# Patient Record
Sex: FEMALE | Race: WHITE | NOT HISPANIC OR LATINO | Employment: OTHER | URBAN - METROPOLITAN AREA
[De-identification: names, ages, dates, MRNs, and addresses within clinical notes are randomized per-mention and may not be internally consistent; named-entity substitution may affect disease eponyms.]

---

## 2017-02-01 RX ORDER — LEVOTHYROXINE SODIUM 0.05 MG/1
TABLET ORAL
Qty: 30 TAB | Refills: 0 | Status: SHIPPED | OUTPATIENT
Start: 2017-02-01 | End: 2017-06-16 | Stop reason: SDUPTHER

## 2017-05-02 ENCOUNTER — PATIENT OUTREACH (OUTPATIENT)
Dept: HEALTH INFORMATION MANAGEMENT | Facility: OTHER | Age: 62
End: 2017-05-02

## 2017-05-08 ENCOUNTER — HOSPITAL ENCOUNTER (OUTPATIENT)
Dept: LAB | Facility: MEDICAL CENTER | Age: 62
End: 2017-05-08
Attending: INTERNAL MEDICINE
Payer: MEDICARE

## 2017-05-08 ENCOUNTER — HOSPITAL ENCOUNTER (OUTPATIENT)
Dept: LAB | Facility: MEDICAL CENTER | Age: 62
End: 2017-05-08
Attending: NURSE PRACTITIONER
Payer: MEDICARE

## 2017-05-08 DIAGNOSIS — J43.9 MIXED RESTRICTIVE AND OBSTRUCTIVE LUNG DISEASE (HCC): ICD-10-CM

## 2017-05-08 DIAGNOSIS — E03.4 HYPOTHYROIDISM DUE TO ACQUIRED ATROPHY OF THYROID: ICD-10-CM

## 2017-05-08 DIAGNOSIS — Z13.220 SCREENING FOR LIPID DISORDERS: ICD-10-CM

## 2017-05-08 DIAGNOSIS — J98.4 MIXED RESTRICTIVE AND OBSTRUCTIVE LUNG DISEASE (HCC): ICD-10-CM

## 2017-05-08 LAB
ALBUMIN SERPL BCP-MCNC: 4.2 G/DL (ref 3.2–4.9)
ALBUMIN SERPL BCP-MCNC: 4.4 G/DL (ref 3.2–4.9)
ALBUMIN/GLOB SERPL: 1.4 G/DL
ALBUMIN/GLOB SERPL: 1.5 G/DL
ALP SERPL-CCNC: 58 U/L (ref 30–99)
ALP SERPL-CCNC: 59 U/L (ref 30–99)
ALT SERPL-CCNC: 13 U/L (ref 2–50)
ALT SERPL-CCNC: 13 U/L (ref 2–50)
ANION GAP SERPL CALC-SCNC: 6 MMOL/L (ref 0–11.9)
ANION GAP SERPL CALC-SCNC: 8 MMOL/L (ref 0–11.9)
AST SERPL-CCNC: 16 U/L (ref 12–45)
AST SERPL-CCNC: 18 U/L (ref 12–45)
BASOPHILS # BLD AUTO: 0.7 % (ref 0–1.8)
BASOPHILS # BLD AUTO: 0.9 % (ref 0–1.8)
BASOPHILS # BLD: 0.05 K/UL (ref 0–0.12)
BASOPHILS # BLD: 0.06 K/UL (ref 0–0.12)
BILIRUB SERPL-MCNC: 0.8 MG/DL (ref 0.1–1.5)
BILIRUB SERPL-MCNC: 0.8 MG/DL (ref 0.1–1.5)
BUN SERPL-MCNC: 11 MG/DL (ref 8–22)
BUN SERPL-MCNC: 11 MG/DL (ref 8–22)
CALCIUM SERPL-MCNC: 10 MG/DL (ref 8.5–10.5)
CALCIUM SERPL-MCNC: 9.6 MG/DL (ref 8.5–10.5)
CHLORIDE SERPL-SCNC: 102 MMOL/L (ref 96–112)
CHLORIDE SERPL-SCNC: 103 MMOL/L (ref 96–112)
CHOLEST SERPL-MCNC: 176 MG/DL (ref 100–199)
CHOLEST SERPL-MCNC: 183 MG/DL (ref 100–199)
CO2 SERPL-SCNC: 26 MMOL/L (ref 20–33)
CO2 SERPL-SCNC: 28 MMOL/L (ref 20–33)
COMMENT 1642: NORMAL
CREAT SERPL-MCNC: 0.88 MG/DL (ref 0.5–1.4)
CREAT SERPL-MCNC: 0.91 MG/DL (ref 0.5–1.4)
EOSINOPHIL # BLD AUTO: 0.11 K/UL (ref 0–0.51)
EOSINOPHIL # BLD AUTO: 0.14 K/UL (ref 0–0.51)
EOSINOPHIL NFR BLD: 1.6 % (ref 0–6.9)
EOSINOPHIL NFR BLD: 2 % (ref 0–6.9)
ERYTHROCYTE [DISTWIDTH] IN BLOOD BY AUTOMATED COUNT: 45.7 FL (ref 35.9–50)
ERYTHROCYTE [DISTWIDTH] IN BLOOD BY AUTOMATED COUNT: 45.8 FL (ref 35.9–50)
GFR SERPL CREATININE-BSD FRML MDRD: >60 ML/MIN/1.73 M 2
GFR SERPL CREATININE-BSD FRML MDRD: >60 ML/MIN/1.73 M 2
GLOBULIN SER CALC-MCNC: 3 G/DL (ref 1.9–3.5)
GLOBULIN SER CALC-MCNC: 3 G/DL (ref 1.9–3.5)
GLUCOSE SERPL-MCNC: 104 MG/DL (ref 65–99)
GLUCOSE SERPL-MCNC: 112 MG/DL (ref 65–99)
HCT VFR BLD AUTO: 47.5 % (ref 37–47)
HCT VFR BLD AUTO: 47.8 % (ref 37–47)
HDLC SERPL-MCNC: 50 MG/DL
HDLC SERPL-MCNC: 50 MG/DL
HGB BLD-MCNC: 16.1 G/DL (ref 12–16)
HGB BLD-MCNC: 16.1 G/DL (ref 12–16)
IMM GRANULOCYTES # BLD AUTO: 0.01 K/UL (ref 0–0.11)
IMM GRANULOCYTES # BLD AUTO: 0.01 K/UL (ref 0–0.11)
IMM GRANULOCYTES NFR BLD AUTO: 0.1 % (ref 0–0.9)
IMM GRANULOCYTES NFR BLD AUTO: 0.1 % (ref 0–0.9)
LDLC SERPL CALC-MCNC: 104 MG/DL
LDLC SERPL CALC-MCNC: 98 MG/DL
LYMPHOCYTES # BLD AUTO: 2.15 K/UL (ref 1–4.8)
LYMPHOCYTES # BLD AUTO: 2.2 K/UL (ref 1–4.8)
LYMPHOCYTES NFR BLD: 32.1 % (ref 22–41)
LYMPHOCYTES NFR BLD: 32.2 % (ref 22–41)
MCH RBC QN AUTO: 30.3 PG (ref 27–33)
MCH RBC QN AUTO: 30.6 PG (ref 27–33)
MCHC RBC AUTO-ENTMCNC: 33.7 G/DL (ref 33.6–35)
MCHC RBC AUTO-ENTMCNC: 33.9 G/DL (ref 33.6–35)
MCV RBC AUTO: 89.8 FL (ref 81.4–97.8)
MCV RBC AUTO: 90.3 FL (ref 81.4–97.8)
MONOCYTES # BLD AUTO: 0.39 K/UL (ref 0–0.85)
MONOCYTES # BLD AUTO: 0.43 K/UL (ref 0–0.85)
MONOCYTES NFR BLD AUTO: 5.8 % (ref 0–13.4)
MONOCYTES NFR BLD AUTO: 6.3 % (ref 0–13.4)
MORPHOLOGY BLD-IMP: NORMAL
NEUTROPHILS # BLD AUTO: 3.99 K/UL (ref 2–7.15)
NEUTROPHILS # BLD AUTO: 4 K/UL (ref 2–7.15)
NEUTROPHILS NFR BLD: 58.5 % (ref 44–72)
NEUTROPHILS NFR BLD: 59.7 % (ref 44–72)
NRBC # BLD AUTO: 0 K/UL
NRBC # BLD AUTO: 0 K/UL
NRBC BLD AUTO-RTO: 0 /100 WBC
NRBC BLD AUTO-RTO: 0 /100 WBC
PLATELET # BLD AUTO: 239 K/UL (ref 164–446)
PLATELET # BLD AUTO: 253 K/UL (ref 164–446)
PMV BLD AUTO: 9.2 FL (ref 9–12.9)
PMV BLD AUTO: 9.4 FL (ref 9–12.9)
POTASSIUM SERPL-SCNC: 4.5 MMOL/L (ref 3.6–5.5)
POTASSIUM SERPL-SCNC: 4.6 MMOL/L (ref 3.6–5.5)
PROT SERPL-MCNC: 7.2 G/DL (ref 6–8.2)
PROT SERPL-MCNC: 7.4 G/DL (ref 6–8.2)
RBC # BLD AUTO: 5.26 M/UL (ref 4.2–5.4)
RBC # BLD AUTO: 5.32 M/UL (ref 4.2–5.4)
SODIUM SERPL-SCNC: 136 MMOL/L (ref 135–145)
SODIUM SERPL-SCNC: 137 MMOL/L (ref 135–145)
T4 FREE SERPL-MCNC: 0.69 NG/DL (ref 0.53–1.43)
TRIGL SERPL-MCNC: 140 MG/DL (ref 0–149)
TRIGL SERPL-MCNC: 143 MG/DL (ref 0–149)
TSH SERPL DL<=0.005 MIU/L-ACNC: 9.66 UIU/ML (ref 0.3–3.7)
TSH SERPL DL<=0.005 MIU/L-ACNC: 9.76 UIU/ML (ref 0.3–3.7)
WBC # BLD AUTO: 6.7 K/UL (ref 4.8–10.8)
WBC # BLD AUTO: 6.8 K/UL (ref 4.8–10.8)

## 2017-05-08 PROCEDURE — 80053 COMPREHEN METABOLIC PANEL: CPT | Mod: 91

## 2017-05-08 PROCEDURE — 85025 COMPLETE CBC W/AUTO DIFF WBC: CPT | Mod: 91

## 2017-05-08 PROCEDURE — 84439 ASSAY OF FREE THYROXINE: CPT

## 2017-05-08 PROCEDURE — 80061 LIPID PANEL: CPT | Mod: GA,91

## 2017-05-08 PROCEDURE — 84443 ASSAY THYROID STIM HORMONE: CPT | Mod: 91

## 2017-05-16 NOTE — PROGRESS NOTES
Outcome: Left Message    WebIZ Checked & Epic Updated:  yes    HealthConnect Verified: no    Attempt # 2

## 2017-05-19 ENCOUNTER — APPOINTMENT (OUTPATIENT)
Dept: MEDICAL GROUP | Age: 62
End: 2017-05-19
Payer: MEDICARE

## 2017-05-23 ENCOUNTER — TELEPHONE (OUTPATIENT)
Dept: HEALTH INFORMATION MANAGEMENT | Facility: OTHER | Age: 62
End: 2017-05-23

## 2017-05-23 DIAGNOSIS — Z12.11 SCREENING FOR COLON CANCER: ICD-10-CM

## 2017-05-23 NOTE — TELEPHONE ENCOUNTER
Please advise pt she is due for FIT. Please mail pt packet or she may .   Please close encounter once complete.

## 2017-05-23 NOTE — PROGRESS NOTES
Outcome: Left Message    WebIZ Checked & Epic Updated:  yes    HealthConnect Verified: no    Attempt # 3

## 2017-05-23 NOTE — PROGRESS NOTES
Attempt #:INCOMING    WebIZ Checked & Epic Updated: yes  HealthConnect Verified: no  Verify PCP: yes    Communication Preference Obtained: yes     Review Care Team: yes    Annual Wellness Visit Scheduling  1. Scheduling Status:Scheduled     Care Gap Scheduling (Attempt to Schedule EACH Overdue Care Gap!)     Health Maintenance Due   Topic Date Due   • IMM DTaP/Tdap/Td Vaccine (1 - Tdap) PT WILL DISCUSS WITH PCP   • PAP SMEAR  PT DOES NOT WANT TO SCHEDULE YET   • IMM ZOSTER VACCINE  PT WILL F/U WITH PCP   • PFT SCREENING-FEV1 AND FEV/FVC RATIO / SPIROMETRY SHOULD BE PERFORMED ANNUALLY  NA   • COLON CANCER SCREENING ANNUAL FIT  ORDERS SENT   • MAMMOGRAM  WILL DISCUSS WITH PCP   • Annual Wellness Visit  SCHEDULED         [employer]: already active  [employer] Maryse: no  Virtual Visits: no  Opt In to Text Messages: no

## 2017-05-30 ENCOUNTER — OFFICE VISIT (OUTPATIENT)
Dept: MEDICAL GROUP | Facility: MEDICAL CENTER | Age: 62
End: 2017-05-30
Payer: MEDICARE

## 2017-05-30 VITALS
HEART RATE: 97 BPM | OXYGEN SATURATION: 93 % | BODY MASS INDEX: 32.33 KG/M2 | WEIGHT: 206 LBS | HEIGHT: 67 IN | SYSTOLIC BLOOD PRESSURE: 102 MMHG | TEMPERATURE: 96.4 F | DIASTOLIC BLOOD PRESSURE: 78 MMHG

## 2017-05-30 DIAGNOSIS — J43.9 MIXED RESTRICTIVE AND OBSTRUCTIVE LUNG DISEASE (HCC): ICD-10-CM

## 2017-05-30 DIAGNOSIS — F43.10 PTSD (POST-TRAUMATIC STRESS DISORDER): ICD-10-CM

## 2017-05-30 DIAGNOSIS — R52 GENERALIZED PAIN: ICD-10-CM

## 2017-05-30 DIAGNOSIS — Z98.890 POSTOPERATIVE HYPOXIA: ICD-10-CM

## 2017-05-30 DIAGNOSIS — J44.9 CHRONIC OBSTRUCTIVE PULMONARY DISEASE, UNSPECIFIED COPD TYPE (HCC): ICD-10-CM

## 2017-05-30 DIAGNOSIS — F41.9 ANXIETY: ICD-10-CM

## 2017-05-30 DIAGNOSIS — J96.11 CHRONIC HYPOXEMIC RESPIRATORY FAILURE (HCC): ICD-10-CM

## 2017-05-30 DIAGNOSIS — F31.9 BIPOLAR 1 DISORDER (HCC): ICD-10-CM

## 2017-05-30 DIAGNOSIS — R09.02 POSTOPERATIVE HYPOXIA: ICD-10-CM

## 2017-05-30 DIAGNOSIS — K81.0 CHOLECYSTITIS, ACUTE: ICD-10-CM

## 2017-05-30 DIAGNOSIS — Z87.891 SMOKING HISTORY: ICD-10-CM

## 2017-05-30 DIAGNOSIS — E87.1 HYPONATREMIA: ICD-10-CM

## 2017-05-30 DIAGNOSIS — E03.4 HYPOTHYROIDISM DUE TO ACQUIRED ATROPHY OF THYROID: ICD-10-CM

## 2017-05-30 DIAGNOSIS — R91.1 PULMONARY NODULE: ICD-10-CM

## 2017-05-30 DIAGNOSIS — M54.9 BACK PAIN WITH RADIATION: ICD-10-CM

## 2017-05-30 DIAGNOSIS — J98.4 MIXED RESTRICTIVE AND OBSTRUCTIVE LUNG DISEASE (HCC): ICD-10-CM

## 2017-05-30 DIAGNOSIS — R93.89 ABNORMAL CHEST CT: ICD-10-CM

## 2017-05-30 DIAGNOSIS — M27.0 TORUS PALATINUS: ICD-10-CM

## 2017-05-30 DIAGNOSIS — Z00.00 MEDICARE ANNUAL WELLNESS VISIT, SUBSEQUENT: Primary | ICD-10-CM

## 2017-05-30 DIAGNOSIS — M50.30 DDD (DEGENERATIVE DISC DISEASE), CERVICAL: ICD-10-CM

## 2017-05-30 PROCEDURE — G0439 PPPS, SUBSEQ VISIT: HCPCS | Performed by: NURSE PRACTITIONER

## 2017-05-30 PROCEDURE — G8432 DEP SCR NOT DOC, RNG: HCPCS | Performed by: NURSE PRACTITIONER

## 2017-05-30 PROCEDURE — 1036F TOBACCO NON-USER: CPT | Performed by: NURSE PRACTITIONER

## 2017-05-30 RX ORDER — IBUPROFEN 600 MG/1
600 TABLET ORAL EVERY 6 HOURS PRN
COMMUNITY
End: 2022-03-03

## 2017-05-30 RX ORDER — FLUTICASONE PROPIONATE 50 MCG
1 SPRAY, SUSPENSION (ML) NASAL DAILY
COMMUNITY
End: 2018-10-29

## 2017-05-30 ASSESSMENT — PATIENT HEALTH QUESTIONNAIRE - PHQ9: CLINICAL INTERPRETATION OF PHQ2 SCORE: 0

## 2017-05-30 NOTE — ASSESSMENT & PLAN NOTE
Chronic condition managed with current medical regimen  Stable per review   Continue with current meds  Followed by psychiatry q 6 months

## 2017-05-30 NOTE — ASSESSMENT & PLAN NOTE
Patient aware of relationship between weight and health and working on diet  Followed by Elham Maria M.D..

## 2017-05-30 NOTE — ASSESSMENT & PLAN NOTE
Plan is to have ridge removed, needs to have dentures  Cost is prohibitive at this time, recom [facility]  Followed by Elham Maria M.D..

## 2017-05-30 NOTE — ASSESSMENT & PLAN NOTE
Chronic condition managed with current medical regimen  Stable per review    Continue with current meds prn  Followed by pulm med prn, and Elham Maria M.D.

## 2017-05-30 NOTE — ASSESSMENT & PLAN NOTE
Chronic condition managed with current medical regimen  Stable per review, no change cont with pain   Continue with current meds prn  Followed by Elham Maria M.D..

## 2017-05-30 NOTE — ASSESSMENT & PLAN NOTE
9/11/2016  Done to compare with CT of 3/2016   Impression        1.  1.6 cm nodule infiltration right lower lobe, 4.8 mm nodular infiltrate right lower lobe, and 2.7 mm nodule right upper lobe unchanged since 6/17/2014. 2 years stability suggests benignity.  2.  Hyperexpanded and emphysematous lungs. No pleural effusions.     Followed by Elham Maira M.D and kandy

## 2017-05-30 NOTE — ASSESSMENT & PLAN NOTE
Chronic condition managed with current medical regimen  Stable per review   Continue with current meds prn  Followed by Elham Maria M.D..

## 2017-05-30 NOTE — ASSESSMENT & PLAN NOTE
Chronic condition managed with current medical regimen  Stable per review, does not require O2   Continue with current meds  Followed by pulm heike dumont and Elham Maria M.D.

## 2017-05-30 NOTE — ASSESSMENT & PLAN NOTE
Chronic condition managed with current medical regimen  Stable per review, no longer needs O2   Continue with current meds  Followed by pulm heike dumont and Elham Maria M.D.

## 2017-05-30 NOTE — ASSESSMENT & PLAN NOTE
Chronic condition managed with current medical regimen  Stable per review   Continue with current meds  Followed by Elham Maria M.D..
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
## 2017-05-30 NOTE — PATIENT INSTRUCTIONS
Continue with care through Elham Maria M.D..  Next Medicare Annual Wellness Visit is due in one year.    Continue care with specialists you are seeing for your chronic problems.    Attached is some preventative information for you to read.          Fall Prevention and Home Safety  Falls cause injuries and can affect all age groups. It is possible to prevent falls.   HOW TO PREVENT FALLS  · Wear shoes with rubber soles that do not have an opening for your toes.   · Keep the inside and outside of your house well lit.   · Use night lights throughout your home.   · Remove clutter from floors.   · Clean up floor spills.   · Remove throw rugs or fasten them to the floor with carpet tape.   · Do not place electrical cords across pathways.   · Put grab bars by your tub, shower, and toilet. Do not use towel bars as grab bars.   · Put handrails on both sides of the stairway. Fix loose handrails.   · Do not climb on stools or stepladders, if possible.   · Do not wax your floors.   · Repair uneven or unsafe sidewalks, walkways, or stairs.   · Keep items you use a lot within reach.   · Be aware of pets.   · Keep emergency numbers next to the telephone.   · Put smoke detectors in your home and near bedrooms.   Ask your doctor what other things you can do to prevent falls.  Document Released: 10/14/2010 Document Revised: 06/18/2013 Document Reviewed: 03/19/2013  ExitCare® Patient Information ©2013 "[employer]".    Exercise to Stay Healthy      Exercise helps you become and stay healthy.  EXERCISE IDEAS AND TIPS  Choose exercises that:  · You enjoy.   · Fit into your day.   You do not need to exercise really hard to be healthy. You can do exercises at a slow or medium level and stay healthy. You can:  · Stretch before and after working out.   · Try yoga, Pilates, or onesimo chi.   · Lift weights.   · Walk fast, swim, jog, run, climb stairs, bicycle, dance, or rollerskate.   · Take aerobic classes.   Exercises that burn about 150  calories:  · Running 1 ½ miles in 15 minutes.   · Playing volleyball for 45 to 60 minutes.   · Washing and waxing a car for 45 to 60 minutes.   · Playing touch football for 45 minutes.   · Walking 1 ¾ miles in 35 minutes.   · Pushing a stroller 1 ½ miles in 30 minutes.   · Playing basketball for 30 minutes.   · Raking leaves for 30 minutes.   · Bicycling 5 miles in 30 minutes.   · Walking 2 miles in 30 minutes.   · Dancing for 30 minutes.   · Shoveling snow for 15 minutes.   · Swimming laps for 20 minutes.   · Walking up stairs for 15 minutes.   · Bicycling 4 miles in 15 minutes.   · Gardening for 30 to 45 minutes.   · Jumping rope for 15 minutes.   · Washing windows or floors for 45 to 60 minutes.     One suggestion is to start walking for 10 minutes after each meal.  This will help with digestion and help you to metabolize your meal.       For Weight Loss -   Recommend portion sizes with more fruits/vegetables/high fiber foods.  Stay away from white bread/pastas/white rice/white potatoes.  Increase Fluid intake to 6-8 glasses of water daily.   Eliminate soda's (diet and regular) from your fluid intake.      Document Released: 01/20/2012 Document Revised: 03/11/2013 Document Reviewed: 01/20/2012  ExitCare® Patient Information ©2013 [employer].    Fat and Cholesterol Control Diet  Cholesterol is a wax-like substance. It comes from your liver and is found in certain foods. There is good (HDL) and bad (LDL) cholesterol. Too much cholesterol in your blood can affect your heart. Certain foods can lower or raise your cholesterol. Eat foods that are low in cholesterol.  Saturated and trans fats are bad fats found in foods that will raise your cholesterol. Do not eat foods that are high in saturated and trans fats.  FOODS HIGHER IN SATURATED AND TRANS FATS  · Dairy products, such as whole milk, eggs, cheese, cream, and butter.   · Fatty meats, such as hot dogs, sausage, and salami.   · Fried foods.   · Trans fats which  are found in margarine and pre-made cookies, crackers, and baked goods.   · Tropical oils, such as coconut and palm oils.   Read package labels at the store. Do not buy products that use saturated or trans fats or hydrogenated oils. Find foods labeled:  · Low-fat.   · Low-saturated fat.   · Trans-fat-free.   · Low-cholesterol.   FOODS LOWER IN CHOLESTEROL  ·  Fruit.   · Vegetables.   · Beans, peas, and lentils.   · Fish.   · Lean meat, such as chicken (without skin) or ground turkey.   · Grains, such as barley, rice, couscous, bulgur wheat, and pasta.   · Heart-healthy tub margarine.   PREPARING YOUR FOOD  · Broil, bake, steam, or roast foods. Do not holman food.   · Use non-stick cooking sprays.   · Use lemon or herbs to flavor food instead of using butter or stick margarine.   · Use nonfat yogurt, salsa, or low-fat dressings for salads.   Document Released: 06/18/2013 Document Reviewed: 06/18/2013  ExitCare® Patient Information ©2013 [employer].    Recommend annual flu vaccine.  Next due in Fall, 2015.  If you decide not to have the flu vaccine, recommend good handwashing and staying out of crowds during flu season.

## 2017-05-30 NOTE — MR AVS SNAPSHOT
"        Alex Shepard   2017 10:00 AM   Office Visit   MRN: [id number]    Department:  [facility]   Dept Phone:  [phone]    Description:  Female : 1955   Provider:  DENNIS Mandujano           Reason for Visit     Annual Exam subsequent      Allergies as of 2017     Allergen Noted Reactions    Penicillins 2013       As child- has a lot of pcn- uncertain about reaction.     Nicoderm [Nicotine] 2015   Rash    Rash where patch was placed.       You were diagnosed with     Medicare annual wellness visit, subsequent   [984856]  -  Primary     Abnormal chest CT   [684444]       Anxiety   [985023]       Back pain with radiation   [757200]       Bipolar 1 disorder (CMS-[facility])   [163155]       BMI 33.0-33.9,adult   [830965]       Chronic hypoxemic respiratory failure (CMS-[facility])   [316945]       DDD (degenerative disc disease), cervical   [725590]       Generalized pain   [780.96.ICD-9-CM]       Hyponatremia   [730379]       Hypothyroidism due to acquired atrophy of thyroid   [1055866]       Mixed restrictive and obstructive lung disease (CMS-[facility])   [144255]       Postoperative hypoxia   [0299847]       PTSD (post-traumatic stress disorder)   [213854]       Pulmonary nodule   [390508]       Cholecystitis, acute   [167075]       Chronic obstructive pulmonary disease, unspecified COPD type (CMS-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   [facility] [address] [place] [address]  "    [address] [facility] [id number] [phone]           You will be receiving a confirmation call a few days before your appointment from our automated call confirmation system.              Problem List              ICD-10-CM Priority Class Noted - Resolved    Hyponatremia E87.1   3/4/2013 - Present    Depression F32.9   3/4/2013 - Present    Anxiety F41.9   3/4/2013 - Present    Smoking history Z87.891   11/19/2014 - Present    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, J98.4   12/17/2014 - Present    BMI 33.0-33.9,adult Z68.33   3/28/2015 - Present    PTSD (post-traumatic stress disorder) F43.10   8/22/2015 - Present    Generalized pain R52   8/22/2015 - Present    COPD (chronic obstructive pulmonary disease) (CMS-HCC) J44.9   8/22/2015 - Present    Bipolar 1 disorder (CMS-HCC) F31.9   8/22/2015 - Present    Back pain with radiation M54.9   8/26/2015 - Present    DDD (degenerative disc disease), cervical M50.30   8/31/2015 - Present    Chronic hypoxemic respiratory failure (CMS-HCC) J96.11   8/31/2015 - Present    Pulmonary nodule R91.1   9/23/2015 - Present    Asthma J45.909   9/23/2015 - Present    Abnormal chest CT R93.8   10/22/2015 - Present    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   5/17/2016 - Present    Hypothyroidism due to acquired atrophy of thyroid E03.4   11/15/2016 - Present      Health Maintenance        Date Due Completion Dates    IMM DTaP/Tdap/Td Vaccine (1 - Tdap) 5/21/1972 ---    PAP SMEAR 5/21/1974 ---    IMM ZOSTER VACCINE 5/21/2013 ---    COLON CANCER SCREENING ANNUAL FIT 9/17/2016 9/17/2015    MAMMOGRAM 6/1/2018 (Originally 10/26/2016) 10/26/2015 (Postponed), 9/16/2013, 4/22/2008, 4/22/2008    Override on 10/26/2015: Postponed (pt elects to postpone 1 yr)            Current Immunizations     Influenza TIV (IM) 9/29/2014    Influenza Vaccine Quad Inj (Pf) 11/15/2016, 10/22/2015    Pneumococcal polysaccharide vaccine (PPSV-23) 9/29/2014      Below and/or attached are the  medications your provider expects you to take. Review all of your home medications and newly ordered medications with your provider and/or pharmacist. Follow medication instructions as directed by your provider and/or pharmacist. Please keep your medication list with you and share with your provider. Update the information when medications are discontinued, doses are changed, or new medications (including over-the-counter products) are added; and carry medication information at all times in the event of emergency situations     Allergies:  PENICILLINS - (reactions not documented)     NICODERM - Rash               Medications  Valid as of: May 30, 2017 - 10:32 AM    Generic Name Brand Name Tablet Size Instructions for use    Albuterol Sulfate (Aero Soln) albuterol 108 (90 BASE) MCG/ACT Inhale 2 Puffs by mouth every 6 hours as needed for Shortness of Breath.        ARIPiprazole (Tab) ABILIFY 10 MG Take 10 mg by mouth every day.        Baclofen (Tab) LIORESAL 10 MG Take 1 Tab by mouth 3 times a day.        DULoxetine HCl (Cap DR Particles) CYMBALTA 30 MG 30 mg every day. calms her down with pain        Fluticasone Propionate (Suspension) FLONASE 50 MCG/ACT Spray 1 Spray in nose every day.        HydrOXYzine HCl (Tab) ATARAX 50 MG Take 50 mg by mouth 3 times a day as needed.        Ibuprofen (Tab) MOTRIN 600 MG Take 600 mg by mouth every 6 hours as needed.        Levothyroxine Sodium (Tab) SYNTHROID 50 MCG TAKE ONE TABLET BY MOUTH ONCE DAILY IN THE MORNING ON  AN  EMPTY  STOMACH        Nystatin (Cream) MYCOSTATIN 354047 UNIT/GM APPLY 1 GRAM TOPICALLY TO AFFECTED AREA(S) TWICE DAILY        Omeprazole (CAPSULE DELAYED RELEASE) PRILOSEC 20 MG TAKE ONE CAPSULE BY MOUTH ONCE DAILY        Tiotropium Bromide Monohydrate (Cap) SPIRIVA 18 MCG Inhale 1 Cap by mouth every day.        TraMADol HCl (Tab) ULTRAM 50 MG Take  mg by mouth every four hours as needed.        .                 Medicines prescribed today were sent to:      [facility] [address] [place] [address], [place] - [address]    [address] [place] [address]    Phone: [phone] Fax: [phone]    Open 24 Hours?: No      Medication refill instructions:       If your prescription bottle indicates you have medication refills left, it is not necessary to call your provider’s office. Please contact your pharmacy and they will refill your medication.    If your prescription bottle indicates you do not have any refills left, you may request refills at any time through one of the following ways: The online [email or web address] system (except Urgent Care), by calling your provider’s office, or by asking your pharmacy to contact your provider’s office with a refill request. Medication refills are processed only during regular business hours and may not be available until the next business day. Your provider may request additional information or to have a follow-up visit with you prior to refilling your medication.   *Please Note: Medication refills are assigned a new Rx number when refilled electronically. Your pharmacy may indicate that no refills were authorized even though a new prescription for the same medication is available at the pharmacy. Please request the medicine by name with the pharmacy before contacting your provider for a refill.        Instructions    Continue with care through Elham Maria M.D..  Next Medicare Annual Wellness Visit is due in one year.    Continue care with specialists you are seeing for your chronic problems.    Attached is some preventative information for you to read.          Fall Prevention and Home Safety  Falls cause injuries and can affect all age groups. It is possible to prevent falls.   HOW TO PREVENT FALLS  · Wear shoes with rubber soles that do not have an opening for your toes.   · Keep the inside and outside of your house well lit.   · Use night lights throughout your home.   · Remove clutter from floors.   · Clean up floor spills.    · Remove throw rugs or fasten them to the floor with carpet tape.   · Do not place electrical cords across pathways.   · Put grab bars by your tub, shower, and toilet. Do not use towel bars as grab bars.   · Put handrails on both sides of the stairway. Fix loose handrails.   · Do not climb on stools or stepladders, if possible.   · Do not wax your floors.   · Repair uneven or unsafe sidewalks, walkways, or stairs.   · Keep items you use a lot within reach.   · Be aware of pets.   · Keep emergency numbers next to the telephone.   · Put smoke detectors in your home and near bedrooms.   Ask your doctor what other things you can do to prevent falls.  Document Released: 10/14/2010 Document Revised: 06/18/2013 Document Reviewed: 03/19/2013  ExitCare® Patient Information ©2013 [employer].    Exercise to Stay Healthy      Exercise helps you become and stay healthy.  EXERCISE IDEAS AND TIPS  Choose exercises that:  · You enjoy.   · Fit into your day.   You do not need to exercise really hard to be healthy. You can do exercises at a slow or medium level and stay healthy. You can:  · Stretch before and after working out.   · Try yoga, Pilates, or onesimo chi.   · Lift weights.   · Walk fast, swim, jog, run, climb stairs, bicycle, dance, or rollerskate.   · Take aerobic classes.   Exercises that burn about 150 calories:  · Running 1 ½ miles in 15 minutes.   · Playing volleyball for 45 to 60 minutes.   · Washing and waxing a car for 45 to 60 minutes.   · Playing touch football for 45 minutes.   · Walking 1 ¾ miles in 35 minutes.   · Pushing a stroller 1 ½ miles in 30 minutes.   · Playing basketball for 30 minutes.   · Raking leaves for 30 minutes.   · Bicycling 5 miles in 30 minutes.   · Walking 2 miles in 30 minutes.   · Dancing for 30 minutes.   · Shoveling snow for 15 minutes.   · Swimming laps for 20 minutes.   · Walking up stairs for 15 minutes.   · Bicycling 4 miles in 15 minutes.   · Gardening for 30 to 45 minutes.    · Jumping rope for 15 minutes.   · Washing windows or floors for 45 to 60 minutes.     One suggestion is to start walking for 10 minutes after each meal.  This will help with digestion and help you to metabolize your meal.       For Weight Loss -   Recommend portion sizes with more fruits/vegetables/high fiber foods.  Stay away from white bread/pastas/white rice/white potatoes.  Increase Fluid intake to 6-8 glasses of water daily.   Eliminate soda's (diet and regular) from your fluid intake.      Document Released: 01/20/2012 Document Revised: 03/11/2013 Document Reviewed: 01/20/2012  ExitCare® Patient Information ©2013 [employer].    Fat and Cholesterol Control Diet  Cholesterol is a wax-like substance. It comes from your liver and is found in certain foods. There is good (HDL) and bad (LDL) cholesterol. Too much cholesterol in your blood can affect your heart. Certain foods can lower or raise your cholesterol. Eat foods that are low in cholesterol.  Saturated and trans fats are bad fats found in foods that will raise your cholesterol. Do not eat foods that are high in saturated and trans fats.  FOODS HIGHER IN SATURATED AND TRANS FATS  · Dairy products, such as whole milk, eggs, cheese, cream, and butter.   · Fatty meats, such as hot dogs, sausage, and salami.   · Fried foods.   · Trans fats which are found in margarine and pre-made cookies, crackers, and baked goods.   · Tropical oils, such as coconut and palm oils.   Read package labels at the store. Do not buy products that use saturated or trans fats or hydrogenated oils. Find foods labeled:  · Low-fat.   · Low-saturated fat.   · Trans-fat-free.   · Low-cholesterol.   FOODS LOWER IN CHOLESTEROL  ·  Fruit.   · Vegetables.   · Beans, peas, and lentils.   · Fish.   · Lean meat, such as chicken (without skin) or ground turkey.   · Grains, such as barley, rice, couscous, bulgur wheat, and pasta.   · Heart-healthy tub margarine.   PREPARING YOUR FOOD  · Keerthi  bake, steam, or roast foods. Do not holman food.   · Use non-stick cooking sprays.   · Use lemon or herbs to flavor food instead of using butter or stick margarine.   · Use nonfat yogurt, salsa, or low-fat dressings for salads.   Document Released: 06/18/2013 Document Reviewed: 06/18/2013  ExitCare® Patient Information ©2013 [employer].    Recommend annual flu vaccine.  Next due in Fall, 2015.  If you decide not to have the flu vaccine, recommend good handwashing and staying out of crowds during flu season.                  [employer] Access Code: Activation code not generated  Current Light Magic Status: Active
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
## 2017-05-30 NOTE — ASSESSMENT & PLAN NOTE
9/11/2016  Done to compare with CT of 3/2016   Impression        1.  1.6 cm nodule infiltration right lower lobe, 4.8 mm nodular infiltrate right lower lobe, and 2.7 mm nodule right upper lobe unchanged since 6/17/2014. 2 years stability suggests benignity.  2.  Hyperexpanded and emphysematous lungs. No pleural effusions.     Followed by Elham Maria M.D and kandy

## 2017-06-01 RX ORDER — NYSTATIN 100000 U/G
CREAM TOPICAL
Qty: 15 G | Refills: 0 | Status: SHIPPED | OUTPATIENT
Start: 2017-06-01 | End: 2017-08-10 | Stop reason: SDUPTHER

## 2017-06-15 ENCOUNTER — TELEPHONE (OUTPATIENT)
Dept: MEDICAL GROUP | Age: 62
End: 2017-06-15

## 2017-06-15 NOTE — TELEPHONE ENCOUNTER
ESTABLISHED PATIENT PRE-VISIT PLANNING     Note: Patient will not be contacted if there is no indication to call.     1.  Reviewed notes from the last few office visits within the medical group: Yes    2.  If any orders were placed at last visit or intended to be done for this visit (i.e. 6 mos follow-up), do we have Results/Consult Notes?        •  Labs - Labs ordered, completed and results are in chart.       •  Imaging - Imaging was not ordered at last office visit.       •  Referrals - No referrals were ordered at last office visit.    3. Is this appointment scheduled as a Hospital Follow-Up? No    4.  Immunizations were updated in Epic using WebIZ?: Epic matches WebIZ       •  Web Iz Recommendations: HEPATITIS A  TDAP ZOSTAVAX (Shingles)    5.  Patient is due for the following Health Maintenance Topics:   Health Maintenance Due   Topic Date Due   • IMM DTaP/Tdap/Td Vaccine (1 - Tdap) 05/21/1972   • PAP SMEAR  05/21/1974   • IMM ZOSTER VACCINE  05/21/2013   • PFT SCREENING-FEV1 AND FEV/FVC RATIO / SPIROMETRY SHOULD BE PERFORMED ANNUALLY  12/12/2015   • COLON CANCER SCREENING ANNUAL FIT  09/17/2016           6.  Patient was NOT informed to arrive 15 min prior to their scheduled appointment and bring in their medication bottles.

## 2017-06-16 ENCOUNTER — OFFICE VISIT (OUTPATIENT)
Dept: MEDICAL GROUP | Age: 62
End: 2017-06-16
Payer: MEDICARE

## 2017-06-16 VITALS
WEIGHT: 207 LBS | SYSTOLIC BLOOD PRESSURE: 118 MMHG | HEIGHT: 67 IN | HEART RATE: 99 BPM | TEMPERATURE: 97.3 F | OXYGEN SATURATION: 90 % | DIASTOLIC BLOOD PRESSURE: 86 MMHG | BODY MASS INDEX: 32.49 KG/M2

## 2017-06-16 DIAGNOSIS — J43.9 MIXED RESTRICTIVE AND OBSTRUCTIVE LUNG DISEASE (HCC): ICD-10-CM

## 2017-06-16 DIAGNOSIS — D58.2 ELEVATED HEMOGLOBIN (HCC): ICD-10-CM

## 2017-06-16 DIAGNOSIS — J44.9 CHRONIC OBSTRUCTIVE PULMONARY DISEASE, UNSPECIFIED COPD TYPE (HCC): ICD-10-CM

## 2017-06-16 DIAGNOSIS — R10.13 EPIGASTRIC PAIN: ICD-10-CM

## 2017-06-16 DIAGNOSIS — R91.1 PULMONARY NODULE: ICD-10-CM

## 2017-06-16 DIAGNOSIS — G89.4 CHRONIC PAIN SYNDROME: ICD-10-CM

## 2017-06-16 DIAGNOSIS — E03.4 HYPOTHYROIDISM DUE TO ACQUIRED ATROPHY OF THYROID: ICD-10-CM

## 2017-06-16 DIAGNOSIS — Z12.11 SCREEN FOR COLON CANCER: ICD-10-CM

## 2017-06-16 DIAGNOSIS — J98.4 MIXED RESTRICTIVE AND OBSTRUCTIVE LUNG DISEASE (HCC): ICD-10-CM

## 2017-06-16 DIAGNOSIS — R73.01 ELEVATED FASTING GLUCOSE: ICD-10-CM

## 2017-06-16 DIAGNOSIS — J45.20 MILD INTERMITTENT ASTHMA WITHOUT COMPLICATION: ICD-10-CM

## 2017-06-16 DIAGNOSIS — Z23 NEED FOR SHINGLES VACCINE: ICD-10-CM

## 2017-06-16 PROCEDURE — 99214 OFFICE O/P EST MOD 30 MIN: CPT | Performed by: FAMILY MEDICINE

## 2017-06-16 RX ORDER — LEVOTHYROXINE SODIUM 0.05 MG/1
TABLET ORAL
Qty: 30 TAB | Refills: 3 | Status: SHIPPED | OUTPATIENT
Start: 2017-06-16 | End: 2018-10-12

## 2017-06-16 NOTE — MR AVS SNAPSHOT
"        Alex Shepard   2017 10:00 AM   Office Visit   MRN: [id number]    Department:  [address] [place] [address]   Dept Phone:  [phone]    Description:  Female : 1955   Provider:  Elham Maria M.D.           Reason for Visit     COPD lab review      Allergies as of 2017     Allergen Noted Reactions    Penicillins 2013       As child- has a lot of pcn- uncertain about reaction.     Nicoderm [Nicotine] 2015   Rash    Rash where patch was placed.       You were diagnosed with     Chronic obstructive pulmonary disease, unspecified COPD type (CMS-HCC)   [9416215]       Mixed restrictive and obstructive lung disease (CMS-HCC)   [276652]       Mild intermittent asthma without complication   [585648]       Epigastric pain   [013749]       Pulmonary nodule   [820900]       Elevated fasting glucose   [361427]       Need for shingles vaccine   [376631]       Screen for colon cancer   [293413]         Vital Signs     Blood Pressure Pulse Temperature Height Weight Body Mass Index    118/86 mmHg 99 36.3 °C (97.3 °F) 1.702 m (5' 7\") 93.895 kg (207 lb) 32.41 kg/m2    Oxygen Saturation Smoking Status                90% Former Smoker          Basic Information     Date Of Birth Sex Race Ethnicity Preferred Language    1955 Female White Non- English      Your appointments     2017 10:20 AM   ANNUAL EXAM PREVENTATIVE with Elham Maria M.D.   [address] [facility] [id number] [phone]            2017 11:20 AM   Established Patient with Elham Maria M.D.   [address] [id number] [phone]           You will be receiving a confirmation call a few days before your appointment from our automated call confirmation system.              Problem List              ICD-10-CM Priority Class Noted - Resolved    Hyponatremia E87.1   3/4/2013 - Present   " Depression F32.9   3/4/2013 - Present    Anxiety F41.9   3/4/2013 - Present    Smoking history Z87.891   11/19/2014 - Present    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, J98.4   12/17/2014 - Present    BMI 33.0-33.9,adult Z68.33   3/28/2015 - Present    PTSD (post-traumatic stress disorder) F43.10   8/22/2015 - Present    Generalized pain R52   8/22/2015 - Present    COPD (chronic obstructive pulmonary disease) (CMS-HCC) J44.9   8/22/2015 - Present    Bipolar 1 disorder (CMS-HCC) F31.9   8/22/2015 - Present    Back pain with radiation M54.9   8/26/2015 - Present    DDD (degenerative disc disease), cervical M50.30   8/31/2015 - Present    Chronic hypoxemic respiratory failure (CMS-HCC) J96.11   8/31/2015 - Present    Pulmonary nodule R91.1   9/23/2015 - Present    Asthma J45.909   9/23/2015 - Present    Abnormal chest CT R93.8   10/22/2015 - Present    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   5/17/2016 - Present    Hypothyroidism due to acquired atrophy of thyroid E03.4   11/15/2016 - Present      Health Maintenance        Date Due Completion Dates    IMM DTaP/Tdap/Td Vaccine (1 - Tdap) 5/21/1972 ---    PAP SMEAR 5/21/1974 ---    IMM ZOSTER VACCINE 5/21/2013 ---    COLON CANCER SCREENING ANNUAL FIT 9/17/2016 9/17/2015    MAMMOGRAM 6/1/2018 (Originally 10/26/2016) 10/26/2015 (Postponed), 9/16/2013, 4/22/2008, 4/22/2008    Override on 10/26/2015: Postponed (pt elects to postpone 1 yr)            Current Immunizations     Influenza TIV (IM) 9/29/2014    Influenza Vaccine Quad Inj (Pf) 11/15/2016, 10/22/2015    Pneumococcal polysaccharide vaccine (PPSV-23) 9/29/2014      Below and/or attached are the medications your provider expects you to take. Review all of your home medications and newly ordered medications with your provider and/or pharmacist. Follow medication instructions as directed by your provider and/or pharmacist. Please keep your medication list with you and share with your provider. Update the  information when medications are discontinued, doses are changed, or new medications (including over-the-counter products) are added; and carry medication information at all times in the event of emergency situations     Allergies:  PENICILLINS - (reactions not documented)     NICODERM - Rash               Medications  Valid as of: June 16, 2017 - 10:28 AM    Generic Name Brand Name Tablet Size Instructions for use    Albuterol Sulfate (Aero Soln) albuterol 108 (90 BASE) MCG/ACT Inhale 2 Puffs by mouth every 6 hours as needed for Shortness of Breath.        ARIPiprazole (Tab) ABILIFY 10 MG Take 10 mg by mouth every day.        Baclofen (Tab) LIORESAL 10 MG Take 1 Tab by mouth 3 times a day.        DULoxetine HCl (Cap DR Particles) CYMBALTA 30 MG 30 mg every day. calms her down with pain        Fluticasone Propionate (Suspension) FLONASE 50 MCG/ACT Spray 1 Spray in nose every day.        HydrOXYzine HCl (Tab) ATARAX 50 MG Take 50 mg by mouth 3 times a day as needed.        Ibuprofen (Tab) MOTRIN 600 MG Take 600 mg by mouth every 6 hours as needed.        Levothyroxine Sodium (Tab) SYNTHROID 50 MCG TAKE ONE TABLET BY MOUTH ONCE DAILY IN THE MORNING ON  AN  EMPTY  STOMACH        Nystatin (Cream) MYCOSTATIN 439861 UNIT/GM APPLY ONE GRAM TOPICALLY TO AFFECTED AREA TWICE DAILY        Omeprazole (CAPSULE DELAYED RELEASE) PRILOSEC 20 MG TAKE ONE CAPSULE BY MOUTH ONCE DAILY        Tiotropium Bromide Monohydrate (Cap) SPIRIVA 18 MCG Inhale 1 Cap by mouth every day.        TraMADol HCl (Tab) ULTRAM 50 MG Take  mg by mouth every four hours as needed.        Zoster Vaccine Live (Recon Soln) ZOSTAVAX 30315 UNT/0.65ML Inject 0.65 mL as instructed Once for 1 dose.        .                 Medicines prescribed today were sent to:     [facility] [place] [facility] [place] - [address] [facility] [address]    [address] [facility] [place] [place] [address]    Phone: [phone] Fax: [phone]    Open 24 Hours?: No      Medication refill  instructions:       If your prescription bottle indicates you have medication refills left, it is not necessary to call your provider’s office. Please contact your pharmacy and they will refill your medication.    If your prescription bottle indicates you do not have any refills left, you may request refills at any time through one of the following ways: The online [employer] system (except Urgent Care), by calling your provider’s office, or by asking your pharmacy to contact your provider’s office with a refill request. Medication refills are processed only during regular business hours and may not be available until the next business day. Your provider may request additional information or to have a follow-up visit with you prior to refilling your medication.   *Please Note: Medication refills are assigned a new Rx number when refilled electronically. Your pharmacy may indicate that no refills were authorized even though a new prescription for the same medication is available at the pharmacy. Please request the medicine by name with the pharmacy before contacting your provider for a refill.        Your To Do List     Future Labs/Procedures Complete By Expires    H. PYLORI, UREA BREATH TEST, ADULT  As directed 6/16/2018    OCCULT BLOOD FECES IMMUNOASSAY  As directed 6/16/2018      Referral     A referral request has been sent to our patient care coordination department. Please allow 3-5 business days for us to process this request and contact you either by phone or mail. If you do not hear from us by the 5th business day, please call us at [phone].           [employer] Access Code: Activation code not generated  Current [employer] Status: Active

## 2017-06-16 NOTE — PROGRESS NOTES
SUBJECTIVE:        Chief Complaint   Patient presents with   • COPD     lab review       HPI:     Alex Shepard is a 62 y.o. female here for COPD/asthma follow up.     Asthma/COPD- has seen pulmonary in past but does not like to go downtown.   Suppose to be on spiriva- but not using it regularly because cannot afford it. Has been off inhalers as she has not been able to afford her inhalers. Was on dulera before. Uses the albuterol inhaler as needed.     Pulmonary nodule- per prior imaging, noted to be unchanged on last imaging- CT, thus likely benign.     Elevated fasting glucose on prior labs.     Hypothyroid-ran out of medication before she got blood work. Elevated TSH on labs.     Chronic pain-back. Tramadol, baclofen- per pain specialist.     Slightly elevated H/H- quit smoking.   A few years ago had overnight oximetry study done.     Recommend schedule for pap and vaccinations- Tdap and shingles.   Mammogram- reviewed recommendations.   PFT- reviewed recommendations.      Ref. Range 5/8/2017 09:13   WBC Latest Ref Range: 4.8-10.8 K/uL 6.7   RBC Latest Ref Range: 4.20-5.40 M/uL 5.32   Hemoglobin Latest Ref Range: 12.0-16.0 g/dL 16.1 (H)   Hematocrit Latest Ref Range: 37.0-47.0 % 47.8 (H)   MCV Latest Ref Range: 81.4-97.8 fL 89.8   MCH Latest Ref Range: 27.0-33.0 pg 30.3   MCHC Latest Ref Range: 33.6-35.0 g/dL 33.7   RDW Latest Ref Range: 35.9-50.0 fL 45.7   Platelet Count Latest Ref Range: 164-446 K/uL 239   MPV Latest Ref Range: 9.0-12.9 fL 9.4   Neutrophils-Polys Latest Ref Range: 44.00-72.00 % 59.70   Neutrophils (Absolute) Latest Ref Range: 2.00-7.15 K/uL 3.99   Lymphocytes Latest Ref Range: 22.00-41.00 % 32.10   Lymphs (Absolute) Latest Ref Range: 1.00-4.80 K/uL 2.15   Monocytes Latest Ref Range: 0.00-13.40 % 5.80   Monos (Absolute) Latest Ref Range: 0.00-0.85 K/uL 0.39   Eosinophils Latest Ref Range: 0.00-6.90 % 1.60   Eos (Absolute) Latest Ref Range: 0.00-0.51 K/uL 0.11   Basophils Latest Ref Range:  0.00-1.80 % 0.70   Baso (Absolute) Latest Ref Range: 0.00-0.12 K/uL 0.05   Immature Granulocytes Latest Ref Range: 0.00-0.90 % 0.10   Immature Granulocytes (abs) Latest Ref Range: 0.00-0.11 K/uL 0.01   Nucleated RBC Latest Units: /100 WBC 0.00   NRBC (Absolute) Latest Units: K/uL 0.00   Comments-Diff Unknown see below   Peripheral Smear Review Unknown see below   Sodium Latest Ref Range: 135-145 mmol/L 136   Potassium Latest Ref Range: 3.6-5.5 mmol/L 4.6   Chloride Latest Ref Range:  mmol/L 102   Co2 Latest Ref Range: 20-33 mmol/L 28   Anion Gap Latest Ref Range: 0.0-11.9  6.0   Glucose Latest Ref Range: 65-99 mg/dL 112 (H)   Bun Latest Ref Range: 8-22 mg/dL 11   Creatinine Latest Ref Range: 0.50-1.40 mg/dL 0.91   GFR If  Latest Ref Range: >60 mL/min/1.73 m 2 >60   GFR If Non  Latest Ref Range: >60 mL/min/1.73 m 2 >60   Calcium Latest Ref Range: 8.5-10.5 mg/dL 10.0   AST(SGOT) Latest Ref Range: 12-45 U/L 16   ALT(SGPT) Latest Ref Range: 2-50 U/L 13   Alkaline Phosphatase Latest Ref Range: 30-99 U/L 59   Total Bilirubin Latest Ref Range: 0.1-1.5 mg/dL 0.8   Albumin Latest Ref Range: 3.2-4.9 g/dL 4.4   Total Protein Latest Ref Range: 6.0-8.2 g/dL 7.4   Globulin Latest Ref Range: 1.9-3.5 g/dL 3.0   A-G Ratio Latest Units: g/dL 1.5   Cholesterol,Tot Latest Ref Range: 100-199 mg/dL 183   Triglycerides Latest Ref Range: 0-149 mg/dL 143   HDL Latest Ref Range: >=40 mg/dL 50   LDL Latest Ref Range: <100 mg/dL 104 (H)   TSH Latest Ref Range: 0.300-3.700 uIU/mL 9.660 (H)   Free T-4 Latest Ref Range: 0.53-1.43 ng/dL 0.69           ROS:  Denies any recent fevers or chills. No nausea or vomiting. No diarrhea. No chest pains or shortness of breath. No lower extremity edema.    Current Outpatient Prescriptions on File Prior to Visit   Medication Sig Dispense Refill   • nystatin (MYCOSTATIN) 993844 UNIT/GM Cream topical cream APPLY ONE GRAM TOPICALLY TO AFFECTED AREA TWICE DAILY 15 g 0   •  "ibuprofen (MOTRIN) 600 MG Tab Take 600 mg by mouth every 6 hours as needed.     • fluticasone (FLONASE) 50 MCG/ACT nasal spray Spray 1 Spray in nose every day.     • albuterol 108 (90 BASE) MCG/ACT Aero Soln inhalation aerosol Inhale 2 Puffs by mouth every 6 hours as needed for Shortness of Breath. 8.5 g 3   • tiotropium (SPIRIVA) 18 MCG Cap Inhale 1 Cap by mouth every day. 30 Cap 3   • omeprazole (PRILOSEC) 20 MG delayed-release capsule TAKE ONE CAPSULE BY MOUTH ONCE DAILY 30 Cap 3   • tramadol (ULTRAM) 50 MG Tab Take  mg by mouth every four hours as needed.     • hydrOXYzine (ATARAX) 50 MG Tab Take 50 mg by mouth 3 times a day as needed.     • duloxetine (CYMBALTA) 30 MG Cap DR Particles 30 mg every day. calms her down with pain     • aripiprazole (ABILIFY) 10 MG Tab Take 10 mg by mouth every day.     • baclofen (LIORESAL) 10 MG Tab Take 1 Tab by mouth 3 times a day. 60 Tab 1     No current facility-administered medications on file prior to visit.       Allergies   Allergen Reactions   • Penicillins      As child- has a lot of pcn- uncertain about reaction.    • Nicoderm [Nicotine] Rash     Rash where patch was placed.        Past Medical History   Diagnosis Date   • Psychiatric disorder 2005     depression, hallucinations- Dr. Taylor Kimble   • ASTHMA    • PTSD (post-traumatic stress disorder)      lost sister and mother in same, had 2 neck surgeries   • Anxiety    • Psychosis      acute   • Bipolar affect, depressed (CMS-[facility])    • Depression    • Hyponatremia    • H/O cervical spine surgery 2002 / 2005   • S/P repair of patent ductus arteriosus 1958   • S/P appendectomy 1968   • Cigarette smoker one half pack a day or less    • Chronic airway obstruction, not elsewhere classified    • Cholelithiasis            OBJECTIVE:   /86 mmHg  Pulse 99  Temp(Src) 36.3 °C (97.3 °F)  Ht 1.702 m (5' 7\")  Wt 93.895 kg (207 lb)  BMI 32.41 kg/m2  SpO2 90%  General: Well-developed well-nourished female, no acute " distress  Neck: supple, no lymphadenopathy- cervical or supraclavicular, no thyromegaly  Cardiovascular: regular rate and rhythm, no murmurs, gallops, rubs  Lungs: slightly prolonged expiratory phase throughout, otherwise clear to auscultation bilaterally, no wheezes, crackles, or rhonchi  Abdomen: +bowel sounds, soft, nontender, nondistended, no rebound, no guarding, no hepatosplenomegaly  Extremities: no cyanosis, clubbing, edema  Skin: Warm and dry  Psych: appropriate mood and affect      ASSESSMENT/PLAN:    62 year old female.     1. Chronic obstructive pulmonary disease, unspecified COPD type (CMS-HCC)- stable, but has been unable to afford medication. Patient to look into patient assistance programs for medications. Will refer to pulmonary as well.  PULMONARY FUNCTION TESTS Test requested: Complete Pulmonary Function Test   2. Mild intermittent asthma without complication- as above.   PULMONARY FUNCTION TESTS Test requested: Complete Pulmonary Function Test   3. Mixed restrictive and obstructive lung disease (CMS-HCC)- as above.   PULMONARY FUNCTION TESTS Test requested: Complete Pulmonary Function Test    REFERRAL TO PULMONOLOGY   4. Pulmonary nodule- stable on prior imaging, appears benign.   REFERRAL TO PULMONOLOGY   5. Epigastric pain  Modify diet, continue current medication.   H. PYLORI, UREA BREATH TEST, ADULT   6. Elevated fasting glucose- stable. Modify diet and lifestyle. Monitor.       7. Hypothyroidism due to acquired atrophy of thyroid- uncontrolled, has been off medication therapy. Patient to restart, recheck in 6 weeks, will need lab order.  TSH   8. Elevated hemoglobin (CMS-HCC)- likely due to pulmonary issues. As above.      9. Screen for colon cancer  OCCULT BLOOD FECES IMMUNOASSAY   10. Need for shingles vaccine  zoster vaccine live, PF, (ZOSTAVAX) 80621 UNT/0.65ML injection   11. Chronic pain syndrome- stable, medication per pain specialist.          Return in about 6 weeks (around  7/28/2017) for Long.    This medical record contains text that has been entered with the assistance of computer voice recognition and dictation software.  Therefore, it may contain unintended errors in text, spelling, punctuation, or grammar.

## 2017-06-20 PROBLEM — D58.2 ELEVATED HEMOGLOBIN (HCC): Status: ACTIVE | Noted: 2017-06-20

## 2017-07-12 RX ORDER — OMEPRAZOLE 20 MG/1
CAPSULE, DELAYED RELEASE ORAL
Qty: 30 CAP | Refills: 0 | Status: SHIPPED | OUTPATIENT
Start: 2017-07-12 | End: 2017-08-10 | Stop reason: SDUPTHER

## 2017-07-17 ENCOUNTER — HOSPITAL ENCOUNTER (OUTPATIENT)
Dept: OTHER | Facility: MEDICAL CENTER | Age: 62
End: 2017-07-17
Attending: FAMILY MEDICINE
Payer: MEDICARE

## 2017-07-17 PROCEDURE — 94060 EVALUATION OF WHEEZING: CPT

## 2017-07-17 PROCEDURE — 94060 EVALUATION OF WHEEZING: CPT | Mod: 26 | Performed by: INTERNAL MEDICINE

## 2017-07-17 PROCEDURE — 94726 PLETHYSMOGRAPHY LUNG VOLUMES: CPT | Mod: 26 | Performed by: INTERNAL MEDICINE

## 2017-07-17 PROCEDURE — 94729 DIFFUSING CAPACITY: CPT | Mod: 26 | Performed by: INTERNAL MEDICINE

## 2017-07-17 PROCEDURE — 94729 DIFFUSING CAPACITY: CPT

## 2017-07-17 PROCEDURE — 94726 PLETHYSMOGRAPHY LUNG VOLUMES: CPT

## 2017-07-17 ASSESSMENT — PULMONARY FUNCTION TESTS
FEV1_PERCENT_CHANGE: 23
FEV1/FVC: 62
FEV1_PERCENT_PREDICTED: 27
FEV1_PERCENT_PREDICTED: 34
FVC: 1.85
FEV1/FVC_PERCENT_CHANGE: 39
FVC: 1.16
FVC_PERCENT_PREDICTED: 34
FVC_PERCENT_PREDICTED: 55
FEV1/FVC_PERCENT_PREDICTED: 62
FEV1/FVC_PERCENT_PREDICTED: 78
FEV1/FVC_PERCENT_PREDICTED: 79
FEV1_PREDICTED: 2.58
FVC_PREDICTED: 3.32
FEV1: .72
FEV1/FVC: 48.11
FEV1_PERCENT_CHANGE: 59
FEV1: .89

## 2017-07-18 NOTE — PROCEDURES
Lung function testing completed July 17,2017.  Severe obstruction evident, FEV1 0.72 L. Bronchodilator response significant.  Severe hyperinflation.  Low oxygen transfers suggests emphysema. With  good effort noted

## 2017-07-19 ENCOUNTER — TELEPHONE (OUTPATIENT)
Dept: MEDICAL GROUP | Age: 62
End: 2017-07-19

## 2017-07-19 NOTE — TELEPHONE ENCOUNTER
ESTABLISHED PATIENT PRE-VISIT PLANNING     Note: Patient will not be contacted if there is no indication to call.     1.  Reviewed notes from the last few office visits within the medical group: Yes    2.  If any orders were placed at last visit or intended to be done for this visit (i.e. 6 mos follow-up), do we have Results/Consult Notes?        •  Labs - patient coming in for pap.       •  Imaging - Imaging was not ordered at last office visit.       •  Referrals - Referral ordered, patient was seen and consult notes are in chart. Care Teams updated  YES.    3. Is this appointment scheduled as a Hospital Follow-Up? No    4.  Immunizations were updated in Epic using WebIZ?: Epic matches WebIZ       •  Web Iz Recommendations: FLU, HEPATITIS A , TDAP and ZOSTAVAX (Shingles)    5.  Patient is due for the following Health Maintenance Topics:   Health Maintenance Due   Topic Date Due   • IMM DTaP/Tdap/Td Vaccine (1 - Tdap) 05/21/1972   • PAP SMEAR  05/21/1974   • IMM ZOSTER VACCINE  05/21/2013   • PFT SCREENING-FEV1 AND FEV/FVC RATIO / SPIROMETRY SHOULD BE PERFORMED ANNUALLY  12/12/2015   • COLON CANCER SCREENING ANNUAL FIT  09/17/2016           6.  Patient was NOT informed to arrive 15 min prior to their scheduled appointment and bring in their medication bottles.

## 2017-07-20 ENCOUNTER — OFFICE VISIT (OUTPATIENT)
Dept: MEDICAL GROUP | Age: 62
End: 2017-07-20
Payer: MEDICARE

## 2017-07-20 ENCOUNTER — HOSPITAL ENCOUNTER (OUTPATIENT)
Facility: MEDICAL CENTER | Age: 62
End: 2017-07-20
Attending: FAMILY MEDICINE
Payer: MEDICARE

## 2017-07-20 VITALS
DIASTOLIC BLOOD PRESSURE: 60 MMHG | HEIGHT: 67 IN | WEIGHT: 208.6 LBS | SYSTOLIC BLOOD PRESSURE: 110 MMHG | HEART RATE: 94 BPM | OXYGEN SATURATION: 91 % | TEMPERATURE: 97.9 F | BODY MASS INDEX: 32.74 KG/M2

## 2017-07-20 DIAGNOSIS — Z12.4 SCREENING FOR CERVICAL CANCER: ICD-10-CM

## 2017-07-20 DIAGNOSIS — R13.10 DYSPHAGIA, UNSPECIFIED TYPE: ICD-10-CM

## 2017-07-20 DIAGNOSIS — J44.9 CHRONIC OBSTRUCTIVE PULMONARY DISEASE, UNSPECIFIED COPD TYPE (HCC): ICD-10-CM

## 2017-07-20 DIAGNOSIS — Z12.39 SCREENING FOR BREAST CANCER: ICD-10-CM

## 2017-07-20 DIAGNOSIS — Z01.419 WELL WOMAN EXAM WITH ROUTINE GYNECOLOGICAL EXAM: ICD-10-CM

## 2017-07-20 DIAGNOSIS — E03.4 HYPOTHYROIDISM DUE TO ACQUIRED ATROPHY OF THYROID: ICD-10-CM

## 2017-07-20 DIAGNOSIS — R10.13 EPIGASTRIC PAIN: ICD-10-CM

## 2017-07-20 DIAGNOSIS — D58.2 ELEVATED HEMOGLOBIN (HCC): ICD-10-CM

## 2017-07-20 DIAGNOSIS — Z78.0 POSTMENOPAUSAL: ICD-10-CM

## 2017-07-20 DIAGNOSIS — J45.30 MILD PERSISTENT ASTHMA WITHOUT COMPLICATION: ICD-10-CM

## 2017-07-20 PROCEDURE — G0101 CA SCREEN;PELVIC/BREAST EXAM: HCPCS | Mod: 59 | Performed by: FAMILY MEDICINE

## 2017-07-20 PROCEDURE — 88175 CYTOPATH C/V AUTO FLUID REDO: CPT

## 2017-07-20 PROCEDURE — 87624 HPV HI-RISK TYP POOLED RSLT: CPT

## 2017-07-20 PROCEDURE — 99214 OFFICE O/P EST MOD 30 MIN: CPT | Mod: 25 | Performed by: FAMILY MEDICINE

## 2017-07-20 RX ORDER — IPRATROPIUM BROMIDE AND ALBUTEROL SULFATE 2.5; .5 MG/3ML; MG/3ML
3 SOLUTION RESPIRATORY (INHALATION) EVERY 6 HOURS PRN
Qty: 30 BULLET | Refills: 3 | Status: SHIPPED | OUTPATIENT
Start: 2017-07-20 | End: 2018-10-29

## 2017-07-20 NOTE — MR AVS SNAPSHOT
"        Alex Shepard   2017 10:20 AM   Office Visit   MRN: [id number]    Department:  [address] [place] [address]   Dept Phone:  [phone]    Description:  Female : 1955   Provider:  Elham Maria M.D.           Reason for Visit     Gynecologic Exam           Allergies as of 2017     Allergen Noted Reactions    Penicillins 2013       As child- has a lot of pcn- uncertain about reaction.     Nicoderm [Nicotine] 2015   Rash    Rash where patch was placed.       You were diagnosed with     Well woman exam with routine gynecological exam   [173227]       Screening for cervical cancer   [271067]       Screening for breast cancer   [256973]       Postmenopausal   [447870]       Epigastric pain   [968461]       Dysphagia, unspecified type   [0234658]       Chronic obstructive pulmonary disease, unspecified COPD type (CMS-[facility])   [1368896]         Vital Signs     Blood Pressure Pulse Temperature Height Weight Body Mass Index    110/60 mmHg 94 36.6 °C (97.9 °F) 1.702 m (5' 7\") 94.62 kg (208 lb 9.6 oz) 32.66 kg/m2    Oxygen Saturation Smoking Status                91% Former Smoker          Basic Information     Date Of Birth Sex Race Ethnicity Preferred Language    1955 Female White Non- English      Your appointments     Aug 18, 2017 10:20 AM   Established Patient with Elham Maria M.D.   [address] [address] [phone]           You will be receiving a confirmation call a few days before your appointment from our automated call confirmation system.            Oct 26, 2017 11:00 AM   XRAY 15 with PULMONARY DX 1   [facility] - Pulmonary ([address])    [address] [place] [id number]               Oct 26, 2017 11:20 AM   New Patient Pulmonary with A Rotation   [facility] (--)    [address] [place] [id number] [phone]              Problem List              ICD-10-CM " Priority Class Noted - Resolved    Hyponatremia E87.1   3/4/2013 - Present    Depression F32.9   3/4/2013 - Present    Anxiety F41.9   3/4/2013 - Present    Smoking history Z87.891   11/19/2014 - Present    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, J98.4   12/17/2014 - Present    BMI 33.0-33.9,adult Z68.33   3/28/2015 - Present    PTSD (post-traumatic stress disorder) F43.10   8/22/2015 - Present    Generalized pain R52   8/22/2015 - Present    COPD (chronic obstructive pulmonary disease) (CMS-HCC) J44.9   8/22/2015 - Present    Bipolar 1 disorder (CMS-HCC) F31.9   8/22/2015 - Present    Back pain with radiation M54.9   8/26/2015 - Present    DDD (degenerative disc disease), cervical M50.30   8/31/2015 - Present    Chronic hypoxemic respiratory failure (CMS-HCC) J96.11   8/31/2015 - Present    Pulmonary nodule R91.1   9/23/2015 - Present    Asthma J45.909   9/23/2015 - Present    Abnormal chest CT R93.8   10/22/2015 - Present    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   5/17/2016 - Present    Hypothyroidism due to acquired atrophy of thyroid E03.4   11/15/2016 - Present    Elevated hemoglobin (CMS-HCC) D58.2   6/20/2017 - Present      Health Maintenance        Date Due Completion Dates    IMM DTaP/Tdap/Td Vaccine (1 - Tdap) 5/21/1972 ---    PAP SMEAR 5/21/1974 ---    IMM ZOSTER VACCINE 5/21/2013 ---    COLON CANCER SCREENING ANNUAL FIT 9/17/2016 9/17/2015    MAMMOGRAM 6/1/2018 (Originally 10/26/2016) 10/26/2015 (Postponed), 9/16/2013, 4/22/2008, 4/22/2008    Override on 10/26/2015: Postponed (pt elects to postpone 1 yr)    IMM INFLUENZA (1) 9/1/2017 11/15/2016, 10/22/2015, 9/29/2014            Current Immunizations     Influenza TIV (IM) 9/29/2014    Influenza Vaccine Quad Inj (Pf) 11/15/2016, 10/22/2015    Pneumococcal polysaccharide vaccine (PPSV-23) 9/29/2014      Below and/or attached are the medications your provider expects you to take. Review all of your home medications and newly ordered medications with  your provider and/or pharmacist. Follow medication instructions as directed by your provider and/or pharmacist. Please keep your medication list with you and share with your provider. Update the information when medications are discontinued, doses are changed, or new medications (including over-the-counter products) are added; and carry medication information at all times in the event of emergency situations     Allergies:  PENICILLINS - (reactions not documented)     NICODERM - Rash               Medications  Valid as of: July 20, 2017 - 11:18 AM    Generic Name Brand Name Tablet Size Instructions for use    Albuterol Sulfate (Aero Soln) albuterol 108 (90 BASE) MCG/ACT Inhale 2 Puffs by mouth every 6 hours as needed for Shortness of Breath.        ARIPiprazole (Tab) ABILIFY 10 MG Take 10 mg by mouth every day.        Baclofen (Tab) LIORESAL 10 MG Take 1 Tab by mouth 3 times a day.        DULoxetine HCl (Cap DR Particles) CYMBALTA 30 MG 30 mg every day. calms her down with pain        Fluticasone Propionate (Suspension) FLONASE 50 MCG/ACT Spray 1 Spray in nose every day.        HydrOXYzine HCl (Tab) ATARAX 50 MG Take 50 mg by mouth 3 times a day as needed.        Ibuprofen (Tab) MOTRIN 600 MG Take 600 mg by mouth every 6 hours as needed.        Ipratropium-Albuterol (Solution) DUONEB 0.5-2.5 (3) MG/3ML 3 mL by Nebulization route every 6 hours as needed for Shortness of Breath.        Levothyroxine Sodium (Tab) SYNTHROID 50 MCG TAKE ONE TABLET BY MOUTH ONCE DAILY IN THE MORNING ON  AN  EMPTY  STOMACH        Nystatin (Cream) MYCOSTATIN 152974 UNIT/GM APPLY ONE GRAM TOPICALLY TO AFFECTED AREA TWICE DAILY        Omeprazole (CAPSULE DELAYED RELEASE) PRILOSEC 20 MG TAKE ONE CAPSULE BY MOUTH ONCE DAILY        Omeprazole (CAPSULE DELAYED RELEASE) PRILOSEC 20 MG TAKE ONE CAPSULE BY MOUTH ONCE DAILY        Tiotropium Bromide Monohydrate (Cap) SPIRIVA 18 MCG Inhale 1 Cap by mouth every day.        TraMADol HCl (Tab) ULTRAM 50  MG Take  mg by mouth every four hours as needed.        .                 Medicines prescribed today were sent to:     [id number]  [place], [place] - [address]    [address] [place] [address]    Phone: [phone] Fax: [phone]    Open 24 Hours?: No      Medication refill instructions:       If your prescription bottle indicates you have medication refills left, it is not necessary to call your provider’s office. Please contact your pharmacy and they will refill your medication.    If your prescription bottle indicates you do not have any refills left, you may request refills at any time through one of the following ways: The online [employer] system (except Urgent Care), by calling your provider’s office, or by asking your pharmacy to contact your provider’s office with a refill request. Medication refills are processed only during regular business hours and may not be available until the next business day. Your provider may request additional information or to have a follow-up visit with you prior to refilling your medication.   *Please Note: Medication refills are assigned a new Rx number when refilled electronically. Your pharmacy may indicate that no refills were authorized even though a new prescription for the same medication is available at the pharmacy. Please request the medicine by name with the pharmacy before contacting your provider for a refill.        Your To Do List     Future Labs/Procedures Complete By Expires    DS-BONE DENSITY STUDY (DEXA)  As directed 1/17/2018    DX-ESOPHAGUS - BARIUM SWALLOW  As directed 7/20/2018    MA-SCREEN MAMMO W/CAD-BILAT  As directed 8/19/2018    THINPREP PAP WITH HPV  As directed 7/20/2018         [employer] Access Code: Activation code not generated  Current [employer] Status: Active

## 2017-07-20 NOTE — PROGRESS NOTES
Chief Complaint   Patient presents with   • Gynecologic Exam       <SUBJECTIVE>  Alex Shepard is a 62 y.o. female for routine annual evaluation.     Obstetric History       T0      TAB0   SAB1   E0   M0   L0        H/H mildly elevated- occasionally smoking currenlty. Declined overnight test. She will wait to see pulmonary in October.   Hypothyroidism- TSH elevated- levothyroxine 50 mcg daily currently. Tolerates well.   IFG- mild on last labs.   Copd/asthma- not on spiriva regularly due to cost, albuterol as needed. Has dulera to use as needed.   Recommended pt assistance program for medications. Declined oxygen therapy. She will wait to see pulmonary.   Had 1/2 ovary and appendix removed.   Epigastric pain better, hx of cholecystectomy. Taking prilosec.   Sometimes gets caught with swallowing.      Procedures     1. PFT DICTATED RESULTS [[id number] (Custom)]            Expand All Collapse All    Lung function testing completed .  Severe obstruction evident, FEV1 0.72 L. Bronchodilator response significant.  Severe hyperinflation.  Low oxygen transfers suggests emphysema. With  good effort noted              Health Maintenance:  Last pap: has been a while  History of abnormal pap: never  Diet: eating healthy  Calcium: limited diet, due to her teeth  Exercise: walks to her neighbors  Immunizations: Plans for shingles in future. Tdap - reviewed recommendations.   Mammogram: , due  Colonoscopy: plans to complete FIT  Dexa Scan: never, due      Current Outpatient Prescriptions   Medication Sig Dispense Refill   • ipratropium-albuterol (DUONEB) 0.5-2.5 (3) MG/3ML nebulizer solution 3 mL by Nebulization route every 6 hours as needed for Shortness of Breath. 30 Bullet 3   • omeprazole (PRILOSEC) 20 MG delayed-release capsule TAKE ONE CAPSULE BY MOUTH ONCE DAILY 30 Cap 0   • levothyroxine (SYNTHROID) 50 MCG Tab TAKE ONE TABLET BY MOUTH ONCE DAILY IN THE MORNING ON  AN  EMPTY  STOMACH 30 Tab 3    • nystatin (MYCOSTATIN) 747021 UNIT/GM Cream topical cream APPLY ONE GRAM TOPICALLY TO AFFECTED AREA TWICE DAILY 15 g 0   • ibuprofen (MOTRIN) 600 MG Tab Take 600 mg by mouth every 6 hours as needed.     • fluticasone (FLONASE) 50 MCG/ACT nasal spray Spray 1 Spray in nose every day.     • albuterol 108 (90 BASE) MCG/ACT Aero Soln inhalation aerosol Inhale 2 Puffs by mouth every 6 hours as needed for Shortness of Breath. 8.5 g 3   • tiotropium (SPIRIVA) 18 MCG Cap Inhale 1 Cap by mouth every day. 30 Cap 3   • tramadol (ULTRAM) 50 MG Tab Take  mg by mouth every four hours as needed.     • hydrOXYzine (ATARAX) 50 MG Tab Take 50 mg by mouth 3 times a day as needed.     • duloxetine (CYMBALTA) 30 MG Cap DR Particles 30 mg every day. calms her down with pain     • aripiprazole (ABILIFY) 10 MG Tab Take 10 mg by mouth every day.     • baclofen (LIORESAL) 10 MG Tab Take 1 Tab by mouth 3 times a day. 60 Tab 1   • omeprazole (PRILOSEC) 20 MG delayed-release capsule TAKE ONE CAPSULE BY MOUTH ONCE DAILY (Patient not taking: Reported on 2017) 30 Cap 3     No current facility-administered medications for this visit.     Drug allergies: Penicillins and Nicoderm    Past Medical History   Diagnosis Date   • Psychiatric disorder      depression, hallucinations- Dr. Taylor Kimble   • ASTHMA    • PTSD (post-traumatic stress disorder)      lost sister and mother in same, had 2 neck surgeries   • Anxiety    • Psychosis      acute   • Bipolar affect, depressed (CMS-[facility])    • Depression    • Hyponatremia    • H/O cervical spine surgery 2002   • S/P repair of patent ductus arteriosus    • S/P appendectomy    • Cigarette smoker one half pack a day or less    • Chronic airway obstruction, not elsewhere classified    • Cholelithiasis        Past Surgical History   Procedure Laterality Date   • Other cardiac surgery       open heart in 1950- PDA   • Other  2005     neck surgery   • Other       , took  "out ovary or cyst   • Appendectomy     • Star by laparoscopy Bilateral 5/21/2016     Procedure: STAR BY LAPAROSCOPY;  Surgeon: Hany Encinas M.D.;  Location: SURGERY [facility];  Service:        Family History   Problem Relation Age of Onset   • Psychiatry Mother      depression   • Other Mother      TIA   • Diabetes Mother    • Psychiatry Sister      depression   • Cancer Father      prostate;skin       Social History     Social History   • Marital Status:      Spouse Name: N/A   • Number of Children: N/A   • Years of Education: N/A     Occupational History   • on disability- depression/psychosis      Social History Main Topics   • Smoking status: Former Smoker -- 0.50 packs/day for 15 years     Types: Cigarettes     Start date: 01/01/1999     Quit date: 08/20/2015   • Smokeless tobacco: Never Used   • Alcohol Use: No      Comment: occas glass of wine, Agrees to stop use   • Drug Use: No   • Sexual Activity: No     Other Topics Concern   • Not on file     Social History Narrative    , 2 children.        ROS:  No fevers/chills. No TIA's or unusual headaches, no dysphagia. No prolonged cough. No dyspnea or chest pain on exertion.  No abdominal pain, change in bowel habits, black or bloody stools.  No urinary tract symptoms. On self exam, has noted no new or enlarging breast lumps, nipple discharge or breast pain. Gyn: No abnormal discharge or bleeding.       <OBJECTIVE>  /60 mmHg  Pulse 94  Temp(Src) 36.6 °C (97.9 °F)  Ht 1.702 m (5' 7\")  Wt 94.62 kg (208 lb 9.6 oz)  BMI 32.66 kg/m2  SpO2 91%  HEAD AND NECK:  Ears normal.  Throat, oral cavity and tongue normal.  Neck supple. No adenopathy or masses in the neck or supraclavicular regions.  No carotid bruits. No thyromegaly.  NEURO: Cranial nerves are normal. Neck supple. DTR's normal and symmetric.  CHEST:  Clear, good air entry, no wheezes, rhonchi or rales.  HEART:  S1 and S2 normal, no murmurs, clicks, gallops or rubs. " Regular rate and rhythm.  No edema.  ABDOMEN:  Soft without tenderness, guarding, mass or organomegaly. No CVA tenderness or inguinal adenopathy.  EXTREMITIES:  Extremities, reflexes and peripheral pulses are normal.  SKIN:  No rashes or suspicious skin lesions noted.  BREAST EXAM: Inspection negative. No nipple discharge or bleeding. No masses or nodularity palpable. No axillary LEENA.   PELVIC EXAM: External genitalia and vagina normal. Normal appearing cervix- cervical stenosis noted. No abnormal discharge. No cervical motion tenderness. Bimanual exam without adnexal masses or tenderness to palpation.   Rectal Exam: normal sphincter tone, no masses. Stool guaiac negative.       ASSESSMENT/PLAN:    62 year old female.     1. Well woman exam with routine gynecological exam  -Discussed calcium intake, healthy diet, and routine exercise.      2. Screening for cervical cancer  THINPREP PAP WITH HPV   3. Screening for breast cancer  CANCELED: MA-SCREEN MAMMO W/CAD-BILAT   4. Postmenopausal- recommend dexa scan.   DS-BONE DENSITY STUDY (DEXA)   5. Elevated hemoglobin (CMS-HCC)- mild, likely due to pulmonary issues.      6. Hypothyroidism due to acquired atrophy of thyroid- elevated TSH. Monitor TSH, repeat labs.      7. Chronic obstructive pulmonary disease, unspecified COPD type (CMS-HCC)- recommend use of inhaled medications.   ipratropium-albuterol (DUONEB) 0.5-2.5 (3) MG/3ML nebulizer solution   8. Mild persistent asthma without complication- recommend use of inhaled medications. She will follow up with pulmonary.       9. Epigastric pain- currently on prilosec therapy. Monitor. As below. Otherwise recommend follow up with GI.   DX-ESOPHAGUS - BARIUM SWALLOW   10. Dysphagia, unspecified type- will evaluate further.   DX-ESOPHAGUS - BARIUM SWALLOW       Return in about 6 weeks (around 8/31/2017).  Follow up for routine annual.

## 2017-07-21 LAB
CYTOLOGY REG CYTOL: NORMAL
HPV HR 12 DNA CVX QL NAA+PROBE: NEGATIVE
HPV16 DNA SPEC QL NAA+PROBE: NEGATIVE
HPV18 DNA SPEC QL NAA+PROBE: NEGATIVE
SPECIMEN SOURCE: NORMAL

## 2017-07-25 ENCOUNTER — TELEPHONE (OUTPATIENT)
Dept: MEDICAL GROUP | Age: 62
End: 2017-07-25

## 2017-07-25 NOTE — TELEPHONE ENCOUNTER
Phone Number Called: [phone] (home)     Message: Left VM for patient to call back    Left Message for patient to call back: yes

## 2017-07-25 NOTE — TELEPHONE ENCOUNTER
----- Message from Elham Maria M.D. sent at 7/25/2017  2:39 PM PDT -----  Please let pt know her pap was negative, high risk hpv was also negative.

## 2017-07-26 NOTE — TELEPHONE ENCOUNTER
Phone Number Called: [phone] (home)     Message: Pt notified of Pap smear results.    Left Message for patient to call back: N\A

## 2017-07-31 ENCOUNTER — HOSPITAL ENCOUNTER (OUTPATIENT)
Dept: RADIOLOGY | Facility: MEDICAL CENTER | Age: 62
End: 2017-07-31
Attending: FAMILY MEDICINE
Payer: MEDICARE

## 2017-07-31 DIAGNOSIS — R10.13 EPIGASTRIC PAIN: ICD-10-CM

## 2017-07-31 DIAGNOSIS — R13.10 DYSPHAGIA, UNSPECIFIED TYPE: ICD-10-CM

## 2017-07-31 PROCEDURE — 700101 HCHG RX REV CODE 250: Performed by: FAMILY MEDICINE

## 2017-07-31 PROCEDURE — 74220 X-RAY XM ESOPHAGUS 1CNTRST: CPT

## 2017-07-31 RX ADMIN — BARIUM SULFATE 700 MG: 700 TABLET ORAL at 14:00

## 2017-08-07 ENCOUNTER — TELEPHONE (OUTPATIENT)
Dept: MEDICAL GROUP | Age: 62
End: 2017-08-07

## 2017-08-07 NOTE — TELEPHONE ENCOUNTER
----- Message from Elham Maria M.D. sent at 8/7/2017 11:23 AM PDT -----  Please remind pt of fv scheduled to review labs.

## 2017-08-11 RX ORDER — OMEPRAZOLE 20 MG/1
CAPSULE, DELAYED RELEASE ORAL
Qty: 30 CAP | Refills: 0 | Status: SHIPPED | OUTPATIENT
Start: 2017-08-11 | End: 2017-10-21 | Stop reason: SDUPTHER

## 2017-08-11 RX ORDER — NYSTATIN 100000 U/G
CREAM TOPICAL
Qty: 15 TUBE | Refills: 0 | Status: SHIPPED | OUTPATIENT
Start: 2017-08-11 | End: 2017-10-21 | Stop reason: SDUPTHER

## 2017-08-17 ENCOUNTER — TELEPHONE (OUTPATIENT)
Dept: MEDICAL GROUP | Age: 62
End: 2017-08-17

## 2017-08-17 ENCOUNTER — HOSPITAL ENCOUNTER (OUTPATIENT)
Dept: LAB | Facility: MEDICAL CENTER | Age: 62
End: 2017-08-17
Attending: FAMILY MEDICINE
Payer: MEDICARE

## 2017-08-17 DIAGNOSIS — E03.4 HYPOTHYROIDISM DUE TO ACQUIRED ATROPHY OF THYROID: ICD-10-CM

## 2017-08-17 DIAGNOSIS — R10.13 EPIGASTRIC PAIN: ICD-10-CM

## 2017-08-17 LAB — TSH SERPL DL<=0.005 MIU/L-ACNC: 4.18 UIU/ML (ref 0.3–3.7)

## 2017-08-17 PROCEDURE — 84443 ASSAY THYROID STIM HORMONE: CPT

## 2017-08-17 PROCEDURE — 36415 COLL VENOUS BLD VENIPUNCTURE: CPT

## 2017-08-17 NOTE — TELEPHONE ENCOUNTER
ESTABLISHED PATIENT PRE-VISIT PLANNING     Note: Patient will not be contacted if there is no indication to call.     1.  Reviewed notes from the last few office visits within the medical group: Yes    2.  If any orders were placed at last visit or intended to be done for this visit (i.e. 6 mos follow-up), do we have Results/Consult Notes?        •  Labs - Labs ordered, completed and results are in chart.       •  Imaging - scheduled       •  Referrals - scheduled    3. Is this appointment scheduled as a Hospital Follow-Up? No    4.  Immunizations were updated in Epic using WebIZ?: Epic matches WebIZ       •  Web Iz Recommendations: FLU, TDAP and ZOSTAVAX (Shingles)    5.  Patient is due for the following Health Maintenance Topics:   Health Maintenance Due   Topic Date Due   • IMM DTaP/Tdap/Td Vaccine (1 - Tdap) 05/21/1972   • IMM ZOSTER VACCINE  05/21/2013   • PFT SCREENING-FEV1 AND FEV/FVC RATIO / SPIROMETRY SHOULD BE PERFORMED ANNUALLY  12/12/2015   • COLON CANCER SCREENING ANNUAL FIT  09/17/2016           6.  Patient was NOT informed to arrive 15 min prior to their scheduled appointment and bring in their medication bottles.

## 2017-08-18 ENCOUNTER — OFFICE VISIT (OUTPATIENT)
Dept: MEDICAL GROUP | Age: 62
End: 2017-08-18
Payer: MEDICARE

## 2017-08-18 VITALS
WEIGHT: 209.8 LBS | OXYGEN SATURATION: 95 % | SYSTOLIC BLOOD PRESSURE: 136 MMHG | BODY MASS INDEX: 32.93 KG/M2 | HEART RATE: 88 BPM | DIASTOLIC BLOOD PRESSURE: 68 MMHG | HEIGHT: 67 IN | TEMPERATURE: 97 F

## 2017-08-18 DIAGNOSIS — B37.2 CANDIDIASIS, INTERTRIGO: ICD-10-CM

## 2017-08-18 DIAGNOSIS — E03.4 HYPOTHYROIDISM DUE TO ACQUIRED ATROPHY OF THYROID: ICD-10-CM

## 2017-08-18 DIAGNOSIS — K22.4 ESOPHAGEAL SPASM: ICD-10-CM

## 2017-08-18 PROCEDURE — 99214 OFFICE O/P EST MOD 30 MIN: CPT | Performed by: FAMILY MEDICINE

## 2017-08-18 RX ORDER — LEVOTHYROXINE SODIUM 0.07 MG/1
75 TABLET ORAL
Qty: 30 TAB | Refills: 3 | Status: SHIPPED | OUTPATIENT
Start: 2017-08-18 | End: 2017-12-13 | Stop reason: SDUPTHER

## 2017-08-18 RX ORDER — NYSTATIN 100000 [USP'U]/G
1 POWDER TOPICAL 4 TIMES DAILY
Qty: 15 G | Refills: 3 | Status: SHIPPED | OUTPATIENT
Start: 2017-08-18 | End: 2017-12-13 | Stop reason: SDUPTHER

## 2017-08-18 NOTE — PROGRESS NOTES
SUBJECTIVE:      Chief Complaint   Patient presents with   • Follow-Up     Test FV       HPI:     Alex Shepard is a 62 y.o. female here for follow-up of prior imaging studies.  She does have a barium swallow test which shows some esophageal spasm. She has not previously seen a gastroenterologist.  She does feel something when she swallows.     Hypothyroidism- she is currently on levothyroxine 50 µg daily. TSH appears to be improving. She is tolerating her medication well.    She has been using the cream for the rash under her right breast but would like to try the powder as the rash has been somewhat persistent.    She is no plan for her DEXA scan in September but would like to defer the tests due to cost and insurance issues. She would also like to defer her mammogram.  She does still need to complete her FIT test.      ROS:  Denies any recent fevers or chills. No nausea or vomiting. No diarrhea. No chest pains or shortness of breath. No lower extremity edema.    Current Outpatient Prescriptions on File Prior to Visit   Medication Sig Dispense Refill   • omeprazole (PRILOSEC) 20 MG delayed-release capsule TAKE ONE CAPSULE BY MOUTH ONCE DAILY 30 Cap 0   • nystatin (MYCOSTATIN) 761757 UNIT/GM Cream topical cream APPLY ONE GRAM TOPICALLY TO AFFECTED AREA(S) TWICE DAILY 15 Tube 0   • ipratropium-albuterol (DUONEB) 0.5-2.5 (3) MG/3ML nebulizer solution 3 mL by Nebulization route every 6 hours as needed for Shortness of Breath. 30 Bullet 3   • levothyroxine (SYNTHROID) 50 MCG Tab TAKE ONE TABLET BY MOUTH ONCE DAILY IN THE MORNING ON  AN  EMPTY  STOMACH 30 Tab 3   • ibuprofen (MOTRIN) 600 MG Tab Take 600 mg by mouth every 6 hours as needed.     • fluticasone (FLONASE) 50 MCG/ACT nasal spray Spray 1 Spray in nose every day.     • albuterol 108 (90 BASE) MCG/ACT Aero Soln inhalation aerosol Inhale 2 Puffs by mouth every 6 hours as needed for Shortness of Breath. 8.5 g 3   • tramadol (ULTRAM) 50 MG Tab Take  mg by  mouth every four hours as needed.     • hydrOXYzine (ATARAX) 50 MG Tab Take 50 mg by mouth 3 times a day as needed.     • duloxetine (CYMBALTA) 30 MG Cap DR Particles 30 mg every day. calms her down with pain     • aripiprazole (ABILIFY) 10 MG Tab Take 10 mg by mouth every day.     • baclofen (LIORESAL) 10 MG Tab Take 1 Tab by mouth 3 times a day. 60 Tab 1   • tiotropium (SPIRIVA) 18 MCG Cap Inhale 1 Cap by mouth every day. 30 Cap 3   • omeprazole (PRILOSEC) 20 MG delayed-release capsule TAKE ONE CAPSULE BY MOUTH ONCE DAILY (Patient not taking: Reported on 7/20/2017) 30 Cap 3     No current facility-administered medications on file prior to visit.       Allergies   Allergen Reactions   • Penicillins      As child- has a lot of pcn- uncertain about reaction.    • Nicoderm [Nicotine] Rash     Rash where patch was placed.        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   • Abnormal chest CT 10/22/2015   • Pulmonary nodule 09/23/2015   • Asthma 09/23/2015   • DDD (degenerative disc disease), cervical 08/31/2015   • Chronic hypoxemic respiratory failure (CMS-HCC) 08/31/2015   • Back pain with radiation 08/26/2015   • PTSD (post-traumatic stress disorder) 08/22/2015   • Generalized pain 08/22/2015   • COPD (chronic obstructive pulmonary disease) (CMS-HCC) 08/22/2015   • Bipolar 1 disorder (CMS-HCC) 08/22/2015   • BMI 33.0-33.9,adult 03/28/2015   • 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 Taylor Kimble   • ASTHMA    • PTSD (post-traumatic stress disorder)      lost sister and mother in same, had 2 neck surgeries   • Anxiety    •  "Psychosis      acute   • Bipolar affect, depressed (CMS-[facility])    • Depression    • Hyponatremia    • H/O cervical spine surgery 2002 / 2005   • S/P repair of patent ductus arteriosus 1958   • S/P appendectomy 1968   • Cigarette smoker one half pack a day or less    • Chronic airway obstruction, not elsewhere classified    • Cholelithiasis              OBJECTIVE:   /68 mmHg  Pulse 88  Temp(Src) 36.1 °C (97 °F)  Ht 1.702 m (5' 7.01\")  Wt 95.165 kg (209 lb 12.8 oz)  BMI 32.85 kg/m2  SpO2 95%  General: Well-developed well-nourished female, no acute distress  Neck: supple, no lymphadenopathy- cervical or supraclavicular, no thyromegaly  Cardiovascular: regular rate and rhythm, no murmurs, gallops, rubs  Lungs: clear to auscultation bilaterally, no wheezes, crackles, or rhonchi  Abdomen: +bowel sounds, soft, nontender, nondistended, no rebound, no guarding, no hepatosplenomegaly  Extremities: no cyanosis, clubbing, edema  Skin: Warm and dry. Medium sized patch of erythema under her right breast.  Psych: appropriate mood and affect     Ref. Range 8/17/2017 09:24   TSH Latest Ref Range: 0.300-3.700 uIU/mL 4.180 (H)     Narrative        7/31/2017 1:18 PM    HISTORY/REASON FOR EXAM: Intermittent distal esophageal pain.    TECHNIQUE/EXAM DESCRIPTION AND NUMBER OF VIEWS: Esophagram.    FINDINGS: Multiple images were obtained following oral administration of barium in a single contrast fashion. Rapid sequence views of the cervical esophagus demonstrate no evidence of stricture, web, or mass. There are surgical changes involving the   cervical spine.  The cricopharyngeus muscle relaxes normally.  The thoracic esophagus is normal in appearance. There is no evidence of stricture or mass.  There is secondary and tertiary esophageal waves with intermittent spasm of the lower esophageal sphincter.  No gastroesophageal reflux is seen during the course of the study.  There is no evidence of hiatal hernia.  The patient was " administered a 13-mm barium tablet which delayed transit across the lower esophageal sphincter.. 34 fluoroscopic images obtained. A total of 1.2 minutes fluoroscopy was used.         Impression        1.  No evidence of fixed esophageal stricture or mass.    2.  Esophageal dysmotility characterized by secondary and tertiary waves and intermittent spasm of the lower esophageal sphincter.           ASSESSMENT/PLAN:    62 y.o.female    1. Esophageal spasm- noted on recent imaging studies. She is asymptomatic. Will refer to gastroenterology for further evaluation.  REFERRAL TO GASTROENTEROLOGY   2. Hypothyroidism due to acquired atrophy of thyroid -improving. Will increase to levothyroxine 75 µg daily. Recheck labs in 6 weeks.  levothyroxine (SYNTHROID) 75 MCG Tab    TSH   3. Candidiasis, intertrigo-we'll give nystatin powder. Monitor.  nystatin (MYCOSTATIN) powder     Reviewed recommendations for mammogram, DEXA    Return in about 2 months (around 10/18/2017).    This medical record contains text that has been entered with the assistance of computer voice recognition and dictation software.  Therefore, it may contain unintended errors in text, spelling, punctuation, or grammar.

## 2017-08-18 NOTE — MR AVS SNAPSHOT
"        Alex Shepard   2017 10:20 AM   Office Visit   MRN: [id number]    Department:  [address] [place] [address]   Dept Phone:  [phone]    Description:  Female : 1955   Provider:  Elham Maria M.D.           Reason for Visit     Follow-Up Test FV      Allergies as of 2017     Allergen Noted Reactions    Penicillins 2013       As child- has a lot of pcn- uncertain about reaction.     Nicoderm [Nicotine] 2015   Rash    Rash where patch was placed.       You were diagnosed with     Esophageal spasm   [014351]       Hypothyroidism due to acquired atrophy of thyroid   [7439054]       Candidiasis, intertrigo   [165850]         Vital Signs     Blood Pressure Pulse Temperature Height Weight Body Mass Index    136/68 mmHg 88 36.1 °C (97 °F) 1.702 m (5' 7.01\") 95.165 kg (209 lb 12.8 oz) 32.85 kg/m2    Oxygen Saturation Smoking Status                95% Former Smoker          Basic Information     Date Of Birth Sex Race Ethnicity Preferred Language    1955 Female White Non- English      Your appointments     Sep 06, 2017 10:45 AM   BONE DENSITY (DEXASCAN) with S [place] BD 1   [facility]    [facility] [place]  [address]  [address]  [place] [id number] [phone]           No calcium supplements 24 hours prior to exam, including antacids or multivitamins w/calcium.  Pt may eat and drink normally.  No injection procedures prior to Dexa on the same day.  No barium contrast, no CTs with IV or oral contrast, no Pet/CTs and no Nuc Med injections for 7 days prior to a Dexa (including Barium Swallow, Upper GI, Small Bowel Series).  If scheduling a Dexa on the same day as a CT with IV or oral contrast, any test with barium, Pet/CT or a Nuc Med with injection, always schedule the Dexa before the other study.            Sep 06, 2017 11:50 AM   MA SCRN10 with S MCCARRAN MG 1   [facility]AM[facility] ([facility])    [address]  [place] [address]   [phone]           No deodorant, powder, perfume or lotion under the arm or breast area.            Oct 20, 2017 10:40 AM   Established Patient with Elham Maria M.D.   [facility] [address] ([facility])    [address]  [place] [id number] [phone]           You will be receiving a confirmation call a few days before your appointment from our automated call confirmation system.            Oct 26, 2017 11:00 AM   XRAY 15 with PULMONARY DX 1   [facility] - Pulmonary ([address])    [address]  [place] [address]               Oct 26, 2017 11:20 AM   New Patient Pulmonary with A Rotation   [facility] Pulmonary Medicine (--)    [address]  [address]  [place] [id number] [phone]              Problem List              ICD-10-CM Priority Class Noted - Resolved    Hyponatremia E87.1   3/4/2013 - Present    Depression F32.9   3/4/2013 - Present    Anxiety F41.9   3/4/2013 - Present    Smoking history Z87.891   11/19/2014 - Present    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, 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   12/17/2014 - Present    BMI 33.0-33.9,adult Z68.33   3/28/2015 - Present    PTSD (post-traumatic stress disorder) F43.10   8/22/2015 - Present    Generalized pain R52   8/22/2015 - Present    COPD (chronic obstructive pulmonary disease) (CMS-HCC) J44.9   8/22/2015 - Present    Bipolar 1 disorder (CMS-HCC) F31.9   8/22/2015 - Present    Back pain with radiation M54.9   8/26/2015 - Present    DDD (degenerative disc disease), cervical M50.30   8/31/2015 - Present    Chronic hypoxemic respiratory failure (CMS-HCC) J96.11   8/31/2015 - Present    Pulmonary nodule R91.1   9/23/2015 - Present    Asthma J45.909   9/23/2015 - Present    Abnormal chest CT R93.8   10/22/2015 - Present    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   5/17/2016 - Present    Hypothyroidism due to acquired atrophy of thyroid E03.4   11/15/2016 - Present    Elevated hemoglobin  (CMS-HCC) D58.2   6/20/2017 - Present      Health Maintenance        Date Due Completion Dates    IMM DTaP/Tdap/Td Vaccine (1 - Tdap) 5/21/1972 ---    IMM ZOSTER VACCINE 5/21/2013 ---    COLON CANCER SCREENING ANNUAL FIT 9/17/2016 9/17/2015    MAMMOGRAM 6/1/2018 (Originally 10/26/2016) 10/26/2015 (Postponed), 9/16/2013, 4/22/2008, 4/22/2008    Override on 10/26/2015: Postponed (pt elects to postpone 1 yr)    IMM INFLUENZA (1) 9/1/2017 11/15/2016, 10/22/2015, 9/29/2014    PAP SMEAR 7/20/2020 7/20/2017            Current Immunizations     Influenza TIV (IM) 9/29/2014    Influenza Vaccine Quad Inj (Pf) 11/15/2016, 10/22/2015    Pneumococcal polysaccharide vaccine (PPSV-23) 9/29/2014      Below and/or attached are the medications your provider expects you to take. Review all of your home medications and newly ordered medications with your provider and/or pharmacist. Follow medication instructions as directed by your provider and/or pharmacist. Please keep your medication list with you and share with your provider. Update the information when medications are discontinued, doses are changed, or new medications (including over-the-counter products) are added; and carry medication information at all times in the event of emergency situations     Allergies:  PENICILLINS - (reactions not documented)     NICODERM - Rash               Medications  Valid as of: August 18, 2017 - 11:04 AM    Generic Name Brand Name Tablet Size Instructions for use    Albuterol Sulfate (Aero Soln) albuterol 108 (90 Base) MCG/ACT Inhale 2 Puffs by mouth every 6 hours as needed for Shortness of Breath.        ARIPiprazole (Tab) ABILIFY 10 MG Take 10 mg by mouth every day.        Baclofen (Tab) LIORESAL 10 MG Take 1 Tab by mouth 3 times a day.        DULoxetine HCl (Cap DR Particles) CYMBALTA 30 MG 30 mg every day. calms her down with pain        Fluticasone Propionate (Suspension) FLONASE 50 MCG/ACT Spray 1 Spray in nose every day.         HydrOXYzine HCl (Tab) ATARAX 50 MG Take 50 mg by mouth 3 times a day as needed.        Ibuprofen (Tab) MOTRIN 600 MG Take 600 mg by mouth every 6 hours as needed.        Ipratropium-Albuterol (Solution) DUONEB 0.5-2.5 (3) MG/3ML 3 mL by Nebulization route every 6 hours as needed for Shortness of Breath.        Levothyroxine Sodium (Tab) SYNTHROID 50 MCG TAKE ONE TABLET BY MOUTH ONCE DAILY IN THE MORNING ON  AN  EMPTY  STOMACH        Levothyroxine Sodium (Tab) SYNTHROID 75 MCG Take 1 Tab by mouth Every morning on an empty stomach.        Nystatin (Cream) MYCOSTATIN 665431 UNIT/GM APPLY ONE GRAM TOPICALLY TO AFFECTED AREA(S) TWICE DAILY        Nystatin (Powder) MYCOSTATIN 721997 UNIT/GM Apply 1 g to affected area(s) 4 times a day.        Omeprazole (CAPSULE DELAYED RELEASE) PRILOSEC 20 MG TAKE ONE CAPSULE BY MOUTH ONCE DAILY        Omeprazole (CAPSULE DELAYED RELEASE) PRILOSEC 20 MG TAKE ONE CAPSULE BY MOUTH ONCE DAILY        Tiotropium Bromide Monohydrate (Cap) SPIRIVA 18 MCG Inhale 1 Cap by mouth every day.        TraMADol HCl (Tab) ULTRAM 50 MG Take  mg by mouth every four hours as needed.        .                 Medicines prescribed today were sent to:     [facility] [address] [place] [address], [place] - [address]    [address] [facility] [address]    Phone: [phone] Fax: [phone]    Open 24 Hours?: No      Medication refill instructions:       If your prescription bottle indicates you have medication refills left, it is not necessary to call your provider’s office. Please contact your pharmacy and they will refill your medication.    If your prescription bottle indicates you do not have any refills left, you may request refills at any time through one of the following ways: The online [employer] system (except Urgent Care), by calling your provider’s office, or by asking your pharmacy to contact your provider’s office with a refill request. Medication refills are processed only during regular  business hours and may not be available until the next business day. Your provider may request additional information or to have a follow-up visit with you prior to refilling your medication.   *Please Note: Medication refills are assigned a new Rx number when refilled electronically. Your pharmacy may indicate that no refills were authorized even though a new prescription for the same medication is available at the pharmacy. Please request the medicine by name with the pharmacy before contacting your provider for a refill.        Your To Do List     Future Labs/Procedures Complete By Expires    TSH  As directed 8/18/2018      Referral     A referral request has been sent to our patient care coordination department. Please allow 3-5 business days for us to process this request and contact you either by phone or mail. If you do not hear from us by the 5th business day, please call us at [phone].           [employer] Access Code: Activation code not generated  Current [employer] Status: Active

## 2017-09-29 ENCOUNTER — HOSPITAL ENCOUNTER (OUTPATIENT)
Dept: RADIOLOGY | Facility: MEDICAL CENTER | Age: 62
End: 2017-09-29
Attending: FAMILY MEDICINE
Payer: MEDICARE

## 2017-09-29 DIAGNOSIS — M85.88 OSTEOPENIA OF SPINE: ICD-10-CM

## 2017-09-29 DIAGNOSIS — Z78.0 POSTMENOPAUSAL: ICD-10-CM

## 2017-09-29 DIAGNOSIS — Z12.31 VISIT FOR SCREENING MAMMOGRAM: ICD-10-CM

## 2017-09-29 PROCEDURE — 77080 DXA BONE DENSITY AXIAL: CPT

## 2017-09-29 PROCEDURE — 77063 BREAST TOMOSYNTHESIS BI: CPT

## 2017-10-16 ENCOUNTER — HOSPITAL ENCOUNTER (OUTPATIENT)
Dept: LAB | Facility: MEDICAL CENTER | Age: 62
End: 2017-10-16
Attending: FAMILY MEDICINE
Payer: MEDICARE

## 2017-10-16 DIAGNOSIS — E03.4 HYPOTHYROIDISM DUE TO ACQUIRED ATROPHY OF THYROID: ICD-10-CM

## 2017-10-16 PROCEDURE — 36415 COLL VENOUS BLD VENIPUNCTURE: CPT

## 2017-10-16 PROCEDURE — 84443 ASSAY THYROID STIM HORMONE: CPT

## 2017-10-16 PROCEDURE — 83013 H PYLORI (C-13) BREATH: CPT

## 2017-10-17 VITALS
WEIGHT: 217 LBS | HEIGHT: 67 IN | TEMPERATURE: 97.7 F | RESPIRATION RATE: 16 BRPM | HEART RATE: 78 BPM | BODY MASS INDEX: 34.06 KG/M2 | SYSTOLIC BLOOD PRESSURE: 122 MMHG | DIASTOLIC BLOOD PRESSURE: 90 MMHG

## 2017-10-17 LAB
TSH SERPL DL<=0.005 MIU/L-ACNC: 2.12 UIU/ML (ref 0.3–3.7)
UREA BREATH TEST QL: NEGATIVE

## 2017-10-17 RX ORDER — BUDESONIDE AND FORMOTEROL FUMARATE DIHYDRATE 160; 4.5 UG/1; UG/1
2 AEROSOL RESPIRATORY (INHALATION)
COMMUNITY
End: 2022-07-01

## 2017-10-19 ENCOUNTER — TELEPHONE (OUTPATIENT)
Dept: MEDICAL GROUP | Age: 62
End: 2017-10-19

## 2017-10-19 NOTE — TELEPHONE ENCOUNTER
ESTABLISHED PATIENT PRE-VISIT PLANNING     Note: Patient will not be contacted if there is no indication to call.     1.  Reviewed notes from the last few office visits within the medical group: Yes    2.  If any orders were placed at last visit or intended to be done for this visit (i.e. 6 mos follow-up), do we have Results/Consult Notes?        •  Labs - Labs ordered, completed on 10/16 and results are in chart.   Note: If patient appointment is for lab review and patient did not complete labs,                check with provider if OK to reschedule patient until labs completed.       •  Imaging - Imaging ordered, completed and results are in chart.       •  Referrals - No referrals were ordered at last office visit.    3. Is this appointment scheduled as a Hospital Follow-Up? No    4.  Immunizations were updated in Epic using WebIZ?: Epic matches WebIZ       •  Web [employer] Recommendations: FLU, TDAP and ZOSTAVAX (Shingles)    5.  Patient is due for the following Health Maintenance Topics:   Health Maintenance Due   Topic Date Due   • IMM DTaP/Tdap/Td Vaccine (1 - Tdap) 05/21/1972   • IMM ZOSTER VACCINE  05/21/2013   • IMM PNEUMOCOCCAL 19-64 (ADULT) HIGHEST RISK SERIES (2 of 3 - PCV13) 09/29/2015   • PFT SCREENING-FEV1 AND FEV/FVC RATIO / SPIROMETRY SHOULD BE PERFORMED ANNUALLY  12/12/2015   • COLON CANCER SCREENING ANNUAL FIT  09/17/2016   • IMM INFLUENZA (1) 09/01/2017           6.  Patient was NOT informed to arrive 15 min prior to their scheduled appointment and bring in their medication bottles.

## 2017-10-20 ENCOUNTER — OFFICE VISIT (OUTPATIENT)
Dept: MEDICAL GROUP | Age: 62
End: 2017-10-20
Payer: MEDICARE

## 2017-10-20 VITALS
DIASTOLIC BLOOD PRESSURE: 70 MMHG | HEIGHT: 67 IN | TEMPERATURE: 98.1 F | OXYGEN SATURATION: 95 % | HEART RATE: 90 BPM | WEIGHT: 204.2 LBS | SYSTOLIC BLOOD PRESSURE: 124 MMHG | BODY MASS INDEX: 32.05 KG/M2

## 2017-10-20 DIAGNOSIS — B37.2 CANDIDAL INTERTRIGO: ICD-10-CM

## 2017-10-20 DIAGNOSIS — Z23 NEED FOR VACCINATION: ICD-10-CM

## 2017-10-20 DIAGNOSIS — L82.0 INFLAMED SEBORRHEIC KERATOSIS: ICD-10-CM

## 2017-10-20 DIAGNOSIS — M85.88 OSTEOPENIA OF SPINE: ICD-10-CM

## 2017-10-20 DIAGNOSIS — K13.0 ANGULAR CHEILITIS: ICD-10-CM

## 2017-10-20 PROCEDURE — 90670 PCV13 VACCINE IM: CPT | Performed by: FAMILY MEDICINE

## 2017-10-20 PROCEDURE — G0008 ADMIN INFLUENZA VIRUS VAC: HCPCS | Performed by: FAMILY MEDICINE

## 2017-10-20 PROCEDURE — 17110 DESTRUCTION B9 LES UP TO 14: CPT | Performed by: FAMILY MEDICINE

## 2017-10-20 PROCEDURE — 90686 IIV4 VACC NO PRSV 0.5 ML IM: CPT | Performed by: FAMILY MEDICINE

## 2017-10-20 PROCEDURE — G0009 ADMIN PNEUMOCOCCAL VACCINE: HCPCS | Performed by: FAMILY MEDICINE

## 2017-10-20 PROCEDURE — 99214 OFFICE O/P EST MOD 30 MIN: CPT | Mod: 25 | Performed by: FAMILY MEDICINE

## 2017-10-20 RX ORDER — TERBINAFINE HYDROCHLORIDE 250 MG/1
250 TABLET ORAL DAILY
Qty: 30 TAB | Refills: 0 | Status: SHIPPED | OUTPATIENT
Start: 2017-10-20 | End: 2018-10-29

## 2017-10-20 RX ORDER — FLUOROURACIL 50 MG/G
CREAM TOPICAL
Qty: 1 TUBE | Refills: 0 | Status: SHIPPED | OUTPATIENT
Start: 2017-10-20 | End: 2018-10-29

## 2017-10-20 RX ORDER — TRIAMCINOLONE ACETONIDE 1 MG/G
OINTMENT TOPICAL
Qty: 1 TUBE | Refills: 1 | Status: SHIPPED | OUTPATIENT
Start: 2017-10-20 | End: 2022-03-03 | Stop reason: SDUPTHER

## 2017-10-20 NOTE — PROGRESS NOTES
This medical record contains text that has been entered with the assistance of computer voice recognition and dictation software.  Therefore, it may contain unintended errors in text, spelling, punctuation, or grammar    Chief Complaint   Patient presents with   • Other     see reason for visit       Alex Shepard is a 62 y.o. female here evaluation and management of: rash breasts and lips, isk, osteopenia      HPI:     Candidal intertrigo  The patient is a 62-year-old female who presents to clinic with a chief complaint of irritated red itchy rash between and under breasts. She states that she has tried nystatin powder but it has not been working.          Angular cheilitis  Patient states for the past 2 weeks or so she's been having is really dry irritating rash on the corner of both sides of the mouth. She's been using Carmex but it has not improved.    Inflamed seborrheic keratosis  Patient has been complaining that these oval lesions have been irritating,bleeding when washing, flaking,and causing discomfort so is interested in removal.        Current medicines (including changes today)  Current Outpatient Prescriptions   Medication Sig Dispense Refill   • calcium-vitamin D (OSCAL 500 +D) 500-200 MG-UNIT Tab Take 1 Tab by mouth 2 times a day, with meals. 360 Tab 0   • terbinafine (LAMISIL) 250 MG Tab Take 1 Tab by mouth every day. 30 Tab 0   • triamcinolone acetonide (KENALOG) 0.1 % Ointment Aaa bid x 14 days then stop 1 Tube 1   • fluorouracil (EFUDEX) 5 % cream AAA bid x 2wks 1 Tube 0   • budesonide-formoterol (SYMBICORT) 160-4.5 MCG/ACT Aerosol Inhale 2 Puffs by mouth. Rinse mouth after each use.     • levothyroxine (SYNTHROID) 75 MCG Tab Take 1 Tab by mouth Every morning on an empty stomach. 30 Tab 3   • nystatin (MYCOSTATIN) powder Apply 1 g to affected area(s) 4 times a day. 15 g 3   • nystatin (MYCOSTATIN) 866504 UNIT/GM Cream topical cream APPLY ONE GRAM TOPICALLY TO AFFECTED AREA(S) TWICE DAILY 15  Tube 0   • ipratropium-albuterol (DUONEB) 0.5-2.5 (3) MG/3ML nebulizer solution 3 mL by Nebulization route every 6 hours as needed for Shortness of Breath. 30 Bullet 3   • albuterol 108 (90 BASE) MCG/ACT Aero Soln inhalation aerosol Inhale 2 Puffs by mouth every 6 hours as needed for Shortness of Breath. 8.5 g 3   • omeprazole (PRILOSEC) 20 MG delayed-release capsule TAKE ONE CAPSULE BY MOUTH ONCE DAILY 30 Cap 3   • hydrOXYzine (ATARAX) 50 MG Tab Take 50 mg by mouth 3 times a day as needed.     • duloxetine (CYMBALTA) 30 MG Cap DR Particles 30 mg every day. calms her down with pain     • aripiprazole (ABILIFY) 10 MG Tab Take 10 mg by mouth every day.     • baclofen (LIORESAL) 10 MG Tab Take 1 Tab by mouth 3 times a day. 60 Tab 1   • omeprazole (PRILOSEC) 20 MG delayed-release capsule TAKE ONE CAPSULE BY MOUTH ONCE DAILY 30 Cap 0   • levothyroxine (SYNTHROID) 50 MCG Tab TAKE ONE TABLET BY MOUTH ONCE DAILY IN THE MORNING ON  AN  EMPTY  STOMACH (Patient not taking: Reported on 10/20/2017) 30 Tab 3   • ibuprofen (MOTRIN) 600 MG Tab Take 600 mg by mouth every 6 hours as needed.     • fluticasone (FLONASE) 50 MCG/ACT nasal spray Spray 1 Spray in nose every day.     • tiotropium (SPIRIVA) 18 MCG Cap Inhale 1 Cap by mouth every day. 30 Cap 3   • tramadol (ULTRAM) 50 MG Tab Take  mg by mouth every four hours as needed.       No current facility-administered medications for this visit.      She  has a past medical history of Anxiety; ASTHMA; Back pain; Bipolar affect, depressed ([facility]); Bronchitis; Cholelithiasis; Chronic airway obstruction, not elsewhere classified; Cigarette smoker one half pack a day or less; Depression; H/O cervical spine surgery (2002 / 2005); Hyponatremia; Pneumonia; Psychiatric disorder (2005); Psychosis; PTSD (post-traumatic stress disorder); Pulmonary nodule; S/P appendectomy (1968); and S/P repair of patent ductus arteriosus (1958).  She  has a past surgical history that includes other  "cardiac surgery; other (, ); other; appendectomy; and cristofer by laparoscopy (Bilateral, 2016).  Social History   Substance Use Topics   • Smoking status: Former Smoker     Packs/day: 0.50     Years: 15.00     Types: Cigarettes     Start date: 1999     Quit date: 2015   • Smokeless tobacco: Never Used   • Alcohol use No      Comment: occas glass of wine, Agrees to stop use     Social History     Social History Narrative    , 2 children.      Family History   Problem Relation Age of Onset   • Psychiatry Mother      depression   • Other Mother      TIA   • Diabetes Mother    • Psychiatry Sister      depression   • Cancer Father      prostate;skin     Family Status   Relation Status   • Mother  at age 91   • Sister    • Father  at age 86         ROS    Please see hpi     All other systems reviewed and are negative     Objective:     Blood pressure 124/70, pulse 90, temperature 36.7 °C (98.1 °F), height 1.702 m (5' 7.01\"), weight 92.6 kg (204 lb 3.2 oz), SpO2 95 %. Body mass index is 31.97 kg/m².  Physical Exam:    Constitutional: Alert, no distress.  Skin: Warm, dry, good turgor, no rashes in visible areas.    Inframammary erythema with satellite lesions under breasts    Eye: Equal, round and reactive, conjunctiva clear, lids normal.  ENMT: Lips without lesions, good dentition, oropharynx clear.  Neck: Trachea midline, no masses, no thyromegaly. No cervical or supraclavicular lymphadenopathy.  Respiratory: Unlabored respiratory effort, lungs clear to auscultation, no wheezes, no ronchi.  Cardiovascular: Normal S1, S2, no murmur, no edema.  Abdomen: Soft, non-tender, no masses, no hepatosplenomegaly.  Psych: Alert and oriented x3, normal affect and mood.          Assessment and Plan:   The following treatment plan was discussed      1. Osteopenia of spine  We reviewed osteopenia today, I recommended that we repeat her  DEXA test in 2 years. The following strategies will " help you maintain your bone density: exercise regularly, keep a healthy body weight, and consume 1000 IU vitamin D3 daily (or as directed if you have Vitamin D deficiency.)      - calcium-vitamin D (OSCAL 500 +D) 500-200 MG-UNIT Tab; Take 1 Tab by mouth 2 times a day, with meals.  Dispense: 360 Tab; Refill: 0    2. Need for vaccination  Given today    - INFLUENZA VACCINE QUAD INJ >3Y(PF)  - PNEUMOCOCCAL CONJUGATE VACCINE 13-VALENT    3. Candidal intertrigo  Skin:     Noted inflammatory erythema, skin breakdown, and satellite papules and pustules        This patients is at risk for recurrence should use a drying agent indefinitely.  Use drying agents including antifungal powders (eg. Nystatin, miconazole, undecylenic acid, and tolnaftat)  Oral Lamisil 250 mg daily x 30 days, stop if resolution precedes 30 days  Keep area air dried  Intermittent (eg, twice-weekly) use of topical antifungal is sometimes used in an attempt to decrease the likelihood of recurrent candidal infection.   Improve obesity, incontinence and DM if present        - terbinafine (LAMISIL) 250 MG Tab; Take 1 Tab by mouth every day.  Dispense: 30 Tab; Refill: 0  - triamcinolone acetonide (KENALOG) 0.1 % Ointment; Aaa bid x 14 days then stop  Dispense: 1 Tube; Refill: 1    4. Angular cheilitis    - fluorouracil (EFUDEX) 5 % cream; AAA bid x 2wks  Dispense: 1 Tube; Refill: 0    5. Inflamed seborrheic keratosis  Patient tollerrated procedure well  There were no adverse events  Patient was given post procedure precautions           HEALTH MAINTENANCE:    Instructed to Follow up in clinic or ER for worsening symptoms, difficulty breathing, lack of expected recovery, or should new symptoms or problems arise.    Followup: Return in about 2 weeks (around 11/3/2017) for Reevaluation.       Once again this medical record contains text that has been entered with the assistance of computer voice recognition and dictation software.  Therefore, it may contain  unintended errors in text, spelling, punctuation, or grammar

## 2017-10-20 NOTE — ASSESSMENT & PLAN NOTE
Patient states for the past 2 weeks or so she's been having is really dry irritating rash on the corner of both sides of the mouth. She's been using Carmex but it has not improved.

## 2017-10-20 NOTE — ASSESSMENT & PLAN NOTE
The patient is a 62-year-old female who presents to clinic with a chief complaint of irritated red itchy rash between and under breasts. She states that she has tried nystatin powder but it has not been working.

## 2017-10-23 RX ORDER — NYSTATIN 100000 U/G
CREAM TOPICAL
Qty: 1 TUBE | Refills: 0 | Status: SHIPPED | OUTPATIENT
Start: 2017-10-23 | End: 2018-10-29

## 2017-10-23 RX ORDER — OMEPRAZOLE 20 MG/1
CAPSULE, DELAYED RELEASE ORAL
Qty: 30 CAP | Refills: 0 | Status: SHIPPED | OUTPATIENT
Start: 2017-10-23 | End: 2018-10-12

## 2017-10-24 ENCOUNTER — TELEPHONE (OUTPATIENT)
Dept: MEDICAL GROUP | Age: 62
End: 2017-10-24

## 2017-10-24 NOTE — TELEPHONE ENCOUNTER
Phone Number Called: [phone] (home)     Message: Left message for the patient to call us back regarding the note below.    Left Message for patient to call back: yes

## 2017-10-24 NOTE — TELEPHONE ENCOUNTER
----- Message from Elham Maria M.D. sent at 10/23/2017  3:34 PM PDT -----  Please let pt know her h pylori breath test was negative.

## 2017-10-25 NOTE — TELEPHONE ENCOUNTER
Phone Number Called: [phone] (home)     Message: Pt informed of her results.      Left Message for patient to call back: no

## 2017-11-03 ENCOUNTER — APPOINTMENT (OUTPATIENT)
Dept: PULMONOLOGY | Facility: HOSPICE | Age: 62
End: 2017-11-03
Payer: MEDICARE

## 2017-11-10 ENCOUNTER — OFFICE VISIT (OUTPATIENT)
Dept: MEDICAL GROUP | Age: 62
End: 2017-11-10
Payer: MEDICARE

## 2017-11-10 VITALS
HEIGHT: 67 IN | BODY MASS INDEX: 32.02 KG/M2 | OXYGEN SATURATION: 99 % | TEMPERATURE: 97.7 F | WEIGHT: 204 LBS | HEART RATE: 90 BPM | SYSTOLIC BLOOD PRESSURE: 120 MMHG | DIASTOLIC BLOOD PRESSURE: 74 MMHG

## 2017-11-10 DIAGNOSIS — L57.0 AK (ACTINIC KERATOSIS): ICD-10-CM

## 2017-11-10 DIAGNOSIS — B37.2 CANDIDAL INTERTRIGO: ICD-10-CM

## 2017-11-10 DIAGNOSIS — E66.9 OBESITY (BMI 30-39.9): ICD-10-CM

## 2017-11-10 DIAGNOSIS — M85.88 OSTEOPENIA OF SPINE: ICD-10-CM

## 2017-11-10 DIAGNOSIS — L82.0 INFLAMED SEBORRHEIC KERATOSIS: ICD-10-CM

## 2017-11-10 PROCEDURE — 99214 OFFICE O/P EST MOD 30 MIN: CPT | Mod: 25 | Performed by: FAMILY MEDICINE

## 2017-11-10 PROCEDURE — 17110 DESTRUCTION B9 LES UP TO 14: CPT | Performed by: FAMILY MEDICINE

## 2017-11-10 PROCEDURE — 17000 DESTRUCT PREMALG LESION: CPT | Mod: 59 | Performed by: FAMILY MEDICINE

## 2017-11-10 PROCEDURE — 17003 DESTRUCT PREMALG LES 2-14: CPT | Performed by: FAMILY MEDICINE

## 2017-11-10 NOTE — ASSESSMENT & PLAN NOTE
Overall there has been significant improvement with topical nystatin powder, and oral Lamisil. She knows she is to keep these areas that are prone to reinfection dry.

## 2017-11-10 NOTE — ASSESSMENT & PLAN NOTE
Patient states that the pharmacy did not have calcium/vitamin D supplementation 500/200 mg    She will look at other pharmacies. She understands  that she is to do weightbearing exercise such as walking or running

## 2017-11-10 NOTE — ASSESSMENT & PLAN NOTE
Patient states that the previous session of cryotherapy worked very well, patient  has several more irritating lesions that they would like to have destroyed.

## 2017-11-10 NOTE — PROGRESS NOTES
This medical record contains text that has been entered with the assistance of computer voice recognition and dictation software.  Therefore, it may contain unintended errors in text, spelling, punctuation, or grammar    Chief Complaint   Patient presents with   • Other     see reason for visit       Alex Shepard is a 62 y.o. female here evaluation and management of: candidal intertrigo, osteopenia, isk, ak      HPI:     Inflamed seborrheic keratosis  Patient states that the previous session of cryotherapy worked very well, patient  has several more irritating lesions that they would like to have destroyed.    Osteopenia of spine  Patient states that the pharmacy did not have calcium/vitamin D supplementation 500/200 mg    She will look at other pharmacies. She understands  that she is to do weightbearing exercise such as walking or running    Candidal intertrigo  Overall there has been significant improvement with topical nystatin powder, and oral Lamisil. She knows she is to keep these areas that are prone to reinfection dry.    Current medicines (including changes today)  Current Outpatient Prescriptions   Medication Sig Dispense Refill   • calcium-vitamin D (OSCAL-500) 500-125 MG-UNIT Tab Take 1 Tab by mouth every day. 360 Tab 1   • omeprazole (PRILOSEC) 20 MG delayed-release capsule TAKE ONE CAPSULE BY MOUTH ONCE DAILY 30 Cap 0   • terbinafine (LAMISIL) 250 MG Tab Take 1 Tab by mouth every day. 30 Tab 0   • triamcinolone acetonide (KENALOG) 0.1 % Ointment Aaa bid x 14 days then stop 1 Tube 1   • budesonide-formoterol (SYMBICORT) 160-4.5 MCG/ACT Aerosol Inhale 2 Puffs by mouth. Rinse mouth after each use.     • levothyroxine (SYNTHROID) 75 MCG Tab Take 1 Tab by mouth Every morning on an empty stomach. 30 Tab 3   • nystatin (MYCOSTATIN) powder Apply 1 g to affected area(s) 4 times a day. 15 g 3   • albuterol 108 (90 BASE) MCG/ACT Aero Soln inhalation aerosol Inhale 2 Puffs by mouth every 6 hours as needed for  Shortness of Breath. 8.5 g 3   • omeprazole (PRILOSEC) 20 MG delayed-release capsule TAKE ONE CAPSULE BY MOUTH ONCE DAILY 30 Cap 3   • hydrOXYzine (ATARAX) 50 MG Tab Take 50 mg by mouth 3 times a day as needed.     • duloxetine (CYMBALTA) 30 MG Cap DR Particles 30 mg every day. calms her down with pain     • aripiprazole (ABILIFY) 10 MG Tab Take 10 mg by mouth every day.     • baclofen (LIORESAL) 10 MG Tab Take 1 Tab by mouth 3 times a day. 60 Tab 1   • nystatin (MYCOSTATIN) 274630 UNIT/GM Cream topical cream APPLY ONE GRAM TOPICALLY TO AFFECTED AREA(S) TWICE DAILY 1 Tube 0   • calcium-vitamin D (OSCAL 500 +D) 500-200 MG-UNIT Tab Take 1 Tab by mouth 2 times a day, with meals. 360 Tab 0   • fluorouracil (EFUDEX) 5 % cream AAA bid x 2wks 1 Tube 0   • ipratropium-albuterol (DUONEB) 0.5-2.5 (3) MG/3ML nebulizer solution 3 mL by Nebulization route every 6 hours as needed for Shortness of Breath. 30 Bullet 3   • levothyroxine (SYNTHROID) 50 MCG Tab TAKE ONE TABLET BY MOUTH ONCE DAILY IN THE MORNING ON  AN  EMPTY  STOMACH (Patient not taking: Reported on 10/20/2017) 30 Tab 3   • ibuprofen (MOTRIN) 600 MG Tab Take 600 mg by mouth every 6 hours as needed.     • fluticasone (FLONASE) 50 MCG/ACT nasal spray Spray 1 Spray in nose every day.     • tiotropium (SPIRIVA) 18 MCG Cap Inhale 1 Cap by mouth every day. 30 Cap 3   • tramadol (ULTRAM) 50 MG Tab Take  mg by mouth every four hours as needed.       No current facility-administered medications for this visit.      She  has a past medical history of Anxiety; ASTHMA; Back pain; Bipolar affect, depressed (CMS-HCC); Bronchitis; Cholelithiasis; Chronic airway obstruction, not elsewhere classified; Cigarette smoker one half pack a day or less; Depression; H/O cervical spine surgery (2002 / 2005); Hyponatremia; Pneumonia; Psychiatric disorder (2005); Psychosis; PTSD (post-traumatic stress disorder); Pulmonary nodule; S/P appendectomy (1968); and S/P repair of patent ductus  "arteriosus (1958).  She  has a past surgical history that includes other cardiac surgery; other (, ); other; appendectomy; and cristofer by laparoscopy (Bilateral, 2016).  Social History   Substance Use Topics   • Smoking status: Former Smoker     Packs/day: 0.50     Years: 15.00     Types: Cigarettes     Start date: 1999     Quit date: 2015   • Smokeless tobacco: Never Used   • Alcohol use No      Comment: occas glass of wine, Agrees to stop use     Social History     Social History Narrative    , 2 children.      Family History   Problem Relation Age of Onset   • Psychiatry Mother      depression   • Other Mother      TIA   • Diabetes Mother    • Psychiatry Sister      depression   • Cancer Father      prostate;skin     Family Status   Relation Status   • Mother  at age 91   • Sister    • Father  at age 86         ROS    Please see hpi     All other systems reviewed and are negative     Objective:     Blood pressure 120/74, pulse 90, temperature 36.5 °C (97.7 °F), height 1.702 m (5' 7.01\"), weight 92.5 kg (204 lb), SpO2 99 %. Body mass index is 31.94 kg/m².  Physical Exam:    Constitutional: Alert, no distress.  Skin: Warm, dry, good turgor, no rashes in visible areas.  Eye: Equal, round and reactive, conjunctiva clear, lids normal.  ENMT: Lips without lesions, good dentition, oropharynx clear.  Neck: Trachea midline, no masses, no thyromegaly. No cervical or supraclavicular lymphadenopathy.  Respiratory: Unlabored respiratory effort, lungs clear to auscultation, no wheezes, no ronchi.  Cardiovascular: Normal S1, S2, no murmur, no edema.  Abdomen: Soft, non-tender, no masses, no hepatosplenomegaly.  Psych: Alert and oriented x3, normal affect and mood.      SKIN EXAM  Several well-demarcated, round/oval lesions with a dull, verrucous surface and irregular stuck-on appearance on back and chest      multiple lesions on bilateral forearms, hands, and chest, with " evidence of of solar damage present , spotty hyperpigmentation, scattered telangiectasias, and  Xerosis      PROCEDURE: CRYOTHERAPY  Discussed risks and benefits of cryotherapy including but not limited to scarring, hyperpigmentation, hypopigmentation, hypertrophic scarring, keiloid scarring, incomplete or no resolution of lesions treated,pain, undesirable cosemetic result, blistering, potential need for additional treatment including more invasive treatment. Patient expresses understanding and verbally acknowledges risks and consent to treatment. 2  applications of cryotherapy with 3 second freeze thaw cycle was applied to  5 AK's and  all SK's. Patient tolerated procedure well. There were no complications. Aftercare instructions given.              Assessment and Plan:   The following treatment plan was discussed      1. Inflamed seborrheic keratosis  Patient tollerrated procedure well  There were no adverse events  Patient was given post procedure precautions       2. Osteopenia of spine    The following strategies to maitain bone density were disucussed: exercise regularly, keep a healthy body weight, and consume 1000 IU vitamin D3 daily (or as directed if you have Vitamin D deficiency.)        3. Candidal intertrigo  Skin:     Noted Inframammary  erythema, skin breakdown, and satellite papules and pustules            This patients is at risk for recurrence should use a drying agent indefinitely.  Use drying agents including antifungal powders (eg. Nystatin, miconazole, undecylenic acid, and tolnaftat)  Oral Lamisil 250 mg daily x 30 days, stop if resolution precedes 30 days  Keep area air dried  Intermittent (eg, twice-weekly) use of topical antifungal is sometimes used in an attempt to decrease the likelihood of recurrent candidal infection.   Improve obesity, incontinence and DM if present          4. Obesity (BMI 30-39.9)    We discussed subsequent randomized trials, weight loss (via lifestyle or  pharmacologic therapy) has been shown to reduce morbidity (reduction in risk factors for cardiovascular disease     The frequently cited Diabetes Prevention Program (DPP) significantly reduced the rate of progression from impaired glucose tolerance to diabetes over a three-year period in participants randomized to intensive lifestyle modification focusing on weight loss       We also discussed agressive lifestyle modifications with weight loss, aerobic exercise, and eating a prudent diet, which  included avoiding fried foods, using low-fat milk and avoiding simple carbohydrate, making a food diary. If you can't run because of pain do the stationary bike/elipticle or swim 30minutes 3 times per wk, in the beginning and then gradually increase.    - Patient identified as having weight management issue.  Appropriate orders and counseling given.    5. AK  Patient tollerrated procedure well  There were no adverse events  Patient was given post procedure precautions         Over 40 minutes spent with patient face to face, greater than 50% time spent with plan/cordination of care as above in my A/P.      HEALTH MAINTENANCE:    Instructed to Follow up in clinic or ER for worsening symptoms, difficulty breathing, lack of expected recovery, or should new symptoms or problems arise.    Followup: Return in about 2 weeks (around 11/24/2017) for punch biopsy, Long.       Once again this medical record contains text that has been entered with the assistance of computer voice recognition and dictation software.  Therefore, it may contain unintended errors in text, spelling, punctuation, or grammar

## 2017-12-13 ENCOUNTER — HOSPITAL ENCOUNTER (OUTPATIENT)
Facility: MEDICAL CENTER | Age: 62
End: 2017-12-13
Attending: FAMILY MEDICINE
Payer: MEDICARE

## 2017-12-13 ENCOUNTER — OFFICE VISIT (OUTPATIENT)
Dept: MEDICAL GROUP | Age: 62
End: 2017-12-13
Payer: MEDICARE

## 2017-12-13 VITALS
WEIGHT: 206 LBS | DIASTOLIC BLOOD PRESSURE: 80 MMHG | BODY MASS INDEX: 32.33 KG/M2 | OXYGEN SATURATION: 96 % | HEART RATE: 70 BPM | TEMPERATURE: 97.7 F | HEIGHT: 67 IN | SYSTOLIC BLOOD PRESSURE: 124 MMHG

## 2017-12-13 DIAGNOSIS — L82.0 INFLAMED SEBORRHEIC KERATOSIS: ICD-10-CM

## 2017-12-13 DIAGNOSIS — D48.9 NEOPLASM OF UNCERTAIN BEHAVIOR: ICD-10-CM

## 2017-12-13 DIAGNOSIS — E03.4 HYPOTHYROIDISM DUE TO ACQUIRED ATROPHY OF THYROID: ICD-10-CM

## 2017-12-13 DIAGNOSIS — B37.2 CANDIDIASIS, INTERTRIGO: ICD-10-CM

## 2017-12-13 PROCEDURE — 11401 EXC TR-EXT B9+MARG 0.6-1 CM: CPT | Mod: 59 | Performed by: FAMILY MEDICINE

## 2017-12-13 PROCEDURE — 12031 INTMD RPR S/A/T/EXT 2.5 CM/<: CPT | Mod: 59 | Performed by: FAMILY MEDICINE

## 2017-12-13 PROCEDURE — 88305 TISSUE EXAM BY PATHOLOGIST: CPT

## 2017-12-13 PROCEDURE — 17110 DESTRUCTION B9 LES UP TO 14: CPT | Performed by: FAMILY MEDICINE

## 2017-12-13 RX ORDER — NYSTATIN 100000 [USP'U]/G
1 POWDER TOPICAL 4 TIMES DAILY
Qty: 15 G | Refills: 3 | Status: SHIPPED | OUTPATIENT
Start: 2017-12-13 | End: 2018-10-12 | Stop reason: SDUPTHER

## 2017-12-13 RX ORDER — LEVOTHYROXINE SODIUM 0.07 MG/1
75 TABLET ORAL
Qty: 90 TAB | Refills: 2 | Status: SHIPPED | OUTPATIENT
Start: 2017-12-13 | End: 2018-09-18 | Stop reason: SDUPTHER

## 2017-12-13 NOTE — ASSESSMENT & PLAN NOTE
We  noticed a  new, growing,hyperpigmented, irregular,  macule on  routine skin exam.  The lesion has been bothering/irritating the patient, and she is concerned

## 2017-12-13 NOTE — PROGRESS NOTES
This medical record contains text that has been entered with the assistance of computer voice recognition and dictation software.  Therefore, it may contain unintended errors in text, spelling, punctuation, or grammar    Chief Complaint   Patient presents with   • Biopsy     mole removal       Alex Shepard is a 62 y.o. female here evaluation and management of: nub      HPI:     Neoplasm of uncertain behavior  We  noticed a  new, growing,hyperpigmented, irregular,  macule on  routine skin exam.  The lesion has been bothering/irritating the patient, and she is concerned              Inflamed seborrheic keratosis  Patient states that the previous session of cryotherapy worked very well, patient  has several more irritating lesions that they would like to have destroyed.    Current medicines (including changes today)  Current Outpatient Prescriptions   Medication Sig Dispense Refill   • nystatin (MYCOSTATIN) powder Apply 1 g to affected area(s) 4 times a day. 15 g 3   • levothyroxine (SYNTHROID) 75 MCG Tab Take 1 Tab by mouth Every morning on an empty stomach. 90 Tab 2   • omeprazole (PRILOSEC) 20 MG delayed-release capsule TAKE ONE CAPSULE BY MOUTH ONCE DAILY 30 Cap 0   • triamcinolone acetonide (KENALOG) 0.1 % Ointment Aaa bid x 14 days then stop 1 Tube 1   • budesonide-formoterol (SYMBICORT) 160-4.5 MCG/ACT Aerosol Inhale 2 Puffs by mouth. Rinse mouth after each use.     • ipratropium-albuterol (DUONEB) 0.5-2.5 (3) MG/3ML nebulizer solution 3 mL by Nebulization route every 6 hours as needed for Shortness of Breath. 30 Bullet 3   • albuterol 108 (90 BASE) MCG/ACT Aero Soln inhalation aerosol Inhale 2 Puffs by mouth every 6 hours as needed for Shortness of Breath. 8.5 g 3   • omeprazole (PRILOSEC) 20 MG delayed-release capsule TAKE ONE CAPSULE BY MOUTH ONCE DAILY 30 Cap 3   • hydrOXYzine (ATARAX) 50 MG Tab Take 50 mg by mouth 3 times a day as needed.     • duloxetine (CYMBALTA) 30 MG Cap DR Particles 30 mg every  day. calms her down with pain     • aripiprazole (ABILIFY) 10 MG Tab Take 10 mg by mouth every day.     • baclofen (LIORESAL) 10 MG Tab Take 1 Tab by mouth 3 times a day. 60 Tab 1   • calcium-vitamin D (OSCAL-500) 500-125 MG-UNIT Tab Take 1 Tab by mouth every day. 360 Tab 1   • nystatin (MYCOSTATIN) 268149 UNIT/GM Cream topical cream APPLY ONE GRAM TOPICALLY TO AFFECTED AREA(S) TWICE DAILY 1 Tube 0   • calcium-vitamin D (OSCAL 500 +D) 500-200 MG-UNIT Tab Take 1 Tab by mouth 2 times a day, with meals. 360 Tab 0   • terbinafine (LAMISIL) 250 MG Tab Take 1 Tab by mouth every day. 30 Tab 0   • fluorouracil (EFUDEX) 5 % cream AAA bid x 2wks 1 Tube 0   • levothyroxine (SYNTHROID) 50 MCG Tab TAKE ONE TABLET BY MOUTH ONCE DAILY IN THE MORNING ON  AN  EMPTY  STOMACH (Patient not taking: Reported on 10/20/2017) 30 Tab 3   • ibuprofen (MOTRIN) 600 MG Tab Take 600 mg by mouth every 6 hours as needed.     • fluticasone (FLONASE) 50 MCG/ACT nasal spray Spray 1 Spray in nose every day.     • tiotropium (SPIRIVA) 18 MCG Cap Inhale 1 Cap by mouth every day. 30 Cap 3   • tramadol (ULTRAM) 50 MG Tab Take  mg by mouth every four hours as needed.       No current facility-administered medications for this visit.      She  has a past medical history of Anxiety; ASTHMA; Back pain; Bipolar affect, depressed (CMS-HCC); Bronchitis; Cholelithiasis; Chronic airway obstruction, not elsewhere classified; Cigarette smoker one half pack a day or less; Depression; H/O cervical spine surgery (2002 / 2005); Hyponatremia; Pneumonia; Psychiatric disorder (2005); Psychosis; PTSD (post-traumatic stress disorder); Pulmonary nodule; S/P appendectomy (1968); and S/P repair of patent ductus arteriosus (1958).  She  has a past surgical history that includes other cardiac surgery; other (2002, 2005); other; appendectomy; and cristofer by laparoscopy (Bilateral, 5/21/2016).  Social History   Substance Use Topics   • Smoking status: Former Smoker      "Packs/day: 0.50     Years: 15.00     Types: Cigarettes     Start date: 1999     Quit date: 2015   • Smokeless tobacco: Never Used   • Alcohol use No      Comment: occas glass of wine, Agrees to stop use     Social History     Social History Narrative    , 2 children.      Family History   Problem Relation Age of Onset   • Psychiatry Mother      depression   • Other Mother      TIA   • Diabetes Mother    • Psychiatry Sister      depression   • Cancer Father      prostate;skin     Family Status   Relation Status   • Mother  at age 91   • Sister    • Father  at age 86         ROS    Please see hpi     All other systems reviewed and are negative     Objective:     Blood pressure 124/80, pulse 70, temperature 36.5 °C (97.7 °F), height 1.702 m (5' 7.01\"), weight 93.4 kg (206 lb), SpO2 96 %. Body mass index is 32.26 kg/m².  Physical Exam:    Constitutional: Alert, no distress.  Skin: Warm, dry, good turgor, no rashes in visible areas.  Eye: Equal, round and reactive, conjunctiva clear, lids normal.  ENMT: Lips without lesions, good dentition, oropharynx clear.  Neck: Trachea midline, no masses, no thyromegaly. No cervical or supraclavicular lymphadenopathy.  Respiratory: Unlabored respiratory effort, lungs clear to auscultation, no wheezes, no ronchi.  Cardiovascular: Normal S1, S2, no murmur, no edema.  Abdomen: Soft, non-tender, no masses, no hepatosplenomegaly.  Psych: Alert and oriented x3, normal affect and mood.      Excision---7 mm, irregular,  hyperpigmented macule located on left upper quadrant/under breast    Dermoscopy revealed bule white veil    Final Length of Excision--1cm    After informed written consent was obtained, using Betadine for cleansing and 1% Lidocaine w- epinephrine for anesthetic, with sterile technique a 6 mm punch tool was used to obtain and excise  the lesion through dermis (full thickness) . Intent was to remove entire lesion with margins . Hemostasis " was obtained by pressure and wound was  Sutured  With 3.0 Ethilon x2 Plus Vycryl x1. ( one in intermediate layer closure) Antibiotic dressing is applied, and wound care instructions provided. Be alert for any signs of cutaneous infection. The specimen is labeled and sent to pathology for evaluation. The procedure was well tolerated without complications.    Intermediate layer closure    The needle was inserted in the dermis and directed toward the skin surface, exiting near the dermal-epidermal junction on the same side.   then the needle was inserted on the opposite side of the wound near the dermal-epidermal junction, directly across from the point of exit.  The suture loop was completed in the dermis, directly opposite the origin of the loop, and the knot tied.      SKIN EXAM  Several well-demarcated, round/oval lesions with a dull, verrucous surface and irregular stuck-on appearance on back and chest        PROCEDURE: CRYOTHERAPY  Discussed risks and benefits of cryotherapy including but not limited to scarring, hyperpigmentation, hypopigmentation, hypertrophic scarring, keiloid scarring, incomplete or no resolution of lesions treated,pain, undesirable cosemetic result, blistering, potential need for additional treatment including more invasive treatment. Patient expresses understanding and verbally acknowledges risks and consent to treatment. 2  applications of cryotherapy with 3 second freeze thaw cycle was applied to    all SK's. Patient tolerated procedure well. There were no complications. Aftercare instructions given.      .            Assessment and Plan:   The following treatment plan was discussed      1. Candidiasis, intertrigo    - nystatin (MYCOSTATIN) powder; Apply 1 g to affected area(s) 4 times a day.  Dispense: 15 g; Refill: 3    2. Hypothyroidism due to acquired atrophy of thyroid    - levothyroxine (SYNTHROID) 75 MCG Tab; Take 1 Tab by mouth Every morning on an empty stomach.  Dispense: 90 Tab;  Refill: 2    3. Neoplasm of uncertain behavior  Patient tollerrated procedure well  There were no adverse events  Patient was given post procedure precautions     - PATHOLOGY SPECIMEN; Future    4. ISK  Patient tollerrated procedure well  There were no adverse events  Patient was given post procedure precautions         HEALTH MAINTENANCE:    Instructed to Follow up in clinic or ER for worsening symptoms, difficulty breathing, lack of expected recovery, or should new symptoms or problems arise.    Followup: Return in about 2 weeks (around 12/27/2017) for Suture Removal.       Once again this medical record contains text that has been entered with the assistance of computer voice recognition and dictation software.  Therefore, it may contain unintended errors in text, spelling, punctuation, or grammar

## 2017-12-26 ENCOUNTER — TELEPHONE (OUTPATIENT)
Dept: MEDICAL GROUP | Age: 62
End: 2017-12-26

## 2017-12-27 ENCOUNTER — OFFICE VISIT (OUTPATIENT)
Dept: MEDICAL GROUP | Age: 62
End: 2017-12-27
Payer: MEDICARE

## 2017-12-27 VITALS
TEMPERATURE: 98.8 F | HEART RATE: 86 BPM | WEIGHT: 208 LBS | DIASTOLIC BLOOD PRESSURE: 70 MMHG | SYSTOLIC BLOOD PRESSURE: 124 MMHG | BODY MASS INDEX: 32.65 KG/M2 | OXYGEN SATURATION: 96 % | HEIGHT: 67 IN

## 2017-12-27 DIAGNOSIS — Z48.02 VISIT FOR SUTURE REMOVAL: ICD-10-CM

## 2017-12-27 DIAGNOSIS — L82.0 INFLAMED SEBORRHEIC KERATOSIS: ICD-10-CM

## 2017-12-27 DIAGNOSIS — L57.0 AK (ACTINIC KERATOSIS): ICD-10-CM

## 2017-12-27 PROCEDURE — 17000 DESTRUCT PREMALG LESION: CPT | Mod: 59 | Performed by: FAMILY MEDICINE

## 2017-12-27 PROCEDURE — 99212 OFFICE O/P EST SF 10 MIN: CPT | Mod: 25 | Performed by: FAMILY MEDICINE

## 2017-12-27 PROCEDURE — 17110 DESTRUCTION B9 LES UP TO 14: CPT | Performed by: FAMILY MEDICINE

## 2017-12-27 NOTE — PROGRESS NOTES
This medical record contains text that has been entered with the assistance of computer voice recognition and dictation software.  Therefore, it may contain unintended errors in text, spelling, punctuation, or grammar    Chief Complaint   Patient presents with   • Establish Care       Alex Shepard is a 62 y.o. female here evaluation and management of: suture removal      HPI:     Inflamed seborrheic keratosis  Patient states that the previous session of cryotherapy worked very well, patient  has several more irritating lesions that they would like to have destroyed.    Visit for suture removal    The patient underwent excision and returns for suture removal.              Current medicines (including changes today)  Current Outpatient Prescriptions   Medication Sig Dispense Refill   • omeprazole (PRILOSEC) 20 MG delayed-release capsule TAKE ONE CAPSULE BY MOUTH ONCE DAILY 30 Cap 0   • calcium-vitamin D (OSCAL 500 +D) 500-200 MG-UNIT Tab Take 1 Tab by mouth 2 times a day, with meals. 360 Tab 0   • terbinafine (LAMISIL) 250 MG Tab Take 1 Tab by mouth every day. 30 Tab 0   • triamcinolone acetonide (KENALOG) 0.1 % Ointment Aaa bid x 14 days then stop 1 Tube 1   • budesonide-formoterol (SYMBICORT) 160-4.5 MCG/ACT Aerosol Inhale 2 Puffs by mouth. Rinse mouth after each use.     • ipratropium-albuterol (DUONEB) 0.5-2.5 (3) MG/3ML nebulizer solution 3 mL by Nebulization route every 6 hours as needed for Shortness of Breath. 30 Bullet 3   • albuterol 108 (90 BASE) MCG/ACT Aero Soln inhalation aerosol Inhale 2 Puffs by mouth every 6 hours as needed for Shortness of Breath. 8.5 g 3   • omeprazole (PRILOSEC) 20 MG delayed-release capsule TAKE ONE CAPSULE BY MOUTH ONCE DAILY 30 Cap 3   • hydrOXYzine (ATARAX) 50 MG Tab Take 50 mg by mouth 3 times a day as needed.     • duloxetine (CYMBALTA) 30 MG Cap DR Particles 30 mg every day. calms her down with pain     • aripiprazole (ABILIFY) 10 MG Tab Take 10 mg by mouth every day.      • baclofen (LIORESAL) 10 MG Tab Take 1 Tab by mouth 3 times a day. 60 Tab 1   • nystatin (MYCOSTATIN) powder Apply 1 g to affected area(s) 4 times a day. 15 g 3   • levothyroxine (SYNTHROID) 75 MCG Tab Take 1 Tab by mouth Every morning on an empty stomach. 90 Tab 2   • calcium-vitamin D (OSCAL-500) 500-125 MG-UNIT Tab Take 1 Tab by mouth every day. 360 Tab 1   • nystatin (MYCOSTATIN) 903074 UNIT/GM Cream topical cream APPLY ONE GRAM TOPICALLY TO AFFECTED AREA(S) TWICE DAILY 1 Tube 0   • fluorouracil (EFUDEX) 5 % cream AAA bid x 2wks 1 Tube 0   • levothyroxine (SYNTHROID) 50 MCG Tab TAKE ONE TABLET BY MOUTH ONCE DAILY IN THE MORNING ON  AN  EMPTY  STOMACH (Patient not taking: Reported on 10/20/2017) 30 Tab 3   • ibuprofen (MOTRIN) 600 MG Tab Take 600 mg by mouth every 6 hours as needed.     • fluticasone (FLONASE) 50 MCG/ACT nasal spray Spray 1 Spray in nose every day.     • tiotropium (SPIRIVA) 18 MCG Cap Inhale 1 Cap by mouth every day. 30 Cap 3   • tramadol (ULTRAM) 50 MG Tab Take  mg by mouth every four hours as needed.       No current facility-administered medications for this visit.      She  has a past medical history of Anxiety; ASTHMA; Back pain; Bipolar affect, depressed (CMS-[facility]); Bronchitis; Cholelithiasis; Chronic airway obstruction, not elsewhere classified; Cigarette smoker one half pack a day or less; Depression; H/O cervical spine surgery (2002 / 2005); Hyponatremia; Pneumonia; Psychiatric disorder (2005); Psychosis; PTSD (post-traumatic stress disorder); Pulmonary nodule; S/P appendectomy (1968); and S/P repair of patent ductus arteriosus (1958).  She  has a past surgical history that includes other cardiac surgery; other (2002, 2005); other; appendectomy; and cristofer by laparoscopy (Bilateral, 5/21/2016).  Social History   Substance Use Topics   • Smoking status: Former Smoker     Packs/day: 0.50     Years: 15.00     Types: Cigarettes     Start date: 1/1/1999     Quit date: 8/20/2015   •  "Smokeless tobacco: Never Used   • Alcohol use No      Comment: occas glass of wine, Agrees to stop use     Social History     Social History Narrative    , 2 children.      Family History   Problem Relation Age of Onset   • Psychiatry Mother      depression   • Other Mother      TIA   • Diabetes Mother    • Psychiatry Sister      depression   • Cancer Father      prostate;skin     Family Status   Relation Status   • Mother  at age 91   • Sister    • Father  at age 86         ROS    Please see hpi     All other systems reviewed and are negative     Objective:     Blood pressure 124/70, pulse 86, temperature 37.1 °C (98.8 °F), height 1.702 m (5' 7.01\"), weight 94.3 kg (208 lb), SpO2 96 %. Body mass index is 32.57 kg/m².  Physical Exam:    Constitutional: Alert, no distress.  Skin: Warm, dry, good turgor, no rashes in visible areas.  Eye: Equal, round and reactive, conjunctiva clear, lids normal.  ENMT: Lips without lesions, good dentition, oropharynx clear.  Neck: Trachea midline, no masses, no thyromegaly. No cervical or supraclavicular lymphadenopathy.  Respiratory: Unlabored respiratory effort, lungs clear to auscultation, no wheezes, no ronchi.  Cardiovascular: Normal S1, S2, no murmur, no edema.  Abdomen: Soft, non-tender, no masses, no hepatosplenomegaly.  Psych: Alert and oriented x3, normal affect and mood.      SKIN EXAM  Several well-demarcated, round/oval lesions with a dull, verrucous surface and irregular stuck-on appearance on back and chest      multiple lesions on bilateral forearms, hands, and chest, with evidence of of solar damage present , spotty hyperpigmentation, scattered telangiectasias, and  Xerosis      PROCEDURE: CRYOTHERAPY  Discussed risks and benefits of cryotherapy including but not limited to scarring, hyperpigmentation, hypopigmentation, hypertrophic scarring, keiloid scarring, incomplete or no resolution of lesions treated,pain, undesirable cosemetic " result, blistering, potential need for additional treatment including more invasive treatment. Patient expresses understanding and verbally acknowledges risks and consent to treatment. 2  applications of cryotherapy with 3 second freeze thaw cycle was applied to  1AK and  all SK's. Patient tolerated procedure well. There were no complications. Aftercare instructions given.          Assessment and Plan:   The following treatment plan was discussed      1. Inflamed seborrheic keratosis  Patient tollerrated procedure well  There were no adverse events  Patient was given post procedure precautions       2. AK (actinic keratosis)  Patient tollerrated procedure well  There were no adverse events  Patient was given post procedure precautions       3. Visit for suture removal    Sutures removed in normal clean fashion.  There were no adverse events.  We also discussed results of pathology  Peak sun hours are 10am to 4pm  And future skin examinations along   With self skin exams--which have shown to decrease  Melanoma incidence by 63 %            HEALTH MAINTENANCE:    Instructed to Follow up in clinic or ER for worsening symptoms, difficulty breathing, lack of expected recovery, or should new symptoms or problems arise.    Followup: Return in about 3 months (around 3/27/2018) for Reevaluation.       Once again this medical record contains text that has been entered with the assistance of computer voice recognition and dictation software.  Therefore, it may contain unintended errors in text, spelling, punctuation, or grammar

## 2018-03-30 ENCOUNTER — OFFICE VISIT (OUTPATIENT)
Dept: MEDICAL GROUP | Age: 63
End: 2018-03-30
Payer: MEDICARE

## 2018-03-30 VITALS
BODY MASS INDEX: 32.39 KG/M2 | OXYGEN SATURATION: 90 % | DIASTOLIC BLOOD PRESSURE: 72 MMHG | WEIGHT: 206.4 LBS | SYSTOLIC BLOOD PRESSURE: 124 MMHG | HEART RATE: 89 BPM | HEIGHT: 67 IN | TEMPERATURE: 97.9 F

## 2018-03-30 DIAGNOSIS — L82.0 INFLAMED SEBORRHEIC KERATOSIS: ICD-10-CM

## 2018-03-30 DIAGNOSIS — F31.9 BIPOLAR 1 DISORDER (HCC): ICD-10-CM

## 2018-03-30 DIAGNOSIS — L57.0 AK (ACTINIC KERATOSIS): ICD-10-CM

## 2018-03-30 DIAGNOSIS — E03.4 HYPOTHYROIDISM DUE TO ACQUIRED ATROPHY OF THYROID: ICD-10-CM

## 2018-03-30 DIAGNOSIS — F41.9 ANXIETY: ICD-10-CM

## 2018-03-30 DIAGNOSIS — J41.8 MIXED SIMPLE AND MUCOPURULENT CHRONIC BRONCHITIS (HCC): ICD-10-CM

## 2018-03-30 DIAGNOSIS — B37.2 CANDIDAL INTERTRIGO: ICD-10-CM

## 2018-03-30 PROCEDURE — 17003 DESTRUCT PREMALG LES 2-14: CPT | Performed by: FAMILY MEDICINE

## 2018-03-30 PROCEDURE — 99214 OFFICE O/P EST MOD 30 MIN: CPT | Mod: 25 | Performed by: FAMILY MEDICINE

## 2018-03-30 PROCEDURE — 17110 DESTRUCTION B9 LES UP TO 14: CPT | Performed by: FAMILY MEDICINE

## 2018-03-30 PROCEDURE — 17000 DESTRUCT PREMALG LESION: CPT | Mod: 59 | Performed by: FAMILY MEDICINE

## 2018-03-30 NOTE — ASSESSMENT & PLAN NOTE
Levothyroxine 75mcg po q24h    denies any new fatigue, cold/heat intolerance, weight gain/weight loss, diarrhea/constipation, dry skin, myalgia, depressed mood, palpitations, tremmor, hair loss, and no goiter.    Results for JUAN MORELOS (MRN [id number]) as of 3/30/2018 10:26   Ref. Range 10/16/2017 13:07   TSH Latest Ref Range: 0.300 - 3.700 uIU/mL 2.120

## 2018-03-30 NOTE — PROGRESS NOTES
This medical record contains text that has been entered with the assistance of computer voice recognition and dictation software.  Therefore, it may contain unintended errors in text, spelling, punctuation, or grammar    Chief Complaint   Patient presents with   • Follow-Up       Alex Morelos is a 62 y.o. female here evaluation and management of: Hypothyroidism, chronic candidal intertrigo, obesity, bipolar disorder      HPI:     Inflamed seborrheic keratosis  Patient states that the previous session of cryotherapy worked very well, patient  has several more irritating lesions that they would like to have destroyed.    Candidal intertrigo  Overall this has resolved with our therapy she is using the topical nystatin powder 3 times weekly as prophylaxis.    Hypothyroidism due to acquired atrophy of thyroid  Levothyroxine 75mcg po q24h    denies any new fatigue, cold/heat intolerance, weight gain/weight loss, diarrhea/constipation, dry skin, myalgia, depressed mood, palpitations, tremmor, hair loss, and no goiter.    Results for ALEX MORELOS (MRN [id number]) as of 3/30/2018 10:26   Ref. Range 10/16/2017 13:07   TSH Latest Ref Range: 0.300 - 3.700 uIU/mL 2.120       Bipolar 1 disorder  Aripiprazole 10mg po q24h  Duloxetine 30mg po q24h    Has been stable on meds  And sees psychiatry q6 months              Denied all of the following         -  inflated self esteem or grandiosity, decreased need for sleep, more talkative than usual, racing thoughts or flight of ideas, distractibility, increase in goal-directed activity, excessive involvement in pleasurable activities that have a high potential for painful consequences, such as spending money or sexual indiscretion.        Current medicines (including changes today)  Current Outpatient Prescriptions   Medication Sig Dispense Refill   • nystatin (MYCOSTATIN) powder Apply 1 g to affected area(s) 4 times a day. 15 g 3   • levothyroxine (SYNTHROID) 75 MCG Tab Take 1 Tab  by mouth Every morning on an empty stomach. 90 Tab 2   • calcium-vitamin D (OSCAL-500) 500-125 MG-UNIT Tab Take 1 Tab by mouth every day. 360 Tab 1   • nystatin (MYCOSTATIN) 109262 UNIT/GM Cream topical cream APPLY ONE GRAM TOPICALLY TO AFFECTED AREA(S) TWICE DAILY 1 Tube 0   • omeprazole (PRILOSEC) 20 MG delayed-release capsule TAKE ONE CAPSULE BY MOUTH ONCE DAILY 30 Cap 0   • calcium-vitamin D (OSCAL 500 +D) 500-200 MG-UNIT Tab Take 1 Tab by mouth 2 times a day, with meals. 360 Tab 0   • terbinafine (LAMISIL) 250 MG Tab Take 1 Tab by mouth every day. 30 Tab 0   • triamcinolone acetonide (KENALOG) 0.1 % Ointment Aaa bid x 14 days then stop 1 Tube 1   • fluorouracil (EFUDEX) 5 % cream AAA bid x 2wks 1 Tube 0   • budesonide-formoterol (SYMBICORT) 160-4.5 MCG/ACT Aerosol Inhale 2 Puffs by mouth. Rinse mouth after each use.     • ipratropium-albuterol (DUONEB) 0.5-2.5 (3) MG/3ML nebulizer solution 3 mL by Nebulization route every 6 hours as needed for Shortness of Breath. 30 Bullet 3   • ibuprofen (MOTRIN) 600 MG Tab Take 600 mg by mouth every 6 hours as needed.     • fluticasone (FLONASE) 50 MCG/ACT nasal spray Spray 1 Spray in nose every day.     • tiotropium (SPIRIVA) 18 MCG Cap Inhale 1 Cap by mouth every day. 30 Cap 3   • omeprazole (PRILOSEC) 20 MG delayed-release capsule TAKE ONE CAPSULE BY MOUTH ONCE DAILY 30 Cap 3   • tramadol (ULTRAM) 50 MG Tab Take  mg by mouth every four hours as needed.     • hydrOXYzine (ATARAX) 50 MG Tab Take 50 mg by mouth 3 times a day as needed.     • duloxetine (CYMBALTA) 30 MG Cap DR Particles 30 mg every day. calms her down with pain     • aripiprazole (ABILIFY) 10 MG Tab Take 10 mg by mouth every day.     • baclofen (LIORESAL) 10 MG Tab Take 1 Tab by mouth 3 times a day. 60 Tab 1   • levothyroxine (SYNTHROID) 50 MCG Tab TAKE ONE TABLET BY MOUTH ONCE DAILY IN THE MORNING ON  AN  EMPTY  STOMACH (Patient not taking: Reported on 3/30/2018) 30 Tab 3   • albuterol 108 (90 BASE)  "MCG/ACT Aero Soln inhalation aerosol Inhale 2 Puffs by mouth every 6 hours as needed for Shortness of Breath. (Patient not taking: Reported on 3/30/2018) 8.5 g 3     No current facility-administered medications for this visit.      She  has a past medical history of Anxiety; ASTHMA; Back pain; Bipolar affect, depressed (CMS-HCC); Bronchitis; Cholelithiasis; Chronic airway obstruction, not elsewhere classified; Cigarette smoker one half pack a day or less; Depression; H/O cervical spine surgery (2002); Hyponatremia; Pneumonia; Psychiatric disorder (); Psychosis; PTSD (post-traumatic stress disorder); Pulmonary nodule; S/P appendectomy (); and S/P repair of patent ductus arteriosus ().  She  has a past surgical history that includes other cardiac surgery; other (, ); other; appendectomy; and cristofer by laparoscopy (Bilateral, 2016).  Social History   Substance Use Topics   • Smoking status: Former Smoker     Packs/day: 0.50     Years: 15.00     Types: Cigarettes     Start date: 1999     Quit date: 2015   • Smokeless tobacco: Never Used   • Alcohol use No      Comment: occas glass of wine, Agrees to stop use     Social History     Social History Narrative    , 2 children.      Family History   Problem Relation Age of Onset   • Psychiatry Mother      depression   • Other Mother      TIA   • Diabetes Mother    • Psychiatry Sister      depression   • Cancer Father      prostate;skin     Family Status   Relation Status   • Mother  at age 91   • Sister    • Father  at age 86         ROS    Please see hpi     All other systems reviewed and are negative     Objective:     Blood pressure 124/72, pulse 89, temperature 36.6 °C (97.9 °F), height 1.702 m (5' 7\"), weight 93.6 kg (206 lb 6.4 oz), SpO2 90 %. Body mass index is 32.33 kg/m².  Physical Exam:    Constitutional: Alert, no distress.  Skin: Warm, dry, good turgor, no rashes in visible areas.  Eye: Equal, " round and reactive, conjunctiva clear, lids normal.  ENMT: Lips without lesions, good dentition, oropharynx clear.  Neck: Trachea midline, no masses, no thyromegaly. No cervical or supraclavicular lymphadenopathy.  Respiratory: Unlabored respiratory effort, lungs clear to auscultation, no wheezes, no ronchi.  Cardiovascular: Normal S1, S2, no murmur, no edema.  Abdomen: Soft, non-tender, no masses, no hepatosplenomegaly.  Psych: Alert and oriented x3, normal affect and mood.      SKIN EXAM  Several irregular, pigmented, verrucous surface plaques with dried hemmorhage , greater than 1 cm on back and chest,         multiple lesions on bilateral forearms, hands, and chest, with evidence of of solar damage present , spotty hyperpigmentation, scattered telangiectasias, and  Xerosis      PROCEDURE: CRYOTHERAPY  Discussed risks and benefits of cryotherapy including but not limited to scarring, hyperpigmentation, hypopigmentation, hypertrophic scarring, keiloid scarring, incomplete or no resolution of lesions treated,pain, undesirable cosemetic result, blistering, potential need for additional treatment including more invasive treatment. Patient expresses understanding and verbally acknowledges risks and consent to treatment. 2  applications of cryotherapy with 3 second freeze thaw cycle was applied to  4AK's and  all irritated and inflamed Seborrheic Keratoses. Patient tolerated procedure well. There were no complications. Aftercare instructions given.            Assessment and Plan:   The following treatment plan was discussed      1. Inflamed seborrheic keratosis  Patient tollerrated procedure well  There were no adverse events  Patient was given post procedure precautions       2. Anxiety  Patient has been stable with current management  We will make no changes for now      3. Candidal intertrigo        I explained again to Maksim that she  is at risk for recurrence should use a drying agent indefinitely.  Use drying  agents including antifungal powders (eg. Nystatin, miconazole, undecylenic acid, and tolnaftat)  Oral Lamisil 250 mg daily x 30 days, stop if resolution precedes 30 days  Keep area air dried  Intermittent (eg, twice-weekly) use of topical antifungal is sometimes used in an attempt to decrease the likelihood of recurrent candidal infection.   Improve obesity, incontinence and DM if present        4. Hypothyroidism due to acquired atrophy of thyroid    Patient has been stable with current management  We will make no changes for now    - COMP METABOLIC PANEL; Future  - CBC WITH DIFFERENTIAL; Future  - LIPID PROFILE; Future  - TSH WITH REFLEX TO FT4    5. Mixed simple and mucopurulent chronic bronchitis (CMS-HCC)  Up to date on vaccinations  Counseled on the following   smoking cessation  avoidance of various other inhalational particulate exposures,   healthy diet and aim to achieve a normal body mass index (BMI).  Continue LABA plus inhaled corticosteroid, LAMA, anticholinergics  Continue home oxygen    pulmonology consult pending    6. AK (actinic keratosis)  Patient tollerrated procedure well  There were no adverse events  Patient was given post procedure precautions       7. Bipolar 1 disorder (CMS-HCC)  Patient has been stable with current management  We will make no changes for now          HEALTH MAINTENANCE:    Instructed to Follow up in clinic or ER for worsening symptoms, difficulty breathing, lack of expected recovery, or should new symptoms or problems arise.    Followup: Return in about 6 months (around 9/30/2018) for Reevaluation.       Once again this medical record contains text that has been entered with the assistance of computer voice recognition and dictation software.  Therefore, it may contain unintended errors in text, spelling, punctuation, or grammar

## 2018-03-30 NOTE — ASSESSMENT & PLAN NOTE
Overall this has resolved with our therapy she is using the topical nystatin powder 3 times weekly as prophylaxis.

## 2018-03-30 NOTE — ASSESSMENT & PLAN NOTE
Aripiprazole 10mg po q24h  Duloxetine 30mg po q24h    Has been stable on meds  And sees psychiatry q6 months              Denied all of the following         -  inflated self esteem or grandiosity, decreased need for sleep, more talkative than usual, racing thoughts or flight of ideas, distractibility, increase in goal-directed activity, excessive involvement in pleasurable activities that have a high potential for painful consequences, such as spending money or sexual indiscretion.

## 2018-05-18 ENCOUNTER — HOSPITAL ENCOUNTER (OUTPATIENT)
Dept: LAB | Facility: MEDICAL CENTER | Age: 63
End: 2018-05-18
Attending: FAMILY MEDICINE
Payer: MEDICARE

## 2018-05-18 DIAGNOSIS — E03.4 HYPOTHYROIDISM DUE TO ACQUIRED ATROPHY OF THYROID: ICD-10-CM

## 2018-05-18 LAB
ALBUMIN SERPL BCP-MCNC: 4.2 G/DL (ref 3.2–4.9)
ALBUMIN/GLOB SERPL: 1.7 G/DL
ALP SERPL-CCNC: 41 U/L (ref 30–99)
ALT SERPL-CCNC: 15 U/L (ref 2–50)
ANION GAP SERPL CALC-SCNC: 5 MMOL/L (ref 0–11.9)
AST SERPL-CCNC: 15 U/L (ref 12–45)
BASOPHILS # BLD AUTO: 1.2 % (ref 0–1.8)
BASOPHILS # BLD: 0.07 K/UL (ref 0–0.12)
BILIRUB SERPL-MCNC: 0.8 MG/DL (ref 0.1–1.5)
BUN SERPL-MCNC: 10 MG/DL (ref 8–22)
CALCIUM SERPL-MCNC: 9.1 MG/DL (ref 8.5–10.5)
CHLORIDE SERPL-SCNC: 102 MMOL/L (ref 96–112)
CHOLEST SERPL-MCNC: 155 MG/DL (ref 100–199)
CO2 SERPL-SCNC: 28 MMOL/L (ref 20–33)
CREAT SERPL-MCNC: 0.69 MG/DL (ref 0.5–1.4)
EOSINOPHIL # BLD AUTO: 0.14 K/UL (ref 0–0.51)
EOSINOPHIL NFR BLD: 2.4 % (ref 0–6.9)
ERYTHROCYTE [DISTWIDTH] IN BLOOD BY AUTOMATED COUNT: 49.8 FL (ref 35.9–50)
GLOBULIN SER CALC-MCNC: 2.5 G/DL (ref 1.9–3.5)
GLUCOSE SERPL-MCNC: 100 MG/DL (ref 65–99)
HCT VFR BLD AUTO: 46.8 % (ref 37–47)
HDLC SERPL-MCNC: 61 MG/DL
HGB BLD-MCNC: 16.1 G/DL (ref 12–16)
IMM GRANULOCYTES # BLD AUTO: 0.01 K/UL (ref 0–0.11)
IMM GRANULOCYTES NFR BLD AUTO: 0.2 % (ref 0–0.9)
LDLC SERPL CALC-MCNC: 74 MG/DL
LYMPHOCYTES # BLD AUTO: 1.88 K/UL (ref 1–4.8)
LYMPHOCYTES NFR BLD: 31.6 % (ref 22–41)
MCH RBC QN AUTO: 32.2 PG (ref 27–33)
MCHC RBC AUTO-ENTMCNC: 34.4 G/DL (ref 33.6–35)
MCV RBC AUTO: 93.6 FL (ref 81.4–97.8)
MONOCYTES # BLD AUTO: 0.4 K/UL (ref 0–0.85)
MONOCYTES NFR BLD AUTO: 6.7 % (ref 0–13.4)
NEUTROPHILS # BLD AUTO: 3.44 K/UL (ref 2–7.15)
NEUTROPHILS NFR BLD: 57.9 % (ref 44–72)
NRBC # BLD AUTO: 0 K/UL
NRBC BLD-RTO: 0 /100 WBC
PLATELET # BLD AUTO: 239 K/UL (ref 164–446)
PMV BLD AUTO: 9.8 FL (ref 9–12.9)
POTASSIUM SERPL-SCNC: 4.2 MMOL/L (ref 3.6–5.5)
PROT SERPL-MCNC: 6.7 G/DL (ref 6–8.2)
RBC # BLD AUTO: 5 M/UL (ref 4.2–5.4)
SODIUM SERPL-SCNC: 135 MMOL/L (ref 135–145)
TRIGL SERPL-MCNC: 100 MG/DL (ref 0–149)
TSH SERPL DL<=0.005 MIU/L-ACNC: 2.05 UIU/ML (ref 0.38–5.33)
WBC # BLD AUTO: 5.9 K/UL (ref 4.8–10.8)

## 2018-05-18 PROCEDURE — 80061 LIPID PANEL: CPT

## 2018-05-18 PROCEDURE — 80053 COMPREHEN METABOLIC PANEL: CPT

## 2018-05-18 PROCEDURE — 84443 ASSAY THYROID STIM HORMONE: CPT

## 2018-05-18 PROCEDURE — 36415 COLL VENOUS BLD VENIPUNCTURE: CPT

## 2018-05-18 PROCEDURE — 85025 COMPLETE CBC W/AUTO DIFF WBC: CPT

## 2018-06-20 ENCOUNTER — OFFICE VISIT (OUTPATIENT)
Dept: MEDICAL GROUP | Age: 63
End: 2018-06-20
Payer: MEDICARE

## 2018-06-20 VITALS
DIASTOLIC BLOOD PRESSURE: 62 MMHG | BODY MASS INDEX: 32.8 KG/M2 | HEIGHT: 67 IN | TEMPERATURE: 98.2 F | OXYGEN SATURATION: 91 % | WEIGHT: 209 LBS | SYSTOLIC BLOOD PRESSURE: 104 MMHG | HEART RATE: 69 BPM

## 2018-06-20 DIAGNOSIS — L82.0 INFLAMED SEBORRHEIC KERATOSIS: ICD-10-CM

## 2018-06-20 DIAGNOSIS — L73.9 FOLLICULITIS: ICD-10-CM

## 2018-06-20 DIAGNOSIS — L57.0 AK (ACTINIC KERATOSIS): ICD-10-CM

## 2018-06-20 PROCEDURE — 17003 DESTRUCT PREMALG LES 2-14: CPT | Performed by: FAMILY MEDICINE

## 2018-06-20 PROCEDURE — 99214 OFFICE O/P EST MOD 30 MIN: CPT | Mod: 25 | Performed by: FAMILY MEDICINE

## 2018-06-20 PROCEDURE — 17110 DESTRUCTION B9 LES UP TO 14: CPT | Performed by: FAMILY MEDICINE

## 2018-06-20 PROCEDURE — 17000 DESTRUCT PREMALG LESION: CPT | Mod: 59 | Performed by: FAMILY MEDICINE

## 2018-06-20 ASSESSMENT — PATIENT HEALTH QUESTIONNAIRE - PHQ9: CLINICAL INTERPRETATION OF PHQ2 SCORE: 0

## 2018-06-20 NOTE — PROGRESS NOTES
This medical record contains text that has been entered with the assistance of computer voice recognition and dictation software.  Therefore, it may contain unintended errors in text, spelling, punctuation, or grammar    Chief Complaint   Patient presents with   • Mass     Left armpit, x1 week         Alex Shepard is a 63 y.o. female here evaluation and management of: folliculitis, isk, ak      HPI:     Folliculitis  NEW PROBLEM    The patient states that she noticed this lump in her left axilla about a month ago.  She denies any fevers no chills no night sweats no breast lumps no unintentional weight loss.  Denies any cats at home scratches,    Inflamed seborrheic keratosis  Patient states she has more raised plaques which are causing discomfort getting caught on clothing.  Is very irritating and itchy.    Current medicines (including changes today)  Current Outpatient Prescriptions   Medication Sig Dispense Refill   • nystatin (MYCOSTATIN) powder Apply 1 g to affected area(s) 4 times a day. 15 g 3   • levothyroxine (SYNTHROID) 75 MCG Tab Take 1 Tab by mouth Every morning on an empty stomach. 90 Tab 2   • nystatin (MYCOSTATIN) 053341 UNIT/GM Cream topical cream APPLY ONE GRAM TOPICALLY TO AFFECTED AREA(S) TWICE DAILY 1 Tube 0   • calcium-vitamin D (OSCAL 500 +D) 500-200 MG-UNIT Tab Take 1 Tab by mouth 2 times a day, with meals. 360 Tab 0   • terbinafine (LAMISIL) 250 MG Tab Take 1 Tab by mouth every day. 30 Tab 0   • triamcinolone acetonide (KENALOG) 0.1 % Ointment Aaa bid x 14 days then stop 1 Tube 1   • fluorouracil (EFUDEX) 5 % cream AAA bid x 2wks 1 Tube 0   • ibuprofen (MOTRIN) 600 MG Tab Take 600 mg by mouth every 6 hours as needed.     • tramadol (ULTRAM) 50 MG Tab Take  mg by mouth every four hours as needed.     • duloxetine (CYMBALTA) 30 MG Cap DR Particles 30 mg every day. calms her down with pain     • aripiprazole (ABILIFY) 10 MG Tab Take 10 mg by mouth every day.     • baclofen (LIORESAL) 10  MG Tab Take 1 Tab by mouth 3 times a day. 60 Tab 1   • calcium-vitamin D (OSCAL-500) 500-125 MG-UNIT Tab Take 1 Tab by mouth every day. 360 Tab 1   • omeprazole (PRILOSEC) 20 MG delayed-release capsule TAKE ONE CAPSULE BY MOUTH ONCE DAILY 30 Cap 0   • budesonide-formoterol (SYMBICORT) 160-4.5 MCG/ACT Aerosol Inhale 2 Puffs by mouth. Rinse mouth after each use.     • ipratropium-albuterol (DUONEB) 0.5-2.5 (3) MG/3ML nebulizer solution 3 mL by Nebulization route every 6 hours as needed for Shortness of Breath. 30 Bullet 3   • levothyroxine (SYNTHROID) 50 MCG Tab TAKE ONE TABLET BY MOUTH ONCE DAILY IN THE MORNING ON  AN  EMPTY  STOMACH (Patient not taking: Reported on 3/30/2018) 30 Tab 3   • fluticasone (FLONASE) 50 MCG/ACT nasal spray Spray 1 Spray in nose every day.     • albuterol 108 (90 BASE) MCG/ACT Aero Soln inhalation aerosol Inhale 2 Puffs by mouth every 6 hours as needed for Shortness of Breath. (Patient not taking: Reported on 3/30/2018) 8.5 g 3   • tiotropium (SPIRIVA) 18 MCG Cap Inhale 1 Cap by mouth every day. 30 Cap 3   • omeprazole (PRILOSEC) 20 MG delayed-release capsule TAKE ONE CAPSULE BY MOUTH ONCE DAILY 30 Cap 3   • hydrOXYzine (ATARAX) 50 MG Tab Take 50 mg by mouth 3 times a day as needed.       No current facility-administered medications for this visit.      She  has a past medical history of Anxiety; ASTHMA; Back pain; Bipolar affect, depressed ([facility]); Bronchitis; Cholelithiasis; Chronic airway obstruction, not elsewhere classified; Cigarette smoker one half pack a day or less; Depression; H/O cervical spine surgery (2002 / 2005); Hyponatremia; Pneumonia; Psychiatric disorder (2005); Psychosis; PTSD (post-traumatic stress disorder); Pulmonary nodule; S/P appendectomy (1968); and S/P repair of patent ductus arteriosus (1958).  She  has a past surgical history that includes other cardiac surgery; other (2002, 2005); other; appendectomy; and cristofer by laparoscopy (Bilateral, 5/21/2016).  Social  "History   Substance Use Topics   • Smoking status: Current Some Day Smoker     Packs/day: 0.50     Years: 15.00     Types: Cigarettes     Start date: 1999     Last attempt to quit: 2015   • Smokeless tobacco: Never Used      Comment: 2 packs a week   • Alcohol use No      Comment: occas glass of wine, Agrees to stop use     Social History     Social History Narrative    , 2 children.      Family History   Problem Relation Age of Onset   • Psychiatry Mother      depression   • Other Mother      TIA   • Diabetes Mother    • Psychiatry Sister      depression   • Cancer Father      prostate;skin     Family Status   Relation Status   • Mother  at age 91   • Sister    • Father  at age 86         ROS    Please see hpi     All other systems reviewed and are negative     Objective:     Blood pressure 104/62, pulse 69, temperature 36.8 °C (98.2 °F), height 1.702 m (5' 7\"), weight 94.8 kg (209 lb), SpO2 91 %, not currently breastfeeding. Body mass index is 32.73 kg/m².  Physical Exam:    Constitutional: Alert, no distress.  Skin: Warm, dry, good turgor, no rashes in visible areas.  Eye: Equal, round and reactive, conjunctiva clear, lids normal.  ENMT: Lips without lesions, good dentition, oropharynx clear.  Neck: Trachea midline, no masses, no thyromegaly. No cervical or supraclavicular lymphadenopathy.  Respiratory: Unlabored respiratory effort, lungs clear to auscultation, no wheezes, no ronchi.  Cardiovascular: Normal S1, S2, no murmur, no edema.  Abdomen: Soft, non-tender, no masses, no hepatosplenomegaly.  Psych: Alert and oriented x3, normal affect and mood.  EXT--there is a .5cm round firm, nontender, flesh colored raised inflamed follicle    SKIN EXAM    ISK  Description--Several irregular, pigmented, verrucous surface plaques with dried hemmorhage      Sizegreater than 1 cm on bilateral shoulders    SymptomsPatient has been complaining of lesions which have been irritating " flaking,getting caught on clothing and jewelry,       AK  multiple lesions on bilateral forearms, hands, and chest, with evidence of of solar damage present , spotty hyperpigmentation, scattered telangiectasias, and  Xerosis      PROCEDURE: CRYOTHERAPY  Discussed risks and benefits of cryotherapy including but not limited to scarring, hyperpigmentation, hypopigmentation, hypertrophic scarring, keiloid scarring, incomplete or no resolution of lesions treated,pain, undesirable cosemetic result, blistering, potential need for additional treatment including more invasive treatment. Patient expresses understanding and verbally acknowledges risks and consent to treatment. 2  applications of cryotherapy with 3 second freeze thaw cycle was applied to  3AK's and  all irritated and inflamed Seborrheic Keratoses. Patient tolerated procedure well. There were no complications. Aftercare instructions given.        Assessment and Plan:   The following treatment plan was discussed      1. Folliculitis  Routine precautions given  No clinical indication for antibiotics  We will monitor this closely    2. AK (actinic keratosis)  Patient tollerrated procedure well  There were no adverse events  Patient was given post procedure precautions       3. Inflamed seborrheic keratosis  Patient tollerrated procedure well  There were no adverse events  Patient was given post procedure precautions           HEALTH MAINTENANCE:    Instructed to Follow up in clinic or ER for worsening symptoms, difficulty breathing, lack of expected recovery, or should new symptoms or problems arise.    Followup: Return in about 3 months (around 9/20/2018) for Reevaluation.       Once again this medical record contains text that has been entered with the assistance of computer voice recognition and dictation software.  Therefore, it may contain unintended errors in text, spelling, punctuation, or grammar

## 2018-06-20 NOTE — ASSESSMENT & PLAN NOTE
NEW PROBLEM    The patient states that she noticed this lump in her left axilla about a month ago.  She denies any fevers no chills no night sweats no breast lumps no unintentional weight loss.  Denies any cats at home scratches,

## 2018-06-20 NOTE — ASSESSMENT & PLAN NOTE
Patient states she has more raised plaques which are causing discomfort getting caught on clothing.  Is very irritating and itchy.

## 2018-09-18 DIAGNOSIS — E03.4 HYPOTHYROIDISM DUE TO ACQUIRED ATROPHY OF THYROID: ICD-10-CM

## 2018-09-21 RX ORDER — LEVOTHYROXINE SODIUM 0.07 MG/1
TABLET ORAL
Qty: 90 TAB | Refills: 2 | Status: SHIPPED | OUTPATIENT
Start: 2018-09-21 | End: 2018-10-12 | Stop reason: SDUPTHER

## 2018-10-12 ENCOUNTER — OFFICE VISIT (OUTPATIENT)
Dept: MEDICAL GROUP | Age: 63
End: 2018-10-12
Payer: MEDICARE

## 2018-10-12 VITALS
SYSTOLIC BLOOD PRESSURE: 118 MMHG | HEART RATE: 72 BPM | TEMPERATURE: 98.8 F | BODY MASS INDEX: 32.96 KG/M2 | HEIGHT: 67 IN | OXYGEN SATURATION: 91 % | DIASTOLIC BLOOD PRESSURE: 62 MMHG | WEIGHT: 210 LBS

## 2018-10-12 DIAGNOSIS — B37.2 CANDIDIASIS, INTERTRIGO: ICD-10-CM

## 2018-10-12 DIAGNOSIS — Z12.31 VISIT FOR SCREENING MAMMOGRAM: ICD-10-CM

## 2018-10-12 DIAGNOSIS — E03.4 HYPOTHYROIDISM DUE TO ACQUIRED ATROPHY OF THYROID: ICD-10-CM

## 2018-10-12 DIAGNOSIS — Z23 NEED FOR VACCINATION: ICD-10-CM

## 2018-10-12 DIAGNOSIS — R91.1 PULMONARY NODULE: ICD-10-CM

## 2018-10-12 DIAGNOSIS — E66.9 OBESITY (BMI 30-39.9): ICD-10-CM

## 2018-10-12 DIAGNOSIS — B37.2 CANDIDAL INTERTRIGO: ICD-10-CM

## 2018-10-12 PROCEDURE — 99214 OFFICE O/P EST MOD 30 MIN: CPT | Mod: 25 | Performed by: FAMILY MEDICINE

## 2018-10-12 PROCEDURE — 90662 IIV NO PRSV INCREASED AG IM: CPT | Performed by: FAMILY MEDICINE

## 2018-10-12 PROCEDURE — G0008 ADMIN INFLUENZA VIRUS VAC: HCPCS | Performed by: FAMILY MEDICINE

## 2018-10-12 RX ORDER — DILTIAZEM HYDROCHLORIDE 180 MG/1
180 CAPSULE, COATED, EXTENDED RELEASE ORAL DAILY
COMMUNITY
End: 2022-07-01

## 2018-10-12 RX ORDER — LEVOTHYROXINE SODIUM 0.07 MG/1
TABLET ORAL
Qty: 90 TAB | Refills: 0 | Status: SHIPPED | OUTPATIENT
Start: 2018-10-12 | End: 2020-07-02 | Stop reason: SDUPTHER

## 2018-10-12 RX ORDER — NYSTATIN 100000 [USP'U]/G
1 POWDER TOPICAL 4 TIMES DAILY
Qty: 15 G | Refills: 3 | Status: SHIPPED | OUTPATIENT
Start: 2018-10-12 | End: 2019-12-18 | Stop reason: SDUPTHER

## 2018-10-12 NOTE — ASSESSMENT & PLAN NOTE
Overall she is doing well she uses her nystatin powder prophylactically as prescribed.  She does not complain of any new recent outbreaks.

## 2018-10-12 NOTE — ASSESSMENT & PLAN NOTE
Synthroid 75 mg p.o. every 12 hours    Patient denies any constipation/diarrhea no dry skin no palpitations no cold or heat intolerance no weight gain no weight loss no new fatigue, no tremor no hair loss.

## 2018-10-12 NOTE — PROGRESS NOTES
This medical record contains text that has been entered with the assistance of computer voice recognition and dictation software.  Therefore, it may contain unintended errors in text, spelling, punctuation, or grammar    Chief Complaint   Patient presents with   • Follow-Up         Alex Shepard is a 63 y.o. female here evaluation and management of: Routine follow-up,      HPI:     Candidal intertrigo  Overall she is doing well she uses her nystatin powder prophylactically as prescribed.  She does not complain of any new recent outbreaks.    Pulmonary nodule  Patient is due for repeat CT scan to monitor her pulmonary nodules.  Patient denies any shortness of breath no cough no hemoptysis no unintentional weight loss.    Impression       1.  1.6 cm nodule infiltration right lower lobe, 4.8 mm nodular infiltrate right lower lobe, and 2.7 mm nodule right upper lobe unchanged since 6/17/2014. 2 years stability suggests benignity.  2.  Hyperexpanded and emphysematous lungs. No pleural effusions.  3.  Postoperative changes posterior dome right lobe of the liver appear unchanged.       Reading Provider Reading Date   Steve Eli M.D. Sep 12, 2016   Signing Provider Signing Date Signing Time   Steve Eli M.D. Sep 12, 2016 11:14 AM   Lab Information         Hypothyroidism due to acquired atrophy of thyroid  Synthroid 75 mg p.o. every 12 hours    Patient denies any constipation/diarrhea no dry skin no palpitations no cold or heat intolerance no weight gain no weight loss no new fatigue, no tremor no hair loss.    Obesity (BMI 30-39.9)  The patient has not been consistent with lifestyle modification such as exercise or her diet.  She understands the relationship between weight and health.    Current medicines (including changes today)  Current Outpatient Prescriptions   Medication Sig Dispense Refill   • levothyroxine (SYNTHROID) 75 MCG Tab TAKE ONE TABLET BY MOUTH IN THE MORNING ON  AN  EMPTY  STOMACH 90 Tab 0   • nystatin  (MYCOSTATIN) powder Apply 1 g to affected area(s) 4 times a day. 15 g 3   • DILTIAZem CD (CARDIZEM CD) 180 MG CAPSULE SR 24 HR Take 180 mg by mouth every day.     • nystatin (MYCOSTATIN) 702417 UNIT/GM Cream topical cream APPLY ONE GRAM TOPICALLY TO AFFECTED AREA(S) TWICE DAILY 1 Tube 0   • calcium-vitamin D (OSCAL 500 +D) 500-200 MG-UNIT Tab Take 1 Tab by mouth 2 times a day, with meals. 360 Tab 0   • terbinafine (LAMISIL) 250 MG Tab Take 1 Tab by mouth every day. 30 Tab 0   • triamcinolone acetonide (KENALOG) 0.1 % Ointment Aaa bid x 14 days then stop 1 Tube 1   • fluorouracil (EFUDEX) 5 % cream AAA bid x 2wks 1 Tube 0   • budesonide-formoterol (SYMBICORT) 160-4.5 MCG/ACT Aerosol Inhale 2 Puffs by mouth. Rinse mouth after each use.     • ipratropium-albuterol (DUONEB) 0.5-2.5 (3) MG/3ML nebulizer solution 3 mL by Nebulization route every 6 hours as needed for Shortness of Breath. 30 Bullet 3   • ibuprofen (MOTRIN) 600 MG Tab Take 600 mg by mouth every 6 hours as needed.     • tiotropium (SPIRIVA) 18 MCG Cap Inhale 1 Cap by mouth every day. 30 Cap 3   • omeprazole (PRILOSEC) 20 MG delayed-release capsule TAKE ONE CAPSULE BY MOUTH ONCE DAILY 30 Cap 3   • tramadol (ULTRAM) 50 MG Tab Take  mg by mouth every four hours as needed.     • duloxetine (CYMBALTA) 30 MG Cap DR Particles 30 mg every day. calms her down with pain     • aripiprazole (ABILIFY) 10 MG Tab Take 10 mg by mouth every day.     • baclofen (LIORESAL) 10 MG Tab Take 1 Tab by mouth 3 times a day. 60 Tab 1   • fluticasone (FLONASE) 50 MCG/ACT nasal spray Spray 1 Spray in nose every day.       No current facility-administered medications for this visit.      She  has a past medical history of Anxiety; ASTHMA; Back pain; Bipolar affect, depressed (HCC); Bronchitis; Cholelithiasis; Chronic airway obstruction, not elsewhere classified; Cigarette smoker one half pack a day or less; Depression; H/O cervical spine surgery (2002 / 2005); Hyponatremia;  "Pneumonia; Psychiatric disorder (); Psychosis (HCC); PTSD (post-traumatic stress disorder); Pulmonary nodule; S/P appendectomy (); and S/P repair of patent ductus arteriosus ().  She  has a past surgical history that includes other cardiac surgery; other (, ); other; appendectomy; and cristofer by laparoscopy (Bilateral, 2016).  Social History   Substance Use Topics   • Smoking status: Current Some Day Smoker     Packs/day: 0.50     Years: 15.00     Types: Cigarettes     Start date: 1999     Last attempt to quit: 2015   • Smokeless tobacco: Never Used      Comment: 2 packs a week   • Alcohol use No      Comment: occas glass of wine, Agrees to stop use     Social History     Social History Narrative    , 2 children.      Family History   Problem Relation Age of Onset   • Psychiatry Mother         depression   • Other Mother         TIA   • Diabetes Mother    • Psychiatry Sister         depression   • Cancer Father         prostate;skin     Family Status   Relation Status   • Mo  at age 91   • Sis    • Fa  at age 86         ROS    Please see hpi     All other systems reviewed and are negative     Objective:     Blood pressure 118/62, pulse 72, temperature 37.1 °C (98.8 °F), temperature source Temporal, height 1.702 m (5' 7\"), weight 95.3 kg (210 lb), SpO2 91 %, not currently breastfeeding. Body mass index is 32.89 kg/m².  Physical Exam:    Constitutional: Alert, no distress.  Skin: Warm, dry, good turgor, no rashes in visible areas.  Eye: Equal, round and reactive, conjunctiva clear, lids normal.  ENMT: Lips without lesions, good dentition, oropharynx clear.  Neck: Trachea midline, no masses, no thyromegaly. No cervical or supraclavicular lymphadenopathy.  Respiratory: Unlabored respiratory effort, lungs clear to auscultation, no wheezes, no ronchi.  Cardiovascular: Normal S1, S2, no murmur, no edema.  Abdomen: Soft, non-tender, no masses, no " hepatosplenomegaly.  Psych: Alert and oriented x3, normal affect and mood.          Assessment and Plan:   The following treatment plan was discussed      1. Hypothyroidism due to acquired atrophy of thyroid    Patient has been stable with current management  We will make no changes for now    - levothyroxine (SYNTHROID) 75 MCG Tab; TAKE ONE TABLET BY MOUTH IN THE MORNING ON  AN  EMPTY  STOMACH  Dispense: 90 Tab; Refill: 0    2.  Candidal intertrigo    This patients is at risk for recurrence should use a drying agent indefinitely.  Use drying agents including antifungal powders (eg. Nystatin, miconazole, undecylenic acid, and tolnaftat)  Oral Lamisil 250 mg daily x 30 days, stop if resolution precedes 30 days  Keep area air dried  Intermittent (eg, twice-weekly) use of topical antifungal is sometimes used in an attempt to decrease the likelihood of recurrent candidal infection.   Improve obesity, incontinence and DM if present        - nystatin (MYCOSTATIN) powder; Apply 1 g to affected area(s) 4 times a day.  Dispense: 15 g; Refill: 3    3. Need for vaccination    Vaccine was administered today without adverse event.    - INFLUENZA VACCINE, HIGH DOSE (65+ ONLY)    4. Visit for screening mammogram    Due for lung cancer screening    - MA-SCREEN MAMMO W/CAD-BILAT; Future    5. Pulmonary nodule  Due for repeat CT        6. Obesity (BMI 30-39.9)    - Patient identified as having weight management issue.  Appropriate orders and counseling given.        HEALTH MAINTENANCE:    Instructed to Follow up in clinic or ER for worsening symptoms, difficulty breathing, lack of expected recovery, or should new symptoms or problems arise.    Followup: Return in about 6 months (around 4/12/2019).       Once again this medical record contains text that has been entered with the assistance of computer voice recognition and dictation software.  Therefore, it may contain unintended errors in text, spelling, punctuation, or grammar

## 2018-10-12 NOTE — ASSESSMENT & PLAN NOTE
The patient has not been consistent with lifestyle modification such as exercise or her diet.  She understands the relationship between weight and health.

## 2018-10-12 NOTE — ASSESSMENT & PLAN NOTE
Patient is due for repeat CT scan to monitor her pulmonary nodules.  Patient denies any shortness of breath no cough no hemoptysis no unintentional weight loss.    Impression       1.  1.6 cm nodule infiltration right lower lobe, 4.8 mm nodular infiltrate right lower lobe, and 2.7 mm nodule right upper lobe unchanged since 6/17/2014. 2 years stability suggests benignity.  2.  Hyperexpanded and emphysematous lungs. No pleural effusions.  3.  Postoperative changes posterior dome right lobe of the liver appear unchanged.       Reading Provider Reading Date   Steve Eli M.D. Sep 12, 2016   Signing Provider Signing Date Signing Time   Steve Eli M.D. Sep 12, 2016 11:14 AM   Lab Information

## 2018-10-29 ENCOUNTER — OFFICE VISIT (OUTPATIENT)
Dept: URGENT CARE | Facility: CLINIC | Age: 63
End: 2018-10-29
Payer: MEDICARE

## 2018-10-29 VITALS
HEART RATE: 79 BPM | WEIGHT: 209 LBS | SYSTOLIC BLOOD PRESSURE: 140 MMHG | BODY MASS INDEX: 32.8 KG/M2 | DIASTOLIC BLOOD PRESSURE: 84 MMHG | HEIGHT: 67 IN | TEMPERATURE: 97.7 F | OXYGEN SATURATION: 90 %

## 2018-10-29 DIAGNOSIS — R06.2 WHEEZING: ICD-10-CM

## 2018-10-29 DIAGNOSIS — J98.8 RTI (RESPIRATORY TRACT INFECTION): ICD-10-CM

## 2018-10-29 PROCEDURE — 94640 AIRWAY INHALATION TREATMENT: CPT | Performed by: FAMILY MEDICINE

## 2018-10-29 PROCEDURE — 99214 OFFICE O/P EST MOD 30 MIN: CPT | Mod: 25 | Performed by: FAMILY MEDICINE

## 2018-10-29 RX ORDER — IPRATROPIUM BROMIDE AND ALBUTEROL SULFATE 2.5; .5 MG/3ML; MG/3ML
3 SOLUTION RESPIRATORY (INHALATION) ONCE
Status: COMPLETED | OUTPATIENT
Start: 2018-10-29 | End: 2018-10-29

## 2018-10-29 RX ORDER — ALBUTEROL SULFATE 2.5 MG/3ML
2.5 SOLUTION RESPIRATORY (INHALATION) ONCE
Status: COMPLETED | OUTPATIENT
Start: 2018-10-29 | End: 2018-10-29

## 2018-10-29 RX ORDER — DOXYCYCLINE HYCLATE 100 MG
100 TABLET ORAL EVERY 12 HOURS
Qty: 14 TAB | Refills: 0 | Status: SHIPPED | OUTPATIENT
Start: 2018-10-29 | End: 2018-11-05

## 2018-10-29 RX ORDER — PREDNISONE 20 MG/1
TABLET ORAL
Qty: 21 TAB | Refills: 0 | Status: SHIPPED | OUTPATIENT
Start: 2018-10-29 | End: 2021-02-10

## 2018-10-29 RX ORDER — METHYLPREDNISOLONE SODIUM SUCCINATE 125 MG/2ML
94 INJECTION, POWDER, LYOPHILIZED, FOR SOLUTION INTRAMUSCULAR; INTRAVENOUS ONCE
Status: COMPLETED | OUTPATIENT
Start: 2018-10-29 | End: 2018-10-29

## 2018-10-29 RX ORDER — ALBUTEROL SULFATE 2.5 MG/3ML
2.5 SOLUTION RESPIRATORY (INHALATION) ONCE
OUTPATIENT
Start: 2018-10-29 | End: 2018-10-30

## 2018-10-29 RX ORDER — ALBUTEROL SULFATE 90 UG/1
2 AEROSOL, METERED RESPIRATORY (INHALATION) EVERY 6 HOURS PRN
Qty: 8.5 G | Refills: 3 | Status: SHIPPED | OUTPATIENT
Start: 2018-10-29 | End: 2019-07-31 | Stop reason: SDUPTHER

## 2018-10-29 RX ADMIN — METHYLPREDNISOLONE SODIUM SUCCINATE 94 MG: 125 INJECTION, POWDER, LYOPHILIZED, FOR SOLUTION INTRAMUSCULAR; INTRAVENOUS at 11:30

## 2018-10-29 RX ADMIN — ALBUTEROL SULFATE 2.5 MG: 2.5 SOLUTION RESPIRATORY (INHALATION) at 13:15

## 2018-10-29 RX ADMIN — IPRATROPIUM BROMIDE AND ALBUTEROL SULFATE 3 ML: 2.5; .5 SOLUTION RESPIRATORY (INHALATION) at 11:30

## 2018-10-29 ASSESSMENT — ENCOUNTER SYMPTOMS
NAUSEA: 0
SORE THROAT: 1
SPUTUM PRODUCTION: 0
COUGH: 1
VOMITING: 0
CHILLS: 0
WHEEZING: 1
FEVER: 0
DIZZINESS: 0

## 2018-10-29 NOTE — PROGRESS NOTES
Subjective:      Alex Shepard is a 63 y.o. female who presents with Cold Symptoms (x 1 week)      - This is a very pleasant, well and non-toxic appearing 63 y.o. female with complaints of 1-2 wks w/ cough/chest congestion/wheezing and sinus congestion/drip as well. No NVFC/cp/sob           ALLERGIES:  Penicillins and Nicoderm [nicotine]     PMH:  Past Medical History:   Diagnosis Date   • Anxiety    • ASTHMA    • Back pain    • Bipolar affect, depressed (HCC)    • Bronchitis    • Cholelithiasis    • Chronic airway obstruction, not elsewhere classified    • Cigarette smoker one half pack a day or less    • Depression    • H/O cervical spine surgery 2002 / 2005   • Hyponatremia    • Pneumonia    • Psychiatric disorder 2005    depression, hallucinations- Dr. Taylor Kimble   • Psychosis (HCC)     acute   • PTSD (post-traumatic stress disorder)     lost sister and mother in same, had 2 neck surgeries   • Pulmonary nodule    • S/P appendectomy 1968   • S/P repair of patent ductus arteriosus 1958        MEDS:    Current Outpatient Prescriptions:   •  levothyroxine (SYNTHROID) 75 MCG Tab, TAKE ONE TABLET BY MOUTH IN THE MORNING ON  AN  EMPTY  STOMACH, Disp: 90 Tab, Rfl: 0  •  nystatin (MYCOSTATIN) powder, Apply 1 g to affected area(s) 4 times a day., Disp: 15 g, Rfl: 3  •  aripiprazole (ABILIFY) 10 MG Tab, Take 10 mg by mouth every day., Disp: , Rfl:   •  DILTIAZem CD (CARDIZEM CD) 180 MG CAPSULE SR 24 HR, Take 180 mg by mouth every day., Disp: , Rfl:   •  calcium-vitamin D (OSCAL 500 +D) 500-200 MG-UNIT Tab, Take 1 Tab by mouth 2 times a day, with meals., Disp: 360 Tab, Rfl: 0  •  triamcinolone acetonide (KENALOG) 0.1 % Ointment, Aaa bid x 14 days then stop (Patient not taking: Reported on 10/29/2018), Disp: 1 Tube, Rfl: 1  •  budesonide-formoterol (SYMBICORT) 160-4.5 MCG/ACT Aerosol, Inhale 2 Puffs by mouth. Rinse mouth after each use., Disp: , Rfl:   •  ibuprofen (MOTRIN) 600 MG Tab, Take 600 mg by mouth every 6 hours  "as needed., Disp: , Rfl:   •  omeprazole (PRILOSEC) 20 MG delayed-release capsule, TAKE ONE CAPSULE BY MOUTH ONCE DAILY, Disp: 30 Cap, Rfl: 3  •  tramadol (ULTRAM) 50 MG Tab, Take  mg by mouth every four hours as needed., Disp: , Rfl:   •  duloxetine (CYMBALTA) 30 MG Cap DR Particles, 30 mg every day. calms her down with pain, Disp: , Rfl:     Current Facility-Administered Medications:   •  methylPREDNISolone sod succ (SOLU-MEDROL) 125 MG injection 94 mg, 94 mg, Intramuscular, Once, Fredis Turner M.D.  •  ipratropium-albuterol (DUONEB) nebulizer solution, 3 mL, Nebulization, Once, Fredis Turner M.D.  •  albuterol (PROVENTIL) 2.5mg/3ml nebulizer solution 2.5 mg, 2.5 mg, Nebulization, Once, Fredis Turner M.D.    ** I have documented what I find to be significant in regards to past medical, social, family and surgical history  in my HPI or under PMH/PSH/FH review section, otherwise it is contributory **           HPI    Review of Systems   Constitutional: Negative for chills and fever.   HENT: Positive for congestion and sore throat.    Respiratory: Positive for cough and wheezing. Negative for sputum production ( dry).    Gastrointestinal: Negative for nausea and vomiting.   Neurological: Negative for dizziness.   All other systems reviewed and are negative.         Objective:     /84 (BP Location: Left arm, Patient Position: Sitting, BP Cuff Size: Adult)   Pulse 79   Temp 36.5 °C (97.7 °F) (Temporal)   Ht 1.702 m (5' 7\")   Wt 94.8 kg (209 lb)   SpO2 90%   BMI 32.73 kg/m²      Physical Exam   Constitutional: She appears well-developed. No distress.   HENT:   Head: Normocephalic and atraumatic.   Mouth/Throat: Oropharynx is clear and moist.   Eyes: Conjunctivae are normal.   Neck: Neck supple.   Cardiovascular: Regular rhythm.    No murmur heard.  Pulmonary/Chest: Effort normal. No respiratory distress. She has wheezes.   Neurological: She is alert. She exhibits normal muscle tone.   Skin: " Skin is warm and dry.   Psychiatric: She has a normal mood and affect. Judgment normal.   Nursing note and vitals reviewed.              Assessment/Plan:         1. RTI (respiratory tract infection)     2. Wheezing  methylPREDNISolone sod succ (SOLU-MEDROL) 125 MG injection 94 mg    ipratropium-albuterol (DUONEB) nebulizer solution    albuterol (PROVENTIL) 2.5mg/3ml nebulizer solution 2.5 mg             Dx & d/c instructions discussed w/ patient and/or family members.     ER precautions (worsening signs symptoms and when to go to ER) discussed.    Follow up w/ PCP in 2-3 days to make sure symptoms improving and no further intervention/treatment and/or work-up needed was advised, ER if feeling worse or not improving in 2 days.    Possible side effects (i.e. Rash, GI upset/constipation, sedation, elevation of BP or sugars) of any medications given discussed.     Patient left in stable condition

## 2018-12-21 ENCOUNTER — HOSPITAL ENCOUNTER (OUTPATIENT)
Dept: RADIOLOGY | Facility: MEDICAL CENTER | Age: 63
End: 2018-12-21
Attending: FAMILY MEDICINE
Payer: MEDICARE

## 2018-12-21 DIAGNOSIS — Z12.31 VISIT FOR SCREENING MAMMOGRAM: ICD-10-CM

## 2018-12-21 PROCEDURE — 77067 SCR MAMMO BI INCL CAD: CPT

## 2019-01-11 ENCOUNTER — OFFICE VISIT (OUTPATIENT)
Dept: URGENT CARE | Facility: CLINIC | Age: 64
End: 2019-01-11
Payer: MEDICARE

## 2019-01-11 VITALS
DIASTOLIC BLOOD PRESSURE: 82 MMHG | SYSTOLIC BLOOD PRESSURE: 136 MMHG | OXYGEN SATURATION: 94 % | WEIGHT: 209 LBS | TEMPERATURE: 98.2 F | RESPIRATION RATE: 12 BRPM | HEIGHT: 67 IN | BODY MASS INDEX: 32.8 KG/M2 | HEART RATE: 72 BPM

## 2019-01-11 DIAGNOSIS — J44.1 COPD EXACERBATION (HCC): ICD-10-CM

## 2019-01-11 PROCEDURE — 99214 OFFICE O/P EST MOD 30 MIN: CPT | Performed by: PHYSICIAN ASSISTANT

## 2019-01-11 RX ORDER — CODEINE PHOSPHATE AND GUAIFENESIN 10; 100 MG/5ML; MG/5ML
5 SOLUTION ORAL EVERY 4 HOURS PRN
Qty: 120 ML | Refills: 0 | Status: SHIPPED | OUTPATIENT
Start: 2019-01-11 | End: 2019-01-18

## 2019-01-11 RX ORDER — DOXYCYCLINE HYCLATE 100 MG/1
100 CAPSULE ORAL 2 TIMES DAILY
Qty: 14 EACH | Refills: 0 | Status: SHIPPED | OUTPATIENT
Start: 2019-01-11 | End: 2019-01-18

## 2019-01-11 RX ORDER — METHYLPREDNISOLONE 4 MG/1
TABLET ORAL
Qty: 1 KIT | Refills: 0 | Status: SHIPPED | OUTPATIENT
Start: 2019-01-11 | End: 2019-04-12

## 2019-01-11 ASSESSMENT — ENCOUNTER SYMPTOMS
SORE THROAT: 1
COUGH: 1
SHORTNESS OF BREATH: 1
SPUTUM PRODUCTION: 1
PALPITATIONS: 0
WHEEZING: 1
FEVER: 0
CHILLS: 0
HEMOPTYSIS: 0

## 2019-01-11 ASSESSMENT — COPD QUESTIONNAIRES: COPD: 1

## 2019-01-11 NOTE — PROGRESS NOTES
Subjective:      Alex Shepard is a 63 y.o. female who presents with Cough (X2 weeks)            Cough   This is a new problem. The current episode started in the past 7 days. The problem has been unchanged. The cough is productive of sputum. Associated symptoms include a sore throat, shortness of breath and wheezing. Pertinent negatives include no chest pain, chills, ear pain, fever or hemoptysis. The symptoms are aggravated by lying down. She has tried OTC cough suppressant, a beta-agonist inhaler and steroid inhaler for the symptoms. The treatment provided mild relief. Her past medical history is significant for COPD.       Review of Systems   Constitutional: Positive for malaise/fatigue. Negative for chills and fever.   HENT: Positive for congestion and sore throat. Negative for ear pain.    Respiratory: Positive for cough, sputum production, shortness of breath and wheezing. Negative for hemoptysis.    Cardiovascular: Negative for chest pain and palpitations.   All other systems reviewed and are negative.    PMH:  has a past medical history of Anxiety; ASTHMA; Back pain; Bipolar affect, depressed ([facility]); Bronchitis; Cholelithiasis; Chronic airway obstruction, not elsewhere classified; Cigarette smoker one half pack a day or less; Depression; H/O cervical spine surgery (2002 / 2005); Hyponatremia; Pneumonia; Psychiatric disorder (2005); Psychosis ([facility]); PTSD (post-traumatic stress disorder); Pulmonary nodule; S/P appendectomy (1968); and S/P repair of patent ductus arteriosus (1958).  MEDS:   Current Outpatient Prescriptions:   •  doxycycline (VIBRAMYCIN) 100 MG Cap, Take 1 Cap by mouth 2 times a day for 7 days., Disp: 14 Each, Rfl: 0  •  MethylPREDNISolone (MEDROL DOSEPAK) 4 MG Tablet Therapy Pack, Take as directed on package., Disp: 1 Kit, Rfl: 0  •  guaifenesin-codeine (CHERATUSSIN AC) Solution oral solution, Take 5 mL by mouth every four hours as needed for Cough for up to 7 days., Disp: 120 mL, Rfl: 0  •   albuterol (PROAIR HFA) 108 (90 Base) MCG/ACT Aero Soln inhalation aerosol, Inhale 2 Puffs by mouth every 6 hours as needed for Shortness of Breath., Disp: 8.5 g, Rfl: 3  •  predniSONE (DELTASONE) 20 MG Tab, 1 tab TID x 2 days, then 1 tab BID x 5 days, then 1 tab QD x 5, Disp: 21 Tab, Rfl: 0  •  levothyroxine (SYNTHROID) 75 MCG Tab, TAKE ONE TABLET BY MOUTH IN THE MORNING ON  AN  EMPTY  STOMACH, Disp: 90 Tab, Rfl: 0  •  nystatin (MYCOSTATIN) powder, Apply 1 g to affected area(s) 4 times a day., Disp: 15 g, Rfl: 3  •  DILTIAZem CD (CARDIZEM CD) 180 MG CAPSULE SR 24 HR, Take 180 mg by mouth every day., Disp: , Rfl:   •  calcium-vitamin D (OSCAL 500 +D) 500-200 MG-UNIT Tab, Take 1 Tab by mouth 2 times a day, with meals., Disp: 360 Tab, Rfl: 0  •  triamcinolone acetonide (KENALOG) 0.1 % Ointment, Aaa bid x 14 days then stop, Disp: 1 Tube, Rfl: 1  •  budesonide-formoterol (SYMBICORT) 160-4.5 MCG/ACT Aerosol, Inhale 2 Puffs by mouth. Rinse mouth after each use., Disp: , Rfl:   •  ibuprofen (MOTRIN) 600 MG Tab, Take 600 mg by mouth every 6 hours as needed., Disp: , Rfl:   •  omeprazole (PRILOSEC) 20 MG delayed-release capsule, TAKE ONE CAPSULE BY MOUTH ONCE DAILY, Disp: 30 Cap, Rfl: 3  •  tramadol (ULTRAM) 50 MG Tab, Take  mg by mouth every four hours as needed., Disp: , Rfl:   •  duloxetine (CYMBALTA) 30 MG Cap DR Particles, 30 mg every day. calms her down with pain, Disp: , Rfl:   •  aripiprazole (ABILIFY) 10 MG Tab, Take 10 mg by mouth every day., Disp: , Rfl:   ALLERGIES:   Allergies   Allergen Reactions   • Penicillins      As child- has a lot of pcn- uncertain about reaction.    • Nicoderm [Nicotine] Rash     Rash where patch was placed.      SURGHX:   Past Surgical History:   Procedure Laterality Date   • STAR BY LAPAROSCOPY Bilateral 5/21/2016    Procedure: STAR BY LAPAROSCOPY;  Surgeon: Hany Encinas M.D.;  Location: SURGERY [facility];  Service:    • APPENDECTOMY     • OTHER  2002, 2005     "neck surgery   • OTHER      , took out ovary or cyst   • OTHER CARDIAC SURGERY      open heart in 1950- PDA     SOCHX:  reports that she has been smoking Cigarettes.  She started smoking about 20 years ago. She has a 7.50 pack-year smoking history. She has never used smokeless tobacco. She reports that she does not drink alcohol or use drugs.  FH: Family history was reviewed, no pertinent findings to report  Medications, Allergies, and current problem list reviewed today in Epic       Objective:     /82 (BP Location: Right arm, Patient Position: Sitting, BP Cuff Size: Adult)   Pulse 72   Temp 36.8 °C (98.2 °F) (Temporal)   Resp 12   Ht 1.702 m (5' 7\")   Wt 94.8 kg (209 lb)   SpO2 94%   BMI 32.73 kg/m²      Physical Exam   Constitutional: She is oriented to person, place, and time. She appears well-developed and well-nourished. She is active.  Non-toxic appearance. She does not have a sickly appearance. She does not appear ill. No distress. She is not intubated.   HENT:   Head: Normocephalic and atraumatic.   Right Ear: Hearing, tympanic membrane, external ear and ear canal normal.   Left Ear: Hearing, tympanic membrane, external ear and ear canal normal.   Nose: Nose normal.   Mouth/Throat: Uvula is midline, oropharynx is clear and moist and mucous membranes are normal.   Eyes: Conjunctivae, EOM and lids are normal.   Neck: Normal range of motion and full passive range of motion without pain. Neck supple.   Cardiovascular: Normal rate, regular rhythm, S1 normal, S2 normal and normal heart sounds.  Exam reveals no gallop and no friction rub.    No murmur heard.  Pulmonary/Chest: Effort normal. No accessory muscle usage. No apnea, no tachypnea and no bradypnea. She is not intubated. No respiratory distress. She has no decreased breath sounds. She has wheezes. She has no rhonchi. She has no rales. She exhibits no tenderness.   Musculoskeletal: Normal range of motion.   Neurological: She is alert " and oriented to person, place, and time.   Skin: Skin is warm and dry.   Psychiatric: She has a normal mood and affect. Her speech is normal and behavior is normal. Judgment and thought content normal.   Vitals reviewed.              Assessment/Plan:     1. COPD exacerbation (HCC)    - doxycycline (VIBRAMYCIN) 100 MG Cap; Take 1 Cap by mouth 2 times a day for 7 days.  Dispense: 14 Each; Refill: 0  - MethylPREDNISolone (MEDROL DOSEPAK) 4 MG Tablet Therapy Pack; Take as directed on package.  Dispense: 1 Kit; Refill: 0  - guaifenesin-codeine (CHERATUSSIN AC) Solution oral solution; Take 5 mL by mouth every four hours as needed for Cough for up to 7 days.  Dispense: 120 mL; Refill: 0    Differential diagnosis, natural history, supportive care discussed. Follow-up with primary care provider within 7-10 days, emergency room precautions discussed.  Patient and/or family appears understanding of information.  Handout and review of patients diagnosis and treatment was discussed extensively.

## 2019-04-12 ENCOUNTER — OFFICE VISIT (OUTPATIENT)
Dept: MEDICAL GROUP | Age: 64
End: 2019-04-12
Payer: MEDICARE

## 2019-04-12 VITALS
TEMPERATURE: 98.8 F | OXYGEN SATURATION: 92 % | BODY MASS INDEX: 32.52 KG/M2 | HEART RATE: 78 BPM | WEIGHT: 207.2 LBS | HEIGHT: 67 IN | SYSTOLIC BLOOD PRESSURE: 126 MMHG | DIASTOLIC BLOOD PRESSURE: 84 MMHG

## 2019-04-12 DIAGNOSIS — Z00.00 ANNUAL PHYSICAL EXAM: ICD-10-CM

## 2019-04-12 DIAGNOSIS — E03.4 HYPOTHYROIDISM DUE TO ACQUIRED ATROPHY OF THYROID: ICD-10-CM

## 2019-04-12 DIAGNOSIS — Z12.2 ENCOUNTER FOR SCREENING FOR MALIGNANT NEOPLASM OF RESPIRATORY ORGANS: ICD-10-CM

## 2019-04-12 DIAGNOSIS — L82.0 INFLAMED SEBORRHEIC KERATOSIS: ICD-10-CM

## 2019-04-12 DIAGNOSIS — J41.1 MUCOPURULENT CHRONIC BRONCHITIS (HCC): ICD-10-CM

## 2019-04-12 DIAGNOSIS — R91.1 PULMONARY NODULE: ICD-10-CM

## 2019-04-12 DIAGNOSIS — F31.9 BIPOLAR 1 DISORDER (HCC): ICD-10-CM

## 2019-04-12 DIAGNOSIS — J96.11 CHRONIC HYPOXEMIC RESPIRATORY FAILURE (HCC): ICD-10-CM

## 2019-04-12 DIAGNOSIS — L57.0 AK (ACTINIC KERATOSIS): ICD-10-CM

## 2019-04-12 PROCEDURE — 99214 OFFICE O/P EST MOD 30 MIN: CPT | Performed by: FAMILY MEDICINE

## 2019-04-12 ASSESSMENT — PAIN SCALES - GENERAL: PAINLEVEL: NO PAIN

## 2019-04-12 NOTE — ASSESSMENT & PLAN NOTE
The patient has been compliant with her Synthroid 75 mcg p.o. every 12 hours.    denies any new fatigue, cold/heat intolerance, weight gain/weight loss, diarrhea/constipation, dry skin, myalgia, depressed mood, palpitations, tremmor, hair loss, and no goiter.

## 2019-04-12 NOTE — ASSESSMENT & PLAN NOTE
Symbicort 2 puffs daily  Albuterol as needed  Up-to-date on pneumonia and flu vaccinations    Patient denies any current cough no chest pain or shortness of breath at rest.  She does have baseline dyspnea on exertion of walking upstairs.  She states she is able to do it but she has to go really slow.  She is due for repeat pulmonary function testing.

## 2019-04-12 NOTE — ASSESSMENT & PLAN NOTE
Duloxetine 30 mg p.o. every 24 hours  Abilify 10 mg p.o. every 24    Meet with psychiatry every 6 months.    Bipolar Screen:          Denied all of the following         -  inflated self esteem or grandiosity, decreased need for sleep, more talkative than usual, racing thoughts or flight of ideas, distractibility, increase in goal-directed activity, excessive involvement in pleasurable activities that have a high potential for painful consequences, such as spending money or sexual indiscretion.

## 2019-04-12 NOTE — PROGRESS NOTES
This medical record contains text that has been entered with the assistance of computer voice recognition and dictation software.  Therefore, it may contain unintended errors in text, spelling, punctuation, or grammar    Chief Complaint   Patient presents with   • Anxiety     6 mo FV         Alex Shepard is a 63 y.o. female here evaluation and management of: annual      HPI:     COPD (chronic obstructive pulmonary disease)  Symbicort 2 puffs daily  Albuterol as needed  Up-to-date on pneumonia and flu vaccinations    Patient denies any current cough no chest pain or shortness of breath at rest.  She does have baseline dyspnea on exertion of walking upstairs.  She states she is able to do it but she has to go really slow.  She is due for repeat pulmonary function testing.      Bipolar 1 disorder (HCC)  Duloxetine 30 mg p.o. every 24 hours  Abilify 10 mg p.o. every 24    Meet with psychiatry every 6 months.    Bipolar Screen:          Denied all of the following         -  inflated self esteem or grandiosity, decreased need for sleep, more talkative than usual, racing thoughts or flight of ideas, distractibility, increase in goal-directed activity, excessive involvement in pleasurable activities that have a high potential for painful consequences, such as spending money or sexual indiscretion.      Hypothyroidism due to acquired atrophy of thyroid  The patient has been compliant with her Synthroid 75 mcg p.o. every 12 hours.    denies any new fatigue, cold/heat intolerance, weight gain/weight loss, diarrhea/constipation, dry skin, myalgia, depressed mood, palpitations, tremmor, hair loss, and no goiter.      Pulmonary nodule  The patient is due for repeat CT scan for her pulmonary nodules.  She states that she did not have a chance to do yet she needs a new order.    Impression       1.  1.6 cm nodule infiltration right lower lobe, 4.8 mm nodular infiltrate right lower lobe, and 2.7 mm nodule right upper lobe  unchanged since 6/17/2014. 2 years stability suggests benignity.  2.  Hyperexpanded and emphysematous lungs. No pleural effusions.  3.  Postoperative changes posterior dome right lobe of the liver appear unchanged.       Reading Provider Reading Date   Steve Eli M.D. Sep 12, 2016   Signing Provider Signing Date Signing Time   Steve Eli M.D. Sep 12, 2016 11:14 AM   Lab Information     Lab   RADIOLOGY                        Current medicines (including changes today)  Current Outpatient Prescriptions   Medication Sig Dispense Refill   • BACLOFEN PO Take  by mouth.     • albuterol (PROAIR HFA) 108 (90 Base) MCG/ACT Aero Soln inhalation aerosol Inhale 2 Puffs by mouth every 6 hours as needed for Shortness of Breath. 8.5 g 3   • levothyroxine (SYNTHROID) 75 MCG Tab TAKE ONE TABLET BY MOUTH IN THE MORNING ON  AN  EMPTY  STOMACH 90 Tab 0   • nystatin (MYCOSTATIN) powder Apply 1 g to affected area(s) 4 times a day. 15 g 3   • DILTIAZem CD (CARDIZEM CD) 180 MG CAPSULE SR 24 HR Take 180 mg by mouth every day.     • calcium-vitamin D (OSCAL 500 +D) 500-200 MG-UNIT Tab Take 1 Tab by mouth 2 times a day, with meals. 360 Tab 0   • triamcinolone acetonide (KENALOG) 0.1 % Ointment Aaa bid x 14 days then stop 1 Tube 1   • ibuprofen (MOTRIN) 600 MG Tab Take 600 mg by mouth every 6 hours as needed.     • tramadol (ULTRAM) 50 MG Tab Take  mg by mouth every four hours as needed.     • duloxetine (CYMBALTA) 30 MG Cap DR Particles 30 mg every day. calms her down with pain     • aripiprazole (ABILIFY) 10 MG Tab Take 10 mg by mouth every day.     • predniSONE (DELTASONE) 20 MG Tab 1 tab TID x 2 days, then 1 tab BID x 5 days, then 1 tab QD x 5 (Patient not taking: Reported on 4/12/2019) 21 Tab 0   • budesonide-formoterol (SYMBICORT) 160-4.5 MCG/ACT Aerosol Inhale 2 Puffs by mouth. Rinse mouth after each use.       No current facility-administered medications for this visit.      She  has a past medical history of Anxiety; ASTHMA; Back  "pain; Bipolar affect, depressed (HCC); Bronchitis; Cholelithiasis; Chronic airway obstruction, not elsewhere classified; Cigarette smoker one half pack a day or less; Depression; H/O cervical spine surgery (2002); Hyponatremia; Pneumonia; Psychiatric disorder (); Psychosis (); PTSD (post-traumatic stress disorder); Pulmonary nodule; S/P appendectomy (); and S/P repair of patent ductus arteriosus ().  She  has a past surgical history that includes other cardiac surgery; other (, ); other; appendectomy; and cristofer by laparoscopy (Bilateral, 2016).  Social History   Substance Use Topics   • Smoking status: Current Some Day Smoker     Packs/day: 0.50     Years: 15.00     Types: Cigarettes     Start date: 1999     Last attempt to quit: 2015   • Smokeless tobacco: Never Used      Comment: 2 packs a week   • Alcohol use No      Comment: occas glass of wine, Agrees to stop use     Social History     Social History Narrative    , 2 children.      Family History   Problem Relation Age of Onset   • Psychiatry Mother         depression   • Other Mother         TIA   • Diabetes Mother    • Psychiatry Sister         depression   • Cancer Father         prostate;skin     Family Status   Relation Status   • Mo  at age 91   • Sis    • Fa  at age 86         ROS    Please see hpi     All other systems reviewed and are negative     Objective:     /84   Pulse 78   Temp 37.1 °C (98.8 °F) (Temporal)   Ht 1.702 m (5' 7\")   Wt 94 kg (207 lb 3.2 oz)   SpO2 92%  Body mass index is 32.45 kg/m².  Physical Exam:    Constitutional: Alert, no distress.  Skin: Warm, dry, good turgor, no rashes in visible areas  Eye: Equal, round and reactive, conjunctiva clear, lids normal.  ENMT: Lips without lesions, good dentition, oropharynx clear.  Neck: Trachea midline, no masses, no thyromegaly. No cervical or supraclavicular lymphadenopathy.  Respiratory: Unlabored " respiratory effort, lungs clear to auscultation, no wheezes, no ronchi.  Cardiovascular: Normal S1, S2, no murmur, no edema  Abdomen: Soft, non-tender, no masses, no hepatosplenomegaly.  Psych: Alert and oriented x3, normal affect and mood.  Lamont Chappelln - Benign  Date/Time: 4/12/2019 1:05 PM  Performed by: RELL HALL  Authorized by: RELL HALL     Number of Lesions: 2  Lesion 1:     Body area: trunk    Trunk location: back    Malignancy: benign lesion      Destruction method: cryotherapy    Lesion 2:     Body area: trunk    Trunk location: back    Malignancy: benign lesion      Destruction method: cryotherapy    Lamont Destn - Pre-malignant  Date/Time: 4/12/2019 1:06 PM  Performed by: RELL HALL  Authorized by: RELL HALL     Number of Lesions: 2  Lesion 1:     Body area: trunk    Trunk location: chest    Malignancy: malignancy unknown      Destruction method: cryotherapy    Lesion 2:     Body area: trunk    Trunk location: back    Malignancy: malignancy unknown      Destruction method: cryotherapy      Comment:   ISK--patient states that these raised plaques are very flaky, 0.2 cm to 5 cm on the and arms.            Assessment and Plan:   The following treatment plan was discussed      1. Hypothyroidism due to acquired atrophy of thyroid  We will obtain new labs to update clinical profile.  Then we will adjust therapy as needed.    - TSH WITH REFLEX TO FT4    2. Annual physical exam  We will obtain new labs to update clinical profile.  Then we will adjust therapy as needed.    - CBC WITHOUT DIFFERENTIAL; Future  - Comp Metabolic Panel; Future  - Lipid Profile; Future    3. Bipolar 1 disorder (HCC)  Patient has been stable with current management  We will make no changes for now      4. Chronic hypoxemic respiratory failure (HCC)  Up to date on vaccinations  Counseled on the following   smoking cessation  avoidance of various other inhalational  particulate exposures,   healthy diet and aim to achieve a normal body mass index (BMI).  Continue LABA plus inhaled corticosteroid, LAMA, anticholinergics    - PULMONARY FUNCTION TESTS -Test requested: Complete Pulmonary Function Test; Future    5. Encounter for screening for malignant neoplasm of respiratory organs  Due for repeat CT for pulmonary nodules, ordered      6. AK (actinic keratosis)  Patient tollerrated procedure well  There were no adverse events  Patient was given post procedure precautions       7. Inflamed seborrheic keratosis  Patient tollerrated procedure well  There were no adverse events  Patient was given post procedure precautions           Instructed to Follow up in clinic or ER for worsening symptoms, difficulty breathing, lack of expected recovery, or should new symptoms or problems arise.    Followup: Return in about 2 weeks (around 4/26/2019) for EXCISION.       Once again this medical record contains text that has been entered with the assistance of computer voice recognition and dictation software.  Therefore, it may contain unintended errors in text, spelling, punctuation, or grammar

## 2019-04-12 NOTE — ASSESSMENT & PLAN NOTE
The patient is due for repeat CT scan for her pulmonary nodules.  She states that she did not have a chance to do yet she needs a new order.    Impression       1.  1.6 cm nodule infiltration right lower lobe, 4.8 mm nodular infiltrate right lower lobe, and 2.7 mm nodule right upper lobe unchanged since 6/17/2014. 2 years stability suggests benignity.  2.  Hyperexpanded and emphysematous lungs. No pleural effusions.  3.  Postoperative changes posterior dome right lobe of the liver appear unchanged.       Reading Provider Reading Date   Steve Eli M.D. Sep 12, 2016   Signing Provider Signing Date Signing Time   Steve Eli M.D. Sep 12, 2016 11:14 AM   Lab Information     Lab   RADIOLOGY

## 2019-04-18 ENCOUNTER — HOSPITAL ENCOUNTER (OUTPATIENT)
Dept: LAB | Facility: MEDICAL CENTER | Age: 64
End: 2019-04-18
Attending: FAMILY MEDICINE
Payer: MEDICARE

## 2019-04-18 DIAGNOSIS — Z00.00 ANNUAL PHYSICAL EXAM: ICD-10-CM

## 2019-04-18 LAB
ALBUMIN SERPL BCP-MCNC: 4 G/DL (ref 3.2–4.9)
ALBUMIN/GLOB SERPL: 1.5 G/DL
ALP SERPL-CCNC: 44 U/L (ref 30–99)
ALT SERPL-CCNC: 13 U/L (ref 2–50)
ANION GAP SERPL CALC-SCNC: 4 MMOL/L (ref 0–11.9)
AST SERPL-CCNC: 16 U/L (ref 12–45)
BILIRUB SERPL-MCNC: 0.8 MG/DL (ref 0.1–1.5)
BUN SERPL-MCNC: 13 MG/DL (ref 8–22)
CALCIUM SERPL-MCNC: 9.3 MG/DL (ref 8.5–10.5)
CHLORIDE SERPL-SCNC: 101 MMOL/L (ref 96–112)
CHOLEST SERPL-MCNC: 142 MG/DL (ref 100–199)
CO2 SERPL-SCNC: 33 MMOL/L (ref 20–33)
CREAT SERPL-MCNC: 0.89 MG/DL (ref 0.5–1.4)
ERYTHROCYTE [DISTWIDTH] IN BLOOD BY AUTOMATED COUNT: 54.2 FL (ref 35.9–50)
FASTING STATUS PATIENT QL REPORTED: NORMAL
GLOBULIN SER CALC-MCNC: 2.7 G/DL (ref 1.9–3.5)
GLUCOSE SERPL-MCNC: 89 MG/DL (ref 65–99)
HCT VFR BLD AUTO: 51 % (ref 37–47)
HDLC SERPL-MCNC: 59 MG/DL
HGB BLD-MCNC: 16.2 G/DL (ref 12–16)
LDLC SERPL CALC-MCNC: 65 MG/DL
MCH RBC QN AUTO: 32.5 PG (ref 27–33)
MCHC RBC AUTO-ENTMCNC: 31.8 G/DL (ref 33.6–35)
MCV RBC AUTO: 102.2 FL (ref 81.4–97.8)
PLATELET # BLD AUTO: 213 K/UL (ref 164–446)
PMV BLD AUTO: 9.8 FL (ref 9–12.9)
POTASSIUM SERPL-SCNC: 4.2 MMOL/L (ref 3.6–5.5)
PROT SERPL-MCNC: 6.7 G/DL (ref 6–8.2)
RBC # BLD AUTO: 4.99 M/UL (ref 4.2–5.4)
SODIUM SERPL-SCNC: 138 MMOL/L (ref 135–145)
TRIGL SERPL-MCNC: 90 MG/DL (ref 0–149)
WBC # BLD AUTO: 5.9 K/UL (ref 4.8–10.8)

## 2019-04-18 PROCEDURE — 80053 COMPREHEN METABOLIC PANEL: CPT | Mod: GY

## 2019-04-18 PROCEDURE — 80061 LIPID PANEL: CPT | Mod: GY

## 2019-04-18 PROCEDURE — 85027 COMPLETE CBC AUTOMATED: CPT | Mod: GY

## 2019-04-18 PROCEDURE — 36415 COLL VENOUS BLD VENIPUNCTURE: CPT | Mod: GY

## 2019-04-26 ENCOUNTER — OFFICE VISIT (OUTPATIENT)
Dept: MEDICAL GROUP | Age: 64
End: 2019-04-26
Payer: MEDICARE

## 2019-04-26 ENCOUNTER — HOSPITAL ENCOUNTER (OUTPATIENT)
Dept: LAB | Facility: MEDICAL CENTER | Age: 64
End: 2019-04-26
Attending: FAMILY MEDICINE
Payer: MEDICARE

## 2019-04-26 VITALS
BODY MASS INDEX: 30.77 KG/M2 | SYSTOLIC BLOOD PRESSURE: 114 MMHG | TEMPERATURE: 97.8 F | HEIGHT: 68 IN | OXYGEN SATURATION: 100 % | DIASTOLIC BLOOD PRESSURE: 62 MMHG | HEART RATE: 60 BPM | WEIGHT: 203 LBS

## 2019-04-26 DIAGNOSIS — D48.9 NEOPLASM OF UNCERTAIN BEHAVIOR: ICD-10-CM

## 2019-04-26 LAB — PATHOLOGY CONSULT NOTE: NORMAL

## 2019-04-26 PROCEDURE — 99000 SPECIMEN HANDLING OFFICE-LAB: CPT | Performed by: FAMILY MEDICINE

## 2019-04-26 PROCEDURE — 88305 TISSUE EXAM BY PATHOLOGIST: CPT

## 2019-04-26 PROCEDURE — 11401 EXC TR-EXT B9+MARG 0.6-1 CM: CPT | Performed by: FAMILY MEDICINE

## 2019-04-26 NOTE — PROGRESS NOTES
This medical record contains text that has been entered with the assistance of computer voice recognition and dictation software.  Therefore, it may contain unintended errors in text, spelling, punctuation, or grammar    Chief Complaint   Patient presents with   • Nevus         Alex Shepard is a 63 y.o. female here evaluation and management of: mole      HPI:     Neoplasm of uncertain behavior  Patient is a 63-year-old female who presents to clinic with a new dark irregular macule on her left shoulder.  It is flaky itchy, often bleeds.    Current medicines (including changes today)  Current Outpatient Prescriptions   Medication Sig Dispense Refill   • BACLOFEN PO Take  by mouth.     • levothyroxine (SYNTHROID) 75 MCG Tab TAKE ONE TABLET BY MOUTH IN THE MORNING ON  AN  EMPTY  STOMACH 90 Tab 0   • nystatin (MYCOSTATIN) powder Apply 1 g to affected area(s) 4 times a day. 15 g 3   • DILTIAZem CD (CARDIZEM CD) 180 MG CAPSULE SR 24 HR Take 180 mg by mouth every day.     • calcium-vitamin D (OSCAL 500 +D) 500-200 MG-UNIT Tab Take 1 Tab by mouth 2 times a day, with meals. 360 Tab 0   • triamcinolone acetonide (KENALOG) 0.1 % Ointment Aaa bid x 14 days then stop 1 Tube 1   • ibuprofen (MOTRIN) 600 MG Tab Take 600 mg by mouth every 6 hours as needed.     • tramadol (ULTRAM) 50 MG Tab Take  mg by mouth every four hours as needed.     • duloxetine (CYMBALTA) 30 MG Cap DR Particles 30 mg every day. calms her down with pain     • aripiprazole (ABILIFY) 10 MG Tab Take 10 mg by mouth every day.     • albuterol (PROAIR HFA) 108 (90 Base) MCG/ACT Aero Soln inhalation aerosol Inhale 2 Puffs by mouth every 6 hours as needed for Shortness of Breath. 8.5 g 3   • predniSONE (DELTASONE) 20 MG Tab 1 tab TID x 2 days, then 1 tab BID x 5 days, then 1 tab QD x 5 (Patient not taking: Reported on 4/12/2019) 21 Tab 0   • budesonide-formoterol (SYMBICORT) 160-4.5 MCG/ACT Aerosol Inhale 2 Puffs by mouth. Rinse mouth after each use.    "    No current facility-administered medications for this visit.      She  has a past medical history of Anxiety; ASTHMA; Back pain; Bipolar affect, depressed (HCC); Bronchitis; Cholelithiasis; Chronic airway obstruction, not elsewhere classified; Cigarette smoker one half pack a day or less; Depression; H/O cervical spine surgery (2002); Hyponatremia; Pneumonia; Psychiatric disorder (); Psychosis (); PTSD (post-traumatic stress disorder); Pulmonary nodule; S/P appendectomy (); and S/P repair of patent ductus arteriosus ().  She  has a past surgical history that includes other cardiac surgery; other (, ); other; appendectomy; and cristofer by laparoscopy (Bilateral, 2016).  Social History   Substance Use Topics   • Smoking status: Current Some Day Smoker     Packs/day: 0.50     Years: 15.00     Types: Cigarettes     Start date: 1999     Last attempt to quit: 2015   • Smokeless tobacco: Never Used      Comment: 2 packs a week   • Alcohol use No      Comment: occas glass of wine, Agrees to stop use     Social History     Social History Narrative    , 2 children.      Family History   Problem Relation Age of Onset   • Psychiatry Mother         depression   • Other Mother         TIA   • Diabetes Mother    • Psychiatry Sister         depression   • Cancer Father         prostate;skin     Family Status   Relation Status   • Mo  at age 91   • Sis    • Fa  at age 86         ROS    Please see hpi     All other systems reviewed and are negative     Objective:     /62 (BP Location: Left arm, Patient Position: Sitting, BP Cuff Size: Adult long)   Pulse 60   Temp 36.6 °C (97.8 °F) (Temporal)   Ht 1.727 m (5' 8\")   Wt 92.1 kg (203 lb)   SpO2 100%  Body mass index is 30.87 kg/m².  Physical Exam:    Constitutional: Alert, no distress.  Skin:     Excision--- 3mm, irregular, assymmetric, hyperpigmented macule located on left shoulder    Dermoscopy " revealed  Pigmented networks,    Final Length of Excision--6mm      After informed written consent was obtained, using Betadine for cleansing and 1% Lidocaine w- epinephrine for anesthetic, with sterile technique a 6 mm punch tool was used to obtain and excise  the lesion through dermis (full thickness) . Intent was to remove entire lesion with margins . Hemostasis was obtained by pressure and wound was   Sutured  With 3.0 Ethilon x1 .  Antibiotic dressing is applied, and wound care instructions provided. Be alert for any signs of cutaneous infection. The specimen is labeled and sent to pathology for evaluation. The procedure was well tolerated without complications.          Assessment and Plan:   The following treatment plan was discussed      1. Neoplasm of uncertain behavior    Patient tollerrated procedure well  There were no adverse events  Patient was given post procedure precautions     - Pathology Specimen; Future      Instructed to Follow up in clinic or ER for worsening symptoms, difficulty breathing, lack of expected recovery, or should new symptoms or problems arise.    Followup: Return in about 10 days (around 5/6/2019) for Suture Removal with MA.       Once again this medical record contains text that has been entered with the assistance of computer voice recognition and dictation software.  Therefore, it may contain unintended errors in text, spelling, punctuation, or grammar

## 2019-04-26 NOTE — ASSESSMENT & PLAN NOTE
Patient is a 63-year-old female who presents to clinic with a new dark irregular macule on her left shoulder.  It is flaky itchy, often bleeds.

## 2019-05-06 ENCOUNTER — NON-PROVIDER VISIT (OUTPATIENT)
Dept: MEDICAL GROUP | Age: 64
End: 2019-05-06
Payer: MEDICARE

## 2019-05-06 NOTE — PROGRESS NOTES
Alex Shepard is a 63 y.o. female here for a Non-Provider Visit for Suture Removal.    Sutures were placed by Dr. Raza on date: 4/26/19  Skin is healed: Yes  Provider notified if skin is not healed, or if there is redness, heat, pain, or drainage from incision: N\A  Sutures removed.   Mastisol and steristips are placed: No    Advised to use emollient (vaseline, aquaphor, etc.) as needed, avoid peroxide and antibiotic ointment to reduce irritation.     Path report has been reviewed by provider.  Path report has been reviewed with patient.

## 2019-05-20 ENCOUNTER — HOSPITAL ENCOUNTER (OUTPATIENT)
Dept: PULMONOLOGY | Facility: MEDICAL CENTER | Age: 64
End: 2019-05-20
Attending: FAMILY MEDICINE
Payer: MEDICARE

## 2019-05-20 DIAGNOSIS — J96.11 CHRONIC HYPOXEMIC RESPIRATORY FAILURE (HCC): ICD-10-CM

## 2019-05-20 PROCEDURE — 94726 PLETHYSMOGRAPHY LUNG VOLUMES: CPT

## 2019-05-20 PROCEDURE — 94729 DIFFUSING CAPACITY: CPT

## 2019-05-20 PROCEDURE — 94060 EVALUATION OF WHEEZING: CPT

## 2019-05-20 ASSESSMENT — PULMONARY FUNCTION TESTS
FEV1/FVC_PERCENT_PREDICTED: 74
FEV1/FVC: 56
FEV1: .83
FEV1/FVC_PERCENT_PREDICTED: 72
FVC_PREDICTED: 3.42
FEV1/FVC_PREDICTED: 78
FEV1_PERCENT_CHANGE: 12
FEV1/FVC_PERCENT_PREDICTED: 77
FEV1_PREDICTED: 2.64
FEV1/FVC_PERCENT_PREDICTED: 71
FEV1/FVC_PERCENT_CHANGE: -3
FVC: 1.43
FEV1_LLN: 2.20
FEV1/FVC_PERCENT_LLN: 65
FVC_LLN: 2.86
FEV1/FVC_PERCENT_PREDICTED: 76
FVC_PERCENT_PREDICTED: 41
FEV1_PERCENT_CHANGE: 8
FEV1_PERCENT_PREDICTED: 34
FVC_PERCENT_PREDICTED: 47
FVC_LLN: 2.86
FEV1/FVC: 58
FEV1/FVC: 58
FEV1/FVC_PERCENT_CHANGE: 67
FEV1/FVC: 55.9
FEV1/FVC_PERCENT_LLN: 65
FEV1_PERCENT_PREDICTED: 31
FVC: 1.61
FEV1_LLN: 2.20
FEV1: .9

## 2019-05-23 NOTE — PROCEDURES
PULMONARY FUNCTION TEST    DATE OF TEST:  05/20/2019    SPIROMETRY:  Showed severely decreased FVC to 47% of predicted.  FEV1 was also   severely decreased to 54% of predicted.  FEV1/FVC ratio was consistent with   obstruction at 56%.  There was no response to bronchodilators.  Flow volume   loop was consistent with mixed ventilatory defect.      LUNG VOLUMES:  Showed decreased total lung capacity to 71% of predicted   consistent with mild restriction.      Diffusion capacity was also decreased to 63% of predicted.     IMPRESSION:  The patient has mixed restrictive and obstructive ventilatory   defect.  Obstruction can be seen in chronic obstructive pulmonary disease,   asthma, and other diseases.  Restriction can be seen in interstitial lung   disease, chest wall abnormalities including the diaphragm.  Clinical   correlation is required.       ____________________________________     MD LIVAN Salinas / MERCEDEZ    DD:  05/23/2019 07:23:29  DT:  05/23/2019 07:41:46    D#:  [id number]  Job#:  [id number]

## 2019-05-27 PROCEDURE — 94729 DIFFUSING CAPACITY: CPT | Mod: 26 | Performed by: INTERNAL MEDICINE

## 2019-05-27 PROCEDURE — 94060 EVALUATION OF WHEEZING: CPT | Mod: 26 | Performed by: INTERNAL MEDICINE

## 2019-05-27 PROCEDURE — 94726 PLETHYSMOGRAPHY LUNG VOLUMES: CPT | Mod: 26 | Performed by: INTERNAL MEDICINE

## 2019-07-31 ENCOUNTER — OFFICE VISIT (OUTPATIENT)
Dept: MEDICAL GROUP | Age: 64
End: 2019-07-31
Payer: MEDICARE

## 2019-07-31 VITALS
HEART RATE: 74 BPM | OXYGEN SATURATION: 98 % | TEMPERATURE: 98.9 F | BODY MASS INDEX: 31.99 KG/M2 | DIASTOLIC BLOOD PRESSURE: 78 MMHG | SYSTOLIC BLOOD PRESSURE: 124 MMHG | HEIGHT: 67 IN | WEIGHT: 203.8 LBS

## 2019-07-31 DIAGNOSIS — J41.8 MIXED SIMPLE AND MUCOPURULENT CHRONIC BRONCHITIS (HCC): ICD-10-CM

## 2019-07-31 DIAGNOSIS — Z87.891 SMOKING HISTORY: ICD-10-CM

## 2019-07-31 DIAGNOSIS — B37.2 CANDIDAL INTERTRIGO: ICD-10-CM

## 2019-07-31 PROCEDURE — 99214 OFFICE O/P EST MOD 30 MIN: CPT | Performed by: FAMILY MEDICINE

## 2019-07-31 RX ORDER — ALBUTEROL SULFATE 90 UG/1
2 AEROSOL, METERED RESPIRATORY (INHALATION) EVERY 6 HOURS PRN
Qty: 8.5 G | Refills: 3 | Status: SHIPPED | OUTPATIENT
Start: 2019-07-31 | End: 2022-07-01 | Stop reason: SDUPTHER

## 2019-07-31 ASSESSMENT — PAIN SCALES - GENERAL: PAINLEVEL: NO PAIN

## 2019-07-31 NOTE — PROGRESS NOTES
This medical record contains text that has been entered with the assistance of computer voice recognition and dictation software.  Therefore, it may contain unintended errors in text, spelling, punctuation, or grammar        Chief Complaint   Patient presents with   • Nicotine Dependence     trying to quit smoking         Alex Shepard is a 64 y.o. female here evaluation and management of: Needs assistance in quitting smoking      HPI:     1. Smoking history  The patient states that she is definitely ready to stop smoking.  She states the reason is because it is becoming more difficult to breathe.  She has tried to stop in the past but was unsuccessful.  She states that she is allergic to nicotine patches.  She has not tried Chantix yet.  She is already taking duloxetine and Abilify so is not a good candidate for Wellbutrin.  Patient denies any cough today, no hemoptysis, no unintentional weight loss, no generalized malaise or main issues the shortness of breath.        2. Mixed simple and mucopurulent chronic bronchitis (HCC)  Patient is currently on Symbicort 160/4.5 mg  She also uses albuterol nebulizing solution as needed    She continues to have shortness of breath and dyspnea on exertion.  He does suffer from frequent bronchitis and is given antibiotics when neededs    PULMONARY FUNCTION TEST     DATE OF TEST:  05/20/2019     SPIROMETRY:  Showed severely decreased FVC to 47% of predicted.  FEV1 was also   severely decreased to 54% of predicted.  FEV1/FVC ratio was consistent with   obstruction at 56%.  There was no response to bronchodilators.  Flow volume   loop was consistent with mixed ventilatory defect.       LUNG VOLUMES:  Showed decreased total lung capacity to 71% of predicted   consistent with mild restriction.       Diffusion capacity was also decreased to 63% of predicted.      IMPRESSION:  The patient has mixed restrictive and obstructive ventilatory   defect.  Obstruction can be seen in chronic  obstructive pulmonary disease,   asthma, and other diseases.  Restriction can be seen in interstitial lung   disease, chest wall abnormalities including the diaphragm.  Clinical   correlation is required.        ____________________________________     MD LIVAN Salinas / MERCEDEZ     DD:  05/23/2019 07:23:29  3. Candidal intertrigo  Overall the patient states that the nystatin powder well.  She continues to use this once daily 4 times per week to prevent recurrence of the rash.      Current medicines (including changes today)  Current Outpatient Medications   Medication Sig Dispense Refill   • varenicline (CHANTIX STARTING MONTH JENNY) 0.5 MG X 11 & 1 MG X 42 tablet Days 1 to 3: 0.5 mg once daily Days 4 to 7: 0.5 mg twice daily Maintenance (= Day 8): 1 mg twice daily for 11 weeks; 56 Tab 3   • albuterol (PROVENTIL) 2.5mg/0.5ml Nebu Soln 0.5 mL by Nebulization route every four hours as needed for Shortness of Breath. 2 Bottle 3   • albuterol (PROAIR HFA) 108 (90 Base) MCG/ACT Aero Soln inhalation aerosol Inhale 2 Puffs by mouth every 6 hours as needed for Shortness of Breath. 8.5 g 3   • BACLOFEN PO Take  by mouth.     • predniSONE (DELTASONE) 20 MG Tab 1 tab TID x 2 days, then 1 tab BID x 5 days, then 1 tab QD x 5 21 Tab 0   • levothyroxine (SYNTHROID) 75 MCG Tab TAKE ONE TABLET BY MOUTH IN THE MORNING ON  AN  EMPTY  STOMACH 90 Tab 0   • nystatin (MYCOSTATIN) powder Apply 1 g to affected area(s) 4 times a day. 15 g 3   • DILTIAZem CD (CARDIZEM CD) 180 MG CAPSULE SR 24 HR Take 180 mg by mouth every day.     • calcium-vitamin D (OSCAL 500 +D) 500-200 MG-UNIT Tab Take 1 Tab by mouth 2 times a day, with meals. 360 Tab 0   • triamcinolone acetonide (KENALOG) 0.1 % Ointment Aaa bid x 14 days then stop 1 Tube 1   • budesonide-formoterol (SYMBICORT) 160-4.5 MCG/ACT Aerosol Inhale 2 Puffs by mouth. Rinse mouth after each use.     • ibuprofen (MOTRIN) 600 MG Tab Take 600 mg by mouth every 6 hours as needed.     • tramadol  (ULTRAM) 50 MG Tab Take  mg by mouth every four hours as needed.     • duloxetine (CYMBALTA) 30 MG Cap DR Particles 30 mg every day. calms her down with pain     • aripiprazole (ABILIFY) 10 MG Tab Take 10 mg by mouth every day.       No current facility-administered medications for this visit.      She  has a past medical history of Anxiety, ASTHMA, Back pain, Bipolar affect, depressed ([facility]), Bronchitis, Cholelithiasis, Chronic airway obstruction, not elsewhere classified, Cigarette smoker one half pack a day or less, Depression, H/O cervical spine surgery (2002), Hyponatremia, Pneumonia, Psychiatric disorder (), Psychosis (), PTSD (post-traumatic stress disorder), Pulmonary nodule, S/P appendectomy (), and S/P repair of patent ductus arteriosus ().  She  has a past surgical history that includes other cardiac surgery; other (, ); other; appendectomy; and cristofer by laparoscopy (Bilateral, 2016).  Social History     Tobacco Use   • Smoking status: Current Every Day Smoker     Packs/day: 0.50     Years: 15.00     Pack years: 7.50     Types: Cigarettes     Start date: 1999   • Smokeless tobacco: Never Used   • Tobacco comment: 2.5 packs a week   Substance Use Topics   • Alcohol use: No     Alcohol/week: 0.0 oz     Comment: occas glass of wine, Agrees to stop use   • Drug use: No     Social History     Social History Narrative    , 2 children.      Family History   Problem Relation Age of Onset   • Psychiatric Illness Mother         depression   • Other Mother         TIA   • Diabetes Mother    • Psychiatric Illness Sister         depression   • Cancer Father         prostate;skin     Family Status   Relation Name Status   • Mo   at age 91   • Sis     • Fa   at age 86         ROS  The pertinent  ROS findings can be seen in the HPI above.     All other systems reviewed and are negative     Objective:     /78 (BP Location: Left arm, Patient  "Position: Sitting, BP Cuff Size: Adult)   Pulse 74   Temp 37.2 °C (98.9 °F)   Ht 1.702 m (5' 7\")   Wt 92.4 kg (203 lb 12.8 oz)   SpO2 98%  Body mass index is 31.92 kg/m².      Physical Exam:    Constitutional: Alert, no distress.  Skin: no rashes  Eye: Equal, round and reactive, conjunctiva clear, lids normal.  ENMT: Lips without lesions, good dentition, oropharynx clear.  Neck: Trachea midline, no masses, no thyromegaly. No cervical or supraclavicular lymphadenopathy.  Respiratory: Poor air entry bilaterally in all lung fields, lungs clear to auscultation, no wheezes, no ronchi.  Cardiovascular: Normal S1, S2, no murmur, no edema  Abdomen: Soft, non-tender, no masses, no hepatosplenomegaly.  Psych: Alert and oriented x3, normal affect and mood.          Assessment and Plan:   The following treatment plan was discussed      1. Smoking history    I applauded her decision to stop smoking    Patient was counseled on smoking cessation, we discussed the benefits of quitting including decrease risk of MI by 50% after one year of cessation, decreased risk of lung cancer after 12 years, one cigarette is enough to paralyze cilia for 24 hours leading to increase risk of pulmonary infection, etc.... .Also encouraged to set a stop date.      - varenicline (CHANTIX STARTING MONTH JENNY) 0.5 MG X 11 & 1 MG X 42 tablet; Days 1 to 3: 0.5 mg once daily Days 4 to 7: 0.5 mg twice daily Maintenance (= Day 8): 1 mg twice daily for 11 weeks;  Dispense: 56 Tab; Refill: 3    2. Mixed simple and mucopurulent chronic bronchitis (HCC)    She understands that only smoking cessation and oxygen therapy can delay the progression of COPD    - albuterol (PROVENTIL) 2.5mg/0.5ml Nebu Soln; 0.5 mL by Nebulization route every four hours as needed for Shortness of Breath.  Dispense: 2 Bottle; Refill: 3  - albuterol (PROAIR HFA) 108 (90 Base) MCG/ACT Aero Soln inhalation aerosol; Inhale 2 Puffs by mouth every 6 hours as needed for Shortness of Breath. "  Dispense: 8.5 g; Refill: 3    3. Candidal intertrigo  Patient has been stable with current management  We will make no changes for now          Instructed to Follow up in clinic or ER for worsening symptoms, difficulty breathing, lack of expected recovery, or should new symptoms or problems arise.    Followup: Return in about 2 months (around 9/30/2019) for Reevaluation.       Once again this medical record contains text that has been entered with the assistance of computer voice recognition and dictation software.  Therefore, it may contain unintended errors in text, spelling, punctuation, or grammar

## 2019-08-02 NOTE — TELEPHONE ENCOUNTER
Was the patient seen in the last year in this department? Yes     Does patient have an active prescription for medications requested? No     Received Request Via: Pharmacy   24-Aug-1971

## 2019-08-05 ENCOUNTER — TELEPHONE (OUTPATIENT)
Dept: MEDICAL GROUP | Age: 64
End: 2019-08-05

## 2019-08-05 NOTE — TELEPHONE ENCOUNTER
"· Overnight Pulse Oximetry paperwork received from [facility] requiring provider signature.     · All appropriate fields completed by Medical Assistant: N/A CMA printed and distributed to MA    · Paperwork placed in \"MA to Provider\" folder/basket. Awaiting provider completion/signature.    "

## 2019-09-25 ENCOUNTER — OFFICE VISIT (OUTPATIENT)
Dept: MEDICAL GROUP | Age: 64
End: 2019-09-25
Payer: MEDICARE

## 2019-09-25 VITALS
HEART RATE: 57 BPM | DIASTOLIC BLOOD PRESSURE: 68 MMHG | SYSTOLIC BLOOD PRESSURE: 114 MMHG | HEIGHT: 67 IN | WEIGHT: 204 LBS | BODY MASS INDEX: 32.02 KG/M2 | TEMPERATURE: 97.9 F | OXYGEN SATURATION: 92 %

## 2019-09-25 DIAGNOSIS — E03.4 HYPOTHYROIDISM DUE TO ACQUIRED ATROPHY OF THYROID: ICD-10-CM

## 2019-09-25 DIAGNOSIS — Z11.59 NEED FOR HEPATITIS C SCREENING TEST: ICD-10-CM

## 2019-09-25 DIAGNOSIS — E66.9 OBESITY (BMI 30-39.9): ICD-10-CM

## 2019-09-25 DIAGNOSIS — Z23 NEED FOR VACCINATION: Primary | ICD-10-CM

## 2019-09-25 DIAGNOSIS — J42 CHRONIC BRONCHITIS, UNSPECIFIED CHRONIC BRONCHITIS TYPE (HCC): ICD-10-CM

## 2019-09-25 PROCEDURE — G0009 ADMIN PNEUMOCOCCAL VACCINE: HCPCS | Performed by: FAMILY MEDICINE

## 2019-09-25 PROCEDURE — 90662 IIV NO PRSV INCREASED AG IM: CPT | Performed by: FAMILY MEDICINE

## 2019-09-25 PROCEDURE — 99214 OFFICE O/P EST MOD 30 MIN: CPT | Mod: 25 | Performed by: FAMILY MEDICINE

## 2019-09-25 PROCEDURE — 90732 PPSV23 VACC 2 YRS+ SUBQ/IM: CPT | Performed by: FAMILY MEDICINE

## 2019-09-25 PROCEDURE — G0008 ADMIN INFLUENZA VIRUS VAC: HCPCS | Performed by: FAMILY MEDICINE

## 2019-09-25 NOTE — PROGRESS NOTES
This medical record contains text that has been entered with the assistance of computer voice recognition and dictation software.  Therefore, it may contain unintended errors in text, spelling, punctuation, or grammar    Chief Complaint   Patient presents with   • Follow-Up     pt quit smoking    • Flu Vaccine     wants flu and pnemonia shot          Alex Shepard is a 64 y.o. female here evaluation and management of: routine follow up       HPI:       1. Hypothyroidism due to acquired atrophy of thyroid  Taking synthroid 75mcg QD without side effects. The patient denies any new fatigue, cold/heat intolerance, weight gain/weight loss, diarrhea/constipation, dry skin, myalgia, depressed mood, palpitations, tremmor, hair loss, and no goiter.    2. Chronic bronchitis, unspecified chronic bronchitis type (HCC)  Patient quit smoking 2 months ago with the help of chantix. She denies any difficulty breathing, cough, shortness of breath, chest pains, fever, chills, abdominal pain, no hemoptysis, no unintentional weight loss, no generalized malaise. She has not needed to use her Symbicort or rescue inhaler. She does suffer from frequent bronchitis and is given antibiotics when neededs    3. Need for hepatitis C screening test  The patient was born between 1947 and 1967  So is due for hepatitis C screening    4. Need for vaccination  Patient is due for flu and pneumonia vaccinations. These will be given today.      5. Obesity (BMI 30-39.9)  The patient states she plans on becoming more active now that he quit smoking.  Overall she feels much better about her decision.    Current medicines (including changes today)  Current Outpatient Medications   Medication Sig Dispense Refill   • Zoster Vac Recomb Adjuvanted (SHINGRIX) 50 MCG/0.5ML Recon Susp 0.5 mL by Intramuscular route Once for 1 dose. 0.5 mL 0   • albuterol (PROVENTIL) 2.5mg/0.5ml Nebu Soln 0.5 mL by Nebulization route every four hours as needed for Shortness of  Breath. 2 Bottle 3   • albuterol (PROAIR HFA) 108 (90 Base) MCG/ACT Aero Soln inhalation aerosol Inhale 2 Puffs by mouth every 6 hours as needed for Shortness of Breath. 8.5 g 3   • BACLOFEN PO Take  by mouth.     • levothyroxine (SYNTHROID) 75 MCG Tab TAKE ONE TABLET BY MOUTH IN THE MORNING ON  AN  EMPTY  STOMACH 90 Tab 0   • nystatin (MYCOSTATIN) powder Apply 1 g to affected area(s) 4 times a day. 15 g 3   • DILTIAZem CD (CARDIZEM CD) 180 MG CAPSULE SR 24 HR Take 180 mg by mouth every day.     • calcium-vitamin D (OSCAL 500 +D) 500-200 MG-UNIT Tab Take 1 Tab by mouth 2 times a day, with meals. 360 Tab 0   • triamcinolone acetonide (KENALOG) 0.1 % Ointment Aaa bid x 14 days then stop 1 Tube 1   • ibuprofen (MOTRIN) 600 MG Tab Take 600 mg by mouth every 6 hours as needed.     • tramadol (ULTRAM) 50 MG Tab Take  mg by mouth every four hours as needed.     • duloxetine (CYMBALTA) 30 MG Cap DR Particles 30 mg every day. calms her down with pain     • aripiprazole (ABILIFY) 10 MG Tab Take 10 mg by mouth every day.     • varenicline (CHANTIX STARTING MONTH PAK) 0.5 MG X 11 & 1 MG X 42 tablet Days 1 to 3: 0.5 mg once daily Days 4 to 7: 0.5 mg twice daily Maintenance (= Day 8): 1 mg twice daily for 11 weeks; (Patient not taking: Reported on 9/25/2019) 56 Tab 3   • predniSONE (DELTASONE) 20 MG Tab 1 tab TID x 2 days, then 1 tab BID x 5 days, then 1 tab QD x 5 (Patient not taking: Reported on 9/25/2019) 21 Tab 0   • budesonide-formoterol (SYMBICORT) 160-4.5 MCG/ACT Aerosol Inhale 2 Puffs by mouth. Rinse mouth after each use.       No current facility-administered medications for this visit.      She  has a past medical history of Anxiety, ASTHMA, Back pain, Bipolar affect, depressed ([facility]), Bronchitis, Cholelithiasis, Chronic airway obstruction, not elsewhere classified, Cigarette smoker one half pack a day or less, Depression, H/O cervical spine surgery (2002 / 2005), Hyponatremia, Pneumonia, Psychiatric disorder  "(), Psychosis (HCC), PTSD (post-traumatic stress disorder), Pulmonary nodule, S/P appendectomy (), and S/P repair of patent ductus arteriosus ().  She  has a past surgical history that includes other cardiac surgery; other (, ); other; appendectomy; and cristofer by laparoscopy (Bilateral, 2016).  Social History     Tobacco Use   • Smoking status: Former Smoker     Packs/day: 0.50     Years: 15.00     Pack years: 7.50     Types: Cigarettes     Start date: 1999   • Smokeless tobacco: Never Used   • Tobacco comment: quit 2 months ago    Substance Use Topics   • Alcohol use: No     Alcohol/week: 0.0 oz     Comment: occas glass of wine, Agrees to stop use   • Drug use: No     Social History     Social History Narrative    , 2 children.      Family History   Problem Relation Age of Onset   • Psychiatric Illness Mother         depression   • Other Mother         TIA   • Diabetes Mother    • Psychiatric Illness Sister         depression   • Cancer Father         prostate;skin     Family Status   Relation Name Status   • Mo   at age 91   • Sis     • Fa   at age 86         ROS    Please see hpi     All other systems reviewed and are negative     Objective:     /68 (BP Location: Left arm, Patient Position: Sitting, BP Cuff Size: Adult)   Pulse (!) 57   Temp 36.6 °C (97.9 °F) (Temporal)   Ht 1.702 m (5' 7\")   Wt 92.5 kg (204 lb)   SpO2 92%  Body mass index is 31.95 kg/m².  Physical Exam:    Constitutional: Alert, no distress.  Skin: Warm, dry, good turgor, no rashes in visible areas.  Eye: Equal, round and reactive, conjunctiva clear, lids normal.  ENMT: Lips without lesions, good dentition, oropharynx clear.  Neck: Trachea midline, no masses, no thyromegaly. No cervical or supraclavicular lymphadenopathy.  Respiratory: Unlabored respiratory effort, lungs clear to auscultation, no wheezes, no ronchi.  Cardiovascular: Normal S1, S2, no murmur, no edema.  Psych: " Alert and oriented x3, normal affect and mood.      Assessment and Plan:   The following treatment plan was discussed      1. Hypothyroidism due to acquired atrophy of thyroid    Stable and continue current medication. Recheck lab prior to next visit.      2. Chronic bronchitis, unspecified chronic bronchitis type (HCC)    Encouraged patient to continue her plan not to smoke. Informed her of the benefits of quitting smoking including decrease risk of MI by 50% after one year of cessation, decreased risk of lung cancer after 12 years, one cigarette is enough to paralyze cilia for 24 hours leading to increase risk of pulmonary infection, etc    3. Need for hepatitis C screening test     The USPSTF recommends offering 1-time screening for HCV infection to adults born between 1945 and 1965 (baby boomers).    - Hep C Virus Antibody; Future      4. Need for vaccination    Vaccines were administered today without adverse event. She will follow up with pharmacy for shingrix vaccination.    - Influenza Vaccine, High Dose (65+ Only)  - Pneumovax Vaccine (PPSV23)  - Zoster Vac Recomb Adjuvanted (SHINGRIX) 50 MCG/0.5ML Recon Susp; 0.5 mL by Intramuscular route Once for 1 dose.  Dispense: 0.5 mL; Refill: 0      5. Obesity (BMI 30-39.9)  Patient was given appropriate lifestyle modification instructions.  She understands the importance of weight loss through diet and exercise.    HEALTH MAINTENANCE: Up to date    Instructed to Follow up in clinic or ER for worsening symptoms, difficulty breathing, lack of expected recovery, or should new symptoms or problems arise.    Followup: Return in about 6 months (around 3/25/2020) for Reevaluation.      Once again this medical record contains text that has been entered with the assistance of computer voice recognition, dictation software, and medical scribes.  Therefore, it may contain unintended errors in text, spelling, punctuation, or grammar.    Mason GUSTAFSON (Scribe), am  scribing for, and in the presence of, Remigio Raza M.D.    Electronically signed by: Mason Marinelli (Scribe), 9/25/2019     I, Remigio Raza M.D. personally performed the services described in this documentation, as scribed by Mason Marinelli in my presence, and it is both accurate and complete.

## 2019-10-14 DIAGNOSIS — E03.4 HYPOTHYROIDISM DUE TO ACQUIRED ATROPHY OF THYROID: ICD-10-CM

## 2019-10-16 RX ORDER — LEVOTHYROXINE SODIUM 0.07 MG/1
TABLET ORAL
Qty: 90 TAB | Refills: 2 | Status: SHIPPED | OUTPATIENT
Start: 2019-10-16 | End: 2022-03-03

## 2019-10-16 NOTE — TELEPHONE ENCOUNTER
Was the patient seen in the last year in this department? Yes 9-    Does patient have an active prescription for medications requested? No     Received Request Via: Pharmacy

## 2019-12-18 DIAGNOSIS — B37.2 CANDIDIASIS, INTERTRIGO: ICD-10-CM

## 2019-12-19 RX ORDER — NYSTATIN 100000 [USP'U]/G
1 POWDER TOPICAL 4 TIMES DAILY
Qty: 15 G | Refills: 0 | Status: SHIPPED | OUTPATIENT
Start: 2019-12-19 | End: 2020-02-13

## 2020-02-13 DIAGNOSIS — B37.2 CANDIDIASIS, INTERTRIGO: ICD-10-CM

## 2020-02-14 RX ORDER — NYSTATIN 100000 [USP'U]/G
POWDER TOPICAL
Qty: 15 G | Refills: 0 | Status: SHIPPED | OUTPATIENT
Start: 2020-02-14 | End: 2022-03-03

## 2020-03-25 ENCOUNTER — OFFICE VISIT (OUTPATIENT)
Dept: MEDICAL GROUP | Age: 65
End: 2020-03-25
Payer: MEDICARE

## 2020-03-25 VITALS
DIASTOLIC BLOOD PRESSURE: 84 MMHG | HEART RATE: 77 BPM | BODY MASS INDEX: 33.43 KG/M2 | SYSTOLIC BLOOD PRESSURE: 124 MMHG | WEIGHT: 213 LBS | HEIGHT: 67 IN | TEMPERATURE: 97.8 F | OXYGEN SATURATION: 97 %

## 2020-03-25 DIAGNOSIS — E03.4 HYPOTHYROIDISM DUE TO ACQUIRED ATROPHY OF THYROID: ICD-10-CM

## 2020-03-25 DIAGNOSIS — Z00.00 ANNUAL PHYSICAL EXAM: ICD-10-CM

## 2020-03-25 DIAGNOSIS — B37.2 CANDIDIASIS, INTERTRIGO: ICD-10-CM

## 2020-03-25 DIAGNOSIS — F31.9 BIPOLAR 1 DISORDER (HCC): ICD-10-CM

## 2020-03-25 DIAGNOSIS — B37.2 CANDIDAL INTERTRIGO: ICD-10-CM

## 2020-03-25 PROCEDURE — 99214 OFFICE O/P EST MOD 30 MIN: CPT | Performed by: FAMILY MEDICINE

## 2020-03-25 RX ORDER — NYSTATIN 100000 [USP'U]/G
1 POWDER TOPICAL 4 TIMES DAILY
Qty: 15 G | Refills: 3 | Status: SHIPPED | OUTPATIENT
Start: 2020-03-25 | End: 2020-12-01

## 2020-03-25 RX ORDER — ARIPIPRAZOLE 10 MG/1
10 TABLET ORAL DAILY
Qty: 90 TAB | Refills: 1 | Status: SHIPPED | OUTPATIENT
Start: 2020-03-25 | End: 2020-12-11

## 2020-03-25 RX ORDER — DULOXETIN HYDROCHLORIDE 30 MG/1
30 CAPSULE, DELAYED RELEASE ORAL DAILY
Qty: 90 CAP | Refills: 2 | Status: SHIPPED | OUTPATIENT
Start: 2020-03-25 | End: 2020-12-30

## 2020-03-25 ASSESSMENT — FIBROSIS 4 INDEX: FIB4 SCORE: 1.38

## 2020-03-25 NOTE — PROGRESS NOTES
This medical record contains text that has been entered with the assistance of computer voice recognition and dictation software.  Therefore, it may contain unintended errors in text, spelling, punctuation, or grammar    Chief Complaint   Patient presents with   • Follow-Up     6 month    • Manic Behavior     ambilafy and cymbalta refill        Alex Shepard is a 64 y.o. female here evaluation and management of: 6 month follow up and medication refill.        HPI:   64 year old with history of manic behavior who presents with chief complaint of medication refill, 6 month follow up.       1. Bipolar 1 disorder (HCC)    Chronic problem, new problem to me.   Patient has a chronic history of manic behavior, currently taking Abilify 10 mg QD, Cymbalta 30 mg QD. She denies any adverse side effects. Patient was previously working with [facility], who was prescribing medications. She wondering if she can start seeing PCP for her medication refill. Patient denies any recent manic episodes.  States she has been stable on this medication for over 20 years has not had any manic episodes.      2. Hypothyroidism due to acquired atrophy of thyroid    Chronic problem. Currently taking Levothyroxine 75 mcg QD. Denies any adverse effects.   The patient denies any new fatigue, cold/heat intolerance, weight gain/weight loss, diarrhea/constipation, dry skin, myalgia, depressed mood, palpitations, tremmor, hair loss, and no goiter.      3. Candidal intertrigo  Patient states the nystatin powder works very well she would like to continue using it.        Current medicines (including changes today)  Current Outpatient Medications   Medication Sig Dispense Refill   • ARIPiprazole (ABILIFY) 10 MG Tab Take 1 Tab by mouth every day. 90 Tab 1   • nystatin (MYCOSTATIN) powder Apply 1 g to affected area(s) 4 times a day. 15 g 3   • DULoxetine (CYMBALTA) 30 MG Cap DR Particles Take 1 Cap by mouth every day. calms her down with pain 90 Cap 2   •  NYSTOP powder APPLY 1 GRAM TO THE AFFECTED AREA(S) FOUR TIMES DAILY 15 g 0   • albuterol (PROVENTIL) 2.5mg/0.5ml Nebu Soln 0.5 mL by Nebulization route every four hours as needed for Shortness of Breath. 2 Bottle 3   • albuterol (PROAIR HFA) 108 (90 Base) MCG/ACT Aero Soln inhalation aerosol Inhale 2 Puffs by mouth every 6 hours as needed for Shortness of Breath. 8.5 g 3   • BACLOFEN PO Take  by mouth.     • levothyroxine (SYNTHROID) 75 MCG Tab TAKE ONE TABLET BY MOUTH IN THE MORNING ON  AN  EMPTY  STOMACH 90 Tab 0   • DILTIAZem CD (CARDIZEM CD) 180 MG CAPSULE SR 24 HR Take 180 mg by mouth every day.     • calcium-vitamin D (OSCAL 500 +D) 500-200 MG-UNIT Tab Take 1 Tab by mouth 2 times a day, with meals. 360 Tab 0   • triamcinolone acetonide (KENALOG) 0.1 % Ointment Aaa bid x 14 days then stop 1 Tube 1   • ibuprofen (MOTRIN) 600 MG Tab Take 600 mg by mouth every 6 hours as needed.     • tramadol (ULTRAM) 50 MG Tab Take  mg by mouth every four hours as needed.     • levothyroxine (SYNTHROID) 75 MCG Tab TAKE 1 TABLET BY MOUTH IN THE MORNING ON AN EMPTY STOMACH (Patient not taking: Reported on 3/25/2020) 90 Tab 2   • varenicline (CHANTIX STARTING MONTH JENNY) 0.5 MG X 11 & 1 MG X 42 tablet Days 1 to 3: 0.5 mg once daily Days 4 to 7: 0.5 mg twice daily Maintenance (= Day 8): 1 mg twice daily for 11 weeks; (Patient not taking: Reported on 9/25/2019) 56 Tab 3   • predniSONE (DELTASONE) 20 MG Tab 1 tab TID x 2 days, then 1 tab BID x 5 days, then 1 tab QD x 5 (Patient not taking: Reported on 9/25/2019) 21 Tab 0   • budesonide-formoterol (SYMBICORT) 160-4.5 MCG/ACT Aerosol Inhale 2 Puffs by mouth. Rinse mouth after each use.       No current facility-administered medications for this visit.      She  has a past medical history of Anxiety, ASTHMA, Back pain, Bipolar affect, depressed (HCC), Bronchitis, Cholelithiasis, Chronic airway obstruction, not elsewhere classified, Cigarette smoker one half pack a day or less,  "Depression, H/O cervical spine surgery ( / ), Hyponatremia, Pneumonia, Psychiatric disorder (), Psychosis (), PTSD (post-traumatic stress disorder), Pulmonary nodule, S/P appendectomy (), and S/P repair of patent ductus arteriosus ().  She  has a past surgical history that includes other cardiac surgery; other (, ); other; appendectomy; and cristofer by laparoscopy (Bilateral, 2016).  Social History     Tobacco Use   • Smoking status: Former Smoker     Packs/day: 0.50     Years: 15.00     Pack years: 7.50     Types: Cigarettes     Start date: 1999   • Smokeless tobacco: Never Used   • Tobacco comment: quit 2 months ago    Substance Use Topics   • Alcohol use: No     Alcohol/week: 0.0 oz     Comment: occas glass of wine, Agrees to stop use   • Drug use: No     Social History     Social History Narrative    , 2 children.      Family History   Problem Relation Age of Onset   • Psychiatric Illness Mother         depression   • Other Mother         TIA   • Diabetes Mother    • Psychiatric Illness Sister         depression   • Cancer Father         prostate;skin     Family Status   Relation Name Status   • Mo   at age 91   • Sis     • Fa   at age 86         ROS    Please see hpi     All other systems reviewed and are negative     Objective:     /84 (BP Location: Left arm, Patient Position: Sitting, BP Cuff Size: Adult)   Pulse 77   Temp 36.6 °C (97.8 °F) (Temporal)   Ht 1.702 m (5' 7\")   Wt 96.6 kg (213 lb)   SpO2 97%  Body mass index is 33.36 kg/m².  Physical Exam:    Constitutional: Alert, no distress.  Eye: Equal, round and reactive, conjunctiva clear, lids normal.  ENMT: Lips without lesions, good dentition, oropharynx clear.  Neck: Trachea midline, no masses, no thyromegaly. No cervical or supraclavicular lymphadenopathy.  Respiratory: Unlabored respiratory effort, lungs clear to auscultation, no wheezes, no ronchi.  Cardiovascular: Normal " S1, S2, no murmur, no edema.  Abdomen: Soft, non-tender, no masses, no hepatosplenomegaly.  Psych: Alert and oriented x3, normal affect and mood.      Assessment and Plan:   The following treatment plan was discussed      1. Bipolar 1 disorder (HCC)    - Chronic problem, new to me. Previously seeing [facility]. Refilled Cymbalta 30 mg QD, Abilify 10 mg QD.       2. Hypothyroidism due to acquired atrophy of thyroid    - Chronic problem, stable. Continue Levothyroxine 75 mcg QD.     3. Candidal intertrigo      This patients is at risk for recurrence should use a drying agent indefinitely.  Use drying agents including antifungal powders (eg. Nystatin, miconazole, undecylenic acid, and tolnaftat)    Intermittent (eg, twice-weekly) use of topical antifungal is sometimes used in an attempt to decrease the likelihood of recurrent candidal infection.   Improve obesity, incontinence and DM if present      - nystatin (MYCOSTATIN) powder; Apply 1 g to affected area(s) 4 times a day.  Dispense: 15 g; Refill: 3    5. Annual physical exam   annual labs ordered    - Comp Metabolic Panel; Future  - CBC WITHOUT DIFFERENTIAL; Future  - Lipid Profile; Future  - TSH+FREE T4  - T3 FREE; Future  - VITAMIN D,25 HYDROXY; Future       HEALTH MAINTENANCE:    Instructed to Follow up in clinic or ER for worsening symptoms, difficulty breathing, lack of expected recovery, or should new symptoms or problems arise.    Followup: Return in about 3 months (around 6/25/2020).      Once again this medical record contains text that has been entered with the assistance of computer voice recognition, dictation software, and medical scribes.  Therefore, it may contain unintended errors in text, spelling, punctuation, or grammar.    Sidney GUSTAFSON (Scribe), am scribing for, and in the presence of, Remigio Raza M.D.    Electronically signed by: Sidney Siu (Declan), 3/25/2020     Remigio GUSTAFSON M.D. personally performed the services described in this  documentation, as scribed by Sidney Siu in my presence, and it is both accurate and complete.

## 2020-05-01 ENCOUNTER — TELEPHONE (OUTPATIENT)
Dept: MEDICAL GROUP | Age: 65
End: 2020-05-01

## 2020-05-01 NOTE — TELEPHONE ENCOUNTER
Received request via: Patient    Was the patient seen in the last year in this department? Yes    Does the patient have an active prescription (recently filled or refills available) for medication(s) requested? No     Pt would like to know if you can refill fluticasone (FLONASE) 50 MCG/ACT nasal spray. Unable to pend the order. Please advise

## 2020-05-04 RX ORDER — FLUTICASONE PROPIONATE 50 MCG
1 SPRAY, SUSPENSION (ML) NASAL DAILY
Qty: 16 G | Refills: 4 | Status: SHIPPED | OUTPATIENT
Start: 2020-05-04 | End: 2021-10-01 | Stop reason: SDUPTHER

## 2020-05-28 ENCOUNTER — PATIENT OUTREACH (OUTPATIENT)
Dept: SCHEDULING | Facility: IMAGING CENTER | Age: 65
End: 2020-05-28

## 2020-05-28 NOTE — PROGRESS NOTES
Outcome:Unable to leave Message- voicemail box is not set up     Please transfer to Patient Outreach Team at [phone] when patient returns call.      Attempt # 1

## 2020-06-18 NOTE — PROGRESS NOTES
Outcome: Unable to leave message. VM is not set up.    Please transfer to Patient Outreach Team at [phone] when patient returns call.    Attempt # 2 AA

## 2020-06-30 NOTE — PROGRESS NOTES
Outcome: Unable to leave voicemail.    Please transfer to Patient Outreach Team at [phone] when patient returns call.  Attempt # 3 AA

## 2020-07-02 ENCOUNTER — PATIENT MESSAGE (OUTPATIENT)
Dept: MEDICAL GROUP | Age: 65
End: 2020-07-02

## 2020-07-02 DIAGNOSIS — E03.4 HYPOTHYROIDISM DUE TO ACQUIRED ATROPHY OF THYROID: ICD-10-CM

## 2020-07-02 RX ORDER — LEVOTHYROXINE SODIUM 0.07 MG/1
TABLET ORAL
Qty: 90 TAB | Refills: 0 | Status: SHIPPED | OUTPATIENT
Start: 2020-07-02 | End: 2020-09-29

## 2020-08-27 ENCOUNTER — HOSPITAL ENCOUNTER (OUTPATIENT)
Dept: LAB | Facility: MEDICAL CENTER | Age: 65
End: 2020-08-27
Attending: FAMILY MEDICINE
Payer: MEDICARE

## 2020-08-27 DIAGNOSIS — Z00.00 ANNUAL PHYSICAL EXAM: ICD-10-CM

## 2020-08-27 DIAGNOSIS — Z11.59 NEED FOR HEPATITIS C SCREENING TEST: ICD-10-CM

## 2020-09-24 ENCOUNTER — OFFICE VISIT (OUTPATIENT)
Dept: MEDICAL GROUP | Age: 65
End: 2020-09-24
Payer: MEDICARE

## 2020-09-24 VITALS
OXYGEN SATURATION: 94 % | SYSTOLIC BLOOD PRESSURE: 108 MMHG | WEIGHT: 219.58 LBS | HEART RATE: 59 BPM | BODY MASS INDEX: 34.46 KG/M2 | TEMPERATURE: 98 F | HEIGHT: 67 IN | DIASTOLIC BLOOD PRESSURE: 62 MMHG

## 2020-09-24 DIAGNOSIS — E66.9 OBESITY (BMI 30-39.9): ICD-10-CM

## 2020-09-24 DIAGNOSIS — Z23 NEED FOR VACCINATION: ICD-10-CM

## 2020-09-24 DIAGNOSIS — E78.00 PURE HYPERCHOLESTEROLEMIA: ICD-10-CM

## 2020-09-24 DIAGNOSIS — Z11.59 NEED FOR HEPATITIS C SCREENING TEST: ICD-10-CM

## 2020-09-24 DIAGNOSIS — E03.4 HYPOTHYROIDISM DUE TO ACQUIRED ATROPHY OF THYROID: ICD-10-CM

## 2020-09-24 PROCEDURE — 99214 OFFICE O/P EST MOD 30 MIN: CPT | Performed by: FAMILY MEDICINE

## 2020-09-24 RX ORDER — MELOXICAM 7.5 MG/1
7.5 TABLET ORAL 2 TIMES DAILY PRN
COMMUNITY
Start: 2020-09-17 | End: 2022-03-03

## 2020-09-24 RX ORDER — PHENTERMINE HYDROCHLORIDE 15 MG/1
15 CAPSULE ORAL EVERY MORNING
Qty: 30 CAP | Refills: 0 | Status: SHIPPED | OUTPATIENT
Start: 2020-09-24 | End: 2020-10-24

## 2020-09-24 SDOH — HEALTH STABILITY: MENTAL HEALTH: HOW OFTEN DO YOU HAVE A DRINK CONTAINING ALCOHOL?: MONTHLY OR LESS

## 2020-09-24 ASSESSMENT — FIBROSIS 4 INDEX: FIB4 SCORE: 1.4

## 2020-09-24 NOTE — PROGRESS NOTES
This medical record contains text that has been entered with the assistance of computer voice recognition and dictation software.  Therefore, it may contain unintended errors in text, spelling, punctuation, or grammar      Chief Complaint   Patient presents with   • Follow-Up     6 mo   • Weight Gain         Alex Shepard is a 65 y.o. female here evaluation and management of: 6-month follow-up      HPI:     1. Pure hypercholesterolemia  Patient has been trying to use lifestyle modification to address this however she has been unsuccessful.  She has been gaining weight she has a history of bursitis in her hip and that is preventing her from walking.  She is very much interested in an appetite suppressant.    2. Hypothyroidism due to acquired atrophy of thyroid  Levothyroxine 75 mcg p.o. daily    The patient continues to complain of fatigue, weight gain and lack of exercise.  She denies any palpitations no perspiration no diarrhea    3. Obesity (BMI 30-39.9)  Patient states that she is tried several times to be on a diet but she cannot control her appetite.  She wants to have an appetite suppressant.    4. Need for hepatitis C screening test  The patient was born between 1947 and 1967  So is due for hepatitis C screening      5. Need for vaccination  Due for shingrix and flu    Current medicines (including changes today)  Current Outpatient Medications   Medication Sig Dispense Refill   • phentermine 15 MG capsule Take 1 Cap by mouth every morning for 30 days. 30 Cap 0   • levothyroxine (SYNTHROID) 75 MCG Tab TAKE ONE TABLET BY MOUTH IN THE MORNING ON  AN  EMPTY  STOMACH 90 Tab 0   • fluticasone (FLONASE) 50 MCG/ACT nasal spray Spray 1 Spray in nose every day. 16 g 4   • ARIPiprazole (ABILIFY) 10 MG Tab Take 1 Tab by mouth every day. 90 Tab 1   • nystatin (MYCOSTATIN) powder Apply 1 g to affected area(s) 4 times a day. 15 g 3   • DULoxetine (CYMBALTA) 30 MG Cap DR Particles Take 1 Cap by mouth every day. calms her  down with pain 90 Cap 2   • NYSTOP powder APPLY 1 GRAM TO THE AFFECTED AREA(S) FOUR TIMES DAILY 15 g 0   • albuterol (PROVENTIL) 2.5mg/0.5ml Nebu Soln 0.5 mL by Nebulization route every four hours as needed for Shortness of Breath. 2 Bottle 3   • albuterol (PROAIR HFA) 108 (90 Base) MCG/ACT Aero Soln inhalation aerosol Inhale 2 Puffs by mouth every 6 hours as needed for Shortness of Breath. 8.5 g 3   • BACLOFEN PO Take  by mouth.     • DILTIAZem CD (CARDIZEM CD) 180 MG CAPSULE SR 24 HR Take 180 mg by mouth every day.     • calcium-vitamin D (OSCAL 500 +D) 500-200 MG-UNIT Tab Take 1 Tab by mouth 2 times a day, with meals. 360 Tab 0   • triamcinolone acetonide (KENALOG) 0.1 % Ointment Aaa bid x 14 days then stop 1 Tube 1   • budesonide-formoterol (SYMBICORT) 160-4.5 MCG/ACT Aerosol Inhale 2 Puffs by mouth. Rinse mouth after each use.     • tramadol (ULTRAM) 50 MG Tab Take  mg by mouth every four hours as needed.     • meloxicam (MOBIC) 7.5 MG Tab Take 7.5 mg by mouth 2 times a day as needed. PAIN     • levothyroxine (SYNTHROID) 75 MCG Tab TAKE 1 TABLET BY MOUTH IN THE MORNING ON AN EMPTY STOMACH (Patient not taking: Reported on 3/25/2020) 90 Tab 2   • varenicline (CHANTIX STARTING MONTH PAK) 0.5 MG X 11 & 1 MG X 42 tablet Days 1 to 3: 0.5 mg once daily Days 4 to 7: 0.5 mg twice daily Maintenance (= Day 8): 1 mg twice daily for 11 weeks; (Patient not taking: Reported on 9/25/2019) 56 Tab 3   • predniSONE (DELTASONE) 20 MG Tab 1 tab TID x 2 days, then 1 tab BID x 5 days, then 1 tab QD x 5 (Patient not taking: Reported on 9/25/2019) 21 Tab 0   • ibuprofen (MOTRIN) 600 MG Tab Take 600 mg by mouth every 6 hours as needed.       No current facility-administered medications for this visit.      She  has a past medical history of Anxiety, ASTHMA, Back pain, Bipolar affect, depressed (HCC), Bronchitis, Cholelithiasis, Chronic airway obstruction, not elsewhere classified, Cigarette smoker one half pack a day or less,  "Depression, H/O cervical spine surgery ( / ), Hyponatremia, Pneumonia, Psychiatric disorder (), Psychosis (), PTSD (post-traumatic stress disorder), Pulmonary nodule, S/P appendectomy (), and S/P repair of patent ductus arteriosus ().  She  has a past surgical history that includes other cardiac surgery; other (, ); other; appendectomy; and cristofer by laparoscopy (Bilateral, 2016).  Social History     Tobacco Use   • Smoking status: Former Smoker     Packs/day: 0.50     Years: 15.00     Pack years: 7.50     Types: Cigarettes     Start date: 1999   • Smokeless tobacco: Never Used   • Tobacco comment: quit 2 months ago    Substance Use Topics   • Alcohol use: Yes     Alcohol/week: 0.0 oz     Frequency: Monthly or less     Comment: occas glass of wine, Agrees to stop use   • Drug use: No     Social History     Social History Narrative    , 2 children.      Family History   Problem Relation Age of Onset   • Psychiatric Illness Mother         depression   • Other Mother         TIA   • Diabetes Mother    • Psychiatric Illness Sister         depression   • Cancer Father         prostate;skin     Family Status   Relation Name Status   • Mo   at age 91   • Sis     • Fa   at age 86         ROS    The pertinent  ROS findings can be seen in the HPI above.     All other systems reviewed and are negative     Objective:     /62 (BP Location: Left arm, Patient Position: Sitting, BP Cuff Size: Adult)   Pulse (!) 59   Temp 36.7 °C (98 °F) (Temporal)   Ht 1.702 m (5' 7\")   Wt 99.6 kg (219 lb 9.3 oz)   SpO2 94%  Body mass index is 34.39 kg/m².      Physical Exam:    Constitutional: Alert, no distress.  Skin: No rashes  Eye: Equal, round and reactive, conjunctiva clear, lids normal.  ENMT: Lips without lesions, good dentition, oropharynx clear.  Neck: Trachea midline, no masses, no thyromegaly. No cervical or supraclavicular lymphadenopathy.  Respiratory: " Unlabored respiratory effort, lungs clear to auscultation, no wheezes, no ronchi.  Cardiovascular: Normal S1, S2, no murmur, no edema  Abdomen: Soft, non-tender, no masses, no hepatosplenomegaly.        Assessment and Plan:   The following treatment plan was discussed      1. Pure hypercholesterolemia    We will obtain new labs to update clinical profile.  Then we will adjust therapy as needed.    - Comp Metabolic Panel; Future  - CBC WITHOUT DIFFERENTIAL; Future  - Lipid Profile; Future    2. Hypothyroidism due to acquired atrophy of thyroid  We will obtain new labs to update clinical profile.  Then we will adjust therapy as needed.    - TSH+FREE T4  - T3 FREE; Future    3. Obesity (BMI 30-39.9)    We discussed the risk and benefit in detail with the patient.  She is very adamant on starting an appetite suppressant.  She has no history of cardiac valve pathology or pulmonary hypertension however she does have a history of COPD.  This is not a documented contraindication.    - phentermine 15 MG capsule; Take 1 Cap by mouth every morning for 30 days.  Dispense: 30 Cap; Refill: 0    4. Need for hepatitis C screening test  The patient was born between  and   So is due for hepatitis C screening       The [employer]PSTF recommends offering 1-time screening for HCV infection to adults born between  and  (baby boomers).  - HCV Scrn ( [id number] 1xLife); Future    5. Need for vaccination  Left before getting    - Shingrix Vaccine        Instructed to Follow up in clinic or ER for worsening symptoms, difficulty breathing, lack of expected recovery, or should new symptoms or problems arise.    Followup: Return in about 6 months (around 3/24/2021) for Reevaluation.       Once again this medical record contains text that has been entered with the assistance of computer voice recognition and dictation software.  Therefore, it may contain unintended errors in text, spelling, punctuation, or grammar

## 2020-09-28 DIAGNOSIS — E03.4 HYPOTHYROIDISM DUE TO ACQUIRED ATROPHY OF THYROID: ICD-10-CM

## 2020-09-29 RX ORDER — LEVOTHYROXINE SODIUM 75 UG/1
TABLET ORAL
Qty: 90 TAB | Refills: 0 | Status: SHIPPED | OUTPATIENT
Start: 2020-09-29 | End: 2021-01-05

## 2020-10-15 ENCOUNTER — HOSPITAL ENCOUNTER (OUTPATIENT)
Dept: LAB | Facility: MEDICAL CENTER | Age: 65
End: 2020-10-15
Attending: FAMILY MEDICINE
Payer: MEDICARE

## 2020-10-15 DIAGNOSIS — E03.4 HYPOTHYROIDISM DUE TO ACQUIRED ATROPHY OF THYROID: ICD-10-CM

## 2020-10-15 DIAGNOSIS — Z11.59 NEED FOR HEPATITIS C SCREENING TEST: ICD-10-CM

## 2020-10-15 DIAGNOSIS — E78.00 PURE HYPERCHOLESTEROLEMIA: ICD-10-CM

## 2020-10-15 LAB
ERYTHROCYTE [DISTWIDTH] IN BLOOD BY AUTOMATED COUNT: 47.8 FL (ref 35.9–50)
HCT VFR BLD AUTO: 46.6 % (ref 37–47)
HGB BLD-MCNC: 15.3 G/DL (ref 12–16)
MCH RBC QN AUTO: 31.7 PG (ref 27–33)
MCHC RBC AUTO-ENTMCNC: 32.8 G/DL (ref 33.6–35)
MCV RBC AUTO: 96.7 FL (ref 81.4–97.8)
PLATELET # BLD AUTO: 250 K/UL (ref 164–446)
PMV BLD AUTO: 10.4 FL (ref 9–12.9)
RBC # BLD AUTO: 4.82 M/UL (ref 4.2–5.4)
WBC # BLD AUTO: 6 K/UL (ref 4.8–10.8)

## 2020-10-15 PROCEDURE — 80061 LIPID PANEL: CPT

## 2020-10-15 PROCEDURE — 84481 FREE ASSAY (FT-3): CPT

## 2020-10-15 PROCEDURE — 36415 COLL VENOUS BLD VENIPUNCTURE: CPT

## 2020-10-15 PROCEDURE — 84439 ASSAY OF FREE THYROXINE: CPT

## 2020-10-15 PROCEDURE — 84443 ASSAY THYROID STIM HORMONE: CPT

## 2020-10-15 PROCEDURE — 85027 COMPLETE CBC AUTOMATED: CPT

## 2020-10-15 PROCEDURE — 80053 COMPREHEN METABOLIC PANEL: CPT

## 2020-10-16 LAB
ALBUMIN SERPL BCP-MCNC: 4.4 G/DL (ref 3.2–4.9)
ALBUMIN/GLOB SERPL: 1.5 G/DL
ALP SERPL-CCNC: 50 U/L (ref 30–99)
ALT SERPL-CCNC: 17 U/L (ref 2–50)
ANION GAP SERPL CALC-SCNC: 10 MMOL/L (ref 7–16)
AST SERPL-CCNC: 19 U/L (ref 12–45)
BILIRUB SERPL-MCNC: 0.5 MG/DL (ref 0.1–1.5)
BUN SERPL-MCNC: 12 MG/DL (ref 8–22)
CALCIUM SERPL-MCNC: 9.6 MG/DL (ref 8.5–10.5)
CHLORIDE SERPL-SCNC: 101 MMOL/L (ref 96–112)
CHOLEST SERPL-MCNC: 151 MG/DL (ref 100–199)
CO2 SERPL-SCNC: 26 MMOL/L (ref 20–33)
CREAT SERPL-MCNC: 0.81 MG/DL (ref 0.5–1.4)
GLOBULIN SER CALC-MCNC: 2.9 G/DL (ref 1.9–3.5)
GLUCOSE SERPL-MCNC: 105 MG/DL (ref 65–99)
HDLC SERPL-MCNC: 71 MG/DL
LDLC SERPL CALC-MCNC: 63 MG/DL
POTASSIUM SERPL-SCNC: 4.3 MMOL/L (ref 3.6–5.5)
PROT SERPL-MCNC: 7.3 G/DL (ref 6–8.2)
SODIUM SERPL-SCNC: 137 MMOL/L (ref 135–145)
T3FREE SERPL-MCNC: 2.98 PG/ML (ref 2–4.4)
T4 FREE SERPL-MCNC: 1.02 NG/DL (ref 0.93–1.7)
TRIGL SERPL-MCNC: 86 MG/DL (ref 0–149)
TSH SERPL DL<=0.005 MIU/L-ACNC: 2.09 UIU/ML (ref 0.38–5.33)

## 2020-10-29 ENCOUNTER — OFFICE VISIT (OUTPATIENT)
Dept: MEDICAL GROUP | Age: 65
End: 2020-10-29
Payer: MEDICARE

## 2020-10-29 VITALS
TEMPERATURE: 98.4 F | BODY MASS INDEX: 33.88 KG/M2 | OXYGEN SATURATION: 94 % | HEIGHT: 67 IN | HEART RATE: 72 BPM | WEIGHT: 215.83 LBS | DIASTOLIC BLOOD PRESSURE: 74 MMHG | SYSTOLIC BLOOD PRESSURE: 110 MMHG

## 2020-10-29 DIAGNOSIS — E03.4 HYPOTHYROIDISM DUE TO ACQUIRED ATROPHY OF THYROID: ICD-10-CM

## 2020-10-29 DIAGNOSIS — E66.9 OBESITY (BMI 30-39.9): ICD-10-CM

## 2020-10-29 DIAGNOSIS — Z23 NEED FOR VACCINATION: ICD-10-CM

## 2020-10-29 PROCEDURE — 99213 OFFICE O/P EST LOW 20 MIN: CPT | Mod: 25 | Performed by: FAMILY MEDICINE

## 2020-10-29 PROCEDURE — G0008 ADMIN INFLUENZA VIRUS VAC: HCPCS | Performed by: FAMILY MEDICINE

## 2020-10-29 PROCEDURE — 90662 IIV NO PRSV INCREASED AG IM: CPT | Performed by: FAMILY MEDICINE

## 2020-10-29 RX ORDER — PHENTERMINE HYDROCHLORIDE 30 MG/1
30 CAPSULE ORAL EVERY MORNING
Qty: 30 CAP | Refills: 0 | Status: SHIPPED | OUTPATIENT
Start: 2020-10-29 | End: 2020-12-09 | Stop reason: SDUPTHER

## 2020-10-29 ASSESSMENT — FIBROSIS 4 INDEX: FIB4 SCORE: 1.23

## 2020-10-29 NOTE — PROGRESS NOTES
This medical record contains text that has been entered with the assistance of computer voice recognition and dictation software.  Therefore, it may contain unintended errors in text, spelling, punctuation, or grammar      Chief Complaint   Patient presents with   • Follow-Up     weight loss         Alex Morelos is a 65 y.o. female here evaluation and management of: Follow-up on weight loss medication      HPI:     1. Obesity (BMI 30-39.9)  Patient states that the phentermine 15 mg was affected and did curb her appetite.  She states that but she has not been exercising, however she did lose 4 pounds.  She is very excited about this because she is been having a hard time losing weight in fact she has been gaining weight.  However now with the success she would like to increase the dose of the phentermine.  She denied any adverse side effects, no shortness of breath no palpitations, no anxiety, no difficulty sleeping.      Vitals 9/24/2020 10/29/2020   WEIGHT 219.58 215.83       2. Hypothyroidism due to acquired atrophy of thyroid  Levothyroxine 75 mcg p.o. daily    The patient denies any weight gain in fact she is lost weight but that is because of phentermine.  She denies any preference for cold or hot temperatures.    Results for ALEX MORELOS (MRN [id number]) as of 10/29/2020 10:08   Ref. Range 10/15/2020 08:49   TSH Latest Ref Range: 0.380 - 5.330 uIU/mL 2.090   Free T-4 Latest Ref Range: 0.93 - 1.70 ng/dL 1.02   T3,Free Latest Ref Range: 2.00 - 4.40 pg/mL 2.98       3. Need for vaccination  Due for flu vaccine    Current medicines (including changes today)  Current Outpatient Medications   Medication Sig Dispense Refill   • phentermine 30 MG capsule Take 1 Cap by mouth every morning for 30 days. 30 Cap 0   • EUTHYROX 75 MCG Tab TAKE 1 TABLET BY MOUTH IN THE MORNING ON AN EMPTY STOMACH 90 Tab 0   • meloxicam (MOBIC) 7.5 MG Tab Take 7.5 mg by mouth 2 times a day as needed. PAIN     • fluticasone (FLONASE) 50  MCG/ACT nasal spray Spray 1 Spray in nose every day. 16 g 4   • ARIPiprazole (ABILIFY) 10 MG Tab Take 1 Tab by mouth every day. 90 Tab 1   • nystatin (MYCOSTATIN) powder Apply 1 g to affected area(s) 4 times a day. 15 g 3   • DULoxetine (CYMBALTA) 30 MG Cap DR Particles Take 1 Cap by mouth every day. calms her down with pain 90 Cap 2   • NYSTOP powder APPLY 1 GRAM TO THE AFFECTED AREA(S) FOUR TIMES DAILY 15 g 0   • levothyroxine (SYNTHROID) 75 MCG Tab TAKE 1 TABLET BY MOUTH IN THE MORNING ON AN EMPTY STOMACH 90 Tab 2   • albuterol (PROVENTIL) 2.5mg/0.5ml Nebu Soln 0.5 mL by Nebulization route every four hours as needed for Shortness of Breath. 2 Bottle 3   • albuterol (PROAIR HFA) 108 (90 Base) MCG/ACT Aero Soln inhalation aerosol Inhale 2 Puffs by mouth every 6 hours as needed for Shortness of Breath. 8.5 g 3   • BACLOFEN PO Take  by mouth.     • DILTIAZem CD (CARDIZEM CD) 180 MG CAPSULE SR 24 HR Take 180 mg by mouth every day.     • calcium-vitamin D (OSCAL 500 +D) 500-200 MG-UNIT Tab Take 1 Tab by mouth 2 times a day, with meals. 360 Tab 0   • triamcinolone acetonide (KENALOG) 0.1 % Ointment Aaa bid x 14 days then stop 1 Tube 1   • budesonide-formoterol (SYMBICORT) 160-4.5 MCG/ACT Aerosol Inhale 2 Puffs by mouth. Rinse mouth after each use.     • ibuprofen (MOTRIN) 600 MG Tab Take 600 mg by mouth every 6 hours as needed.     • tramadol (ULTRAM) 50 MG Tab Take  mg by mouth every four hours as needed.     • varenicline (CHANTIX STARTING MONTH JENNY) 0.5 MG X 11 & 1 MG X 42 tablet Days 1 to 3: 0.5 mg once daily Days 4 to 7: 0.5 mg twice daily Maintenance (= Day 8): 1 mg twice daily for 11 weeks; (Patient not taking: Reported on 9/25/2019) 56 Tab 3   • predniSONE (DELTASONE) 20 MG Tab 1 tab TID x 2 days, then 1 tab BID x 5 days, then 1 tab QD x 5 (Patient not taking: Reported on 9/25/2019) 21 Tab 0     No current facility-administered medications for this visit.      She  has a past medical history of Anxiety,  "ASTHMA, Back pain, Bipolar affect, depressed (HCC), Bronchitis, Cholelithiasis, Chronic airway obstruction, not elsewhere classified, Cigarette smoker one half pack a day or less, Depression, H/O cervical spine surgery ( / ), Hyponatremia, Pneumonia, Psychiatric disorder (), Psychosis (), PTSD (post-traumatic stress disorder), Pulmonary nodule, S/P appendectomy (), and S/P repair of patent ductus arteriosus ().  She  has a past surgical history that includes other cardiac surgery; other (, ); other; appendectomy; and cristofer by laparoscopy (Bilateral, 2016).  Social History     Tobacco Use   • Smoking status: Former Smoker     Packs/day: 0.50     Years: 15.00     Pack years: 7.50     Types: Cigarettes     Start date: 1999   • Smokeless tobacco: Never Used   • Tobacco comment: quit 2 months ago    Substance Use Topics   • Alcohol use: Yes     Alcohol/week: 0.0 oz     Frequency: Monthly or less     Comment: occas glass of wine, Agrees to stop use   • Drug use: No     Social History     Social History Narrative    , 2 children.      Family History   Problem Relation Age of Onset   • Psychiatric Illness Mother         depression   • Other Mother         TIA   • Diabetes Mother    • Psychiatric Illness Sister         depression   • Cancer Father         prostate;skin     Family Status   Relation Name Status   • Mo   at age 91   • Sis     • Fa   at age 86         ROS    The pertinent  ROS findings can be seen in the HPI above.     All other systems reviewed and are negative     Objective:     /74 (BP Location: Left arm, Patient Position: Sitting, BP Cuff Size: Large adult)   Pulse 72   Temp 36.9 °C (98.4 °F) (Temporal)   Ht 1.702 m (5' 7\")   Wt 97.9 kg (215 lb 13.3 oz)   SpO2 94%  Body mass index is 33.8 kg/m².      Physical Exam:    Constitutional: Alert, no distress.  Skin: No rashes  Eye: Equal, round and reactive, conjunctiva clear, lids " normal.  ENMT: Lips without lesions, good dentition, oropharynx clear.  Neck: Trachea midline, no masses, no thyromegaly. No cervical or supraclavicular lymphadenopathy.  Respiratory: Unlabored respiratory effort, lungs clear to auscultation, no wheezes, no ronchi.  Cardiovascular: Normal S1, S2, no murmur, no edema  Abdomen: Soft, non-tender, no masses, no hepatosplenomegaly.        Assessment and Plan:   The following treatment plan was discussed      1. Obesity (BMI 30-39.9)  Patient is excited about increasing the dose for further success.  He is tolerating it without any adverse events and she is adamant about continuing.    We again discussed all side effects of phentermine including but not limited to Hypertension, ischemia, palpitations, tachycardia, Dizziness, dysphoria, euphoria, headache, insomnia, overstimulation, psychosis, restlessness, Urticaria, Change in libido, Constipation, diarrhea, gastrointestinal distress, unpleasant taste, xerostomi,  Impotence   Acquired valvular heart disease (regurgitant), primary pulmonary hypertension      - phentermine 30 MG capsule; Take 1 Cap by mouth every morning for 30 days.  Dispense: 30 Cap; Refill: 0    2. Hypothyroidism due to acquired atrophy of thyroid  Patient has been stable with current management  We will make no changes for now      3. Need for vaccination  Vaccine was administered today without adverse event.    - INFLUENZA VACCINE, HIGH DOSE (65+ ONLY)            Instructed to Follow up in clinic or ER for worsening symptoms, difficulty breathing, lack of expected recovery, or should new symptoms or problems arise.    Followup: Return in about 4 weeks (around 11/26/2020) for Medication refill.       Once again this medical record contains text that has been entered with the assistance of computer voice recognition and dictation software.  Therefore, it may contain unintended errors in text, spelling, punctuation, or grammar

## 2020-11-23 DIAGNOSIS — E66.9 OBESITY (BMI 30-39.9): ICD-10-CM

## 2020-11-23 DIAGNOSIS — B37.2 CANDIDIASIS, INTERTRIGO: ICD-10-CM

## 2020-11-23 RX ORDER — NYSTATIN 100000 U/G
CREAM TOPICAL
Status: CANCELLED | OUTPATIENT
Start: 2020-11-23

## 2020-11-23 NOTE — TELEPHONE ENCOUNTER
Received request via: Patient    Was the patient seen in the last year in this department? Yes    Does the patient have an active prescription (recently filled or refills available) for medication(s) requested? No Patient is requesting that phentermine is refilled. She was rescheduled due to Dr. Raza being out of the office and she cannot be seen till 12/09/2020

## 2020-12-01 DIAGNOSIS — B37.2 CANDIDIASIS, INTERTRIGO: ICD-10-CM

## 2020-12-01 RX ORDER — NYSTATIN 100000 [USP'U]/G
POWDER TOPICAL
Qty: 15 G | Refills: 0 | Status: SHIPPED | OUTPATIENT
Start: 2020-12-01 | End: 2022-03-03 | Stop reason: SDUPTHER

## 2020-12-02 RX ORDER — PHENTERMINE HYDROCHLORIDE 30 MG/1
30 CAPSULE ORAL EVERY MORNING
Qty: 30 CAP | Refills: 0 | OUTPATIENT
Start: 2020-12-02 | End: 2021-01-01

## 2020-12-02 RX ORDER — NYSTATIN 100000 [USP'U]/G
1 POWDER TOPICAL 4 TIMES DAILY
Qty: 15 G | Refills: 3 | OUTPATIENT
Start: 2020-12-02

## 2020-12-07 ENCOUNTER — TELEPHONE (OUTPATIENT)
Dept: MEDICAL GROUP | Age: 65
End: 2020-12-07

## 2020-12-07 NOTE — TELEPHONE ENCOUNTER
ESTABLISHED PATIENT PRE-VISIT PLANNING     Patient was NOT contacted to complete PVP.     Note: Patient will not be contacted if there is no indication to call.     1.  Reviewed notes from the last few office visits within the medical group: Yes    2.  If any orders were placed at last visit or intended to be done for this visit (i.e. 6 mos follow-up), do we have Results/Consult Notes?        •  Labs - Labs were not ordered at last office visit.   Note: If patient appointment is for lab review and patient did not complete labs, check with provider if OK to reschedule patient until labs completed.       •  Imaging - Imaging was not ordered at last office visit.       •  Referrals - No referrals were ordered at last office visit.    3. Is this appointment scheduled as a Hospital Follow-Up? No    4.  Immunizations were updated in Epic using WebIZ?: Epic matches WebIZ       •  Web Iz Recommendations: SHINGRIX (Shingles)    5.  Patient is due for the following Health Maintenance Topics:   Health Maintenance Due   Topic Date Due   • HEPATITIS C SCREENING  1955   • IMM ZOSTER VACCINES (1 of 2) 05/21/2003   • Annual Wellness Visit  05/31/2018           6. Orders for overdue Health Maintenance topics pended in Pre-Charting? N\A    7.  AHA (MDX) form printed for Provider? No, already completed    8.  Patient was NOT informed to arrive 15 min prior to their scheduled appointment and bring in their medication bottles.

## 2020-12-09 ENCOUNTER — OFFICE VISIT (OUTPATIENT)
Dept: MEDICAL GROUP | Age: 65
End: 2020-12-09
Payer: MEDICARE

## 2020-12-09 VITALS
HEART RATE: 68 BPM | BODY MASS INDEX: 33.49 KG/M2 | DIASTOLIC BLOOD PRESSURE: 82 MMHG | HEIGHT: 67 IN | WEIGHT: 213.4 LBS | SYSTOLIC BLOOD PRESSURE: 112 MMHG | OXYGEN SATURATION: 92 % | TEMPERATURE: 98.2 F

## 2020-12-09 DIAGNOSIS — L57.0 ACTINIC KERATOSIS: ICD-10-CM

## 2020-12-09 DIAGNOSIS — E66.9 OBESITY (BMI 30-39.9): ICD-10-CM

## 2020-12-09 DIAGNOSIS — L82.0 INFLAMED SEBORRHEIC KERATOSIS: ICD-10-CM

## 2020-12-09 PROCEDURE — 99213 OFFICE O/P EST LOW 20 MIN: CPT | Mod: 25 | Performed by: FAMILY MEDICINE

## 2020-12-09 PROCEDURE — 17000 DESTRUCT PREMALG LESION: CPT | Mod: 59 | Performed by: FAMILY MEDICINE

## 2020-12-09 PROCEDURE — 17003 DESTRUCT PREMALG LES 2-14: CPT | Mod: 59 | Performed by: FAMILY MEDICINE

## 2020-12-09 PROCEDURE — 17110 DESTRUCTION B9 LES UP TO 14: CPT | Performed by: FAMILY MEDICINE

## 2020-12-09 RX ORDER — PHENTERMINE HYDROCHLORIDE 30 MG/1
30 CAPSULE ORAL EVERY MORNING
Qty: 30 CAP | Refills: 0 | Status: SHIPPED | OUTPATIENT
Start: 2020-12-09 | End: 2021-01-08

## 2020-12-09 ASSESSMENT — FIBROSIS 4 INDEX: FIB4 SCORE: 1.23

## 2020-12-09 NOTE — PROGRESS NOTES
This medical record contains text that has been entered with the assistance of computer voice recognition and dictation software.  Therefore, it may contain unintended errors in text, spelling, punctuation, or grammar      Chief Complaint   Patient presents with   • Weight Loss     Phentermine refill   • Skin Lesion     forehead         Alex Shepard is a 65 y.o. female here evaluation and management of: Weight loss plan      HPI:     1. Obesity (BMI 30-39.9)    The patient has done well so far with phentermine she has not had any side effects.  Denies any palpitations no chest pain no insomnia.  Patient states that she is never lost 6 pounds for several years now.  She states she is try to cut down on eating but only phentermine seem to help with that.  She no longer binge eats.  She does not eat when she is hungry.  She has not began an exercise regimen.  She denies any chest pain, no shortness of breath no fevers chills night sweats, no numbness or tingling.    Vitals 9/24/2020 10/29/2020 12/9/2020   WEIGHT 219.58 215.83 213.4         2. Inflamed seborrheic keratosis    Patient states she got another itchy flaky spot on the top of her forehead that she would like to have sprayed.    3. AK (actinic keratosis) plan  Patient also noticed some red spots on her forehead from sun damage, she would like to have these sprayed as well.    Current medicines (including changes today)  Current Outpatient Medications   Medication Sig Dispense Refill   • phentermine 30 MG capsule Take 1 Cap by mouth every morning for 30 days. 30 Cap 0   • NYSTOP powder APPLY 1 GRAM TOPICALLY TO THE AFFECTED AREA(S) 4 TIMES DAILY 15 g 0   • EUTHYROX 75 MCG Tab TAKE 1 TABLET BY MOUTH IN THE MORNING ON AN EMPTY STOMACH 90 Tab 0   • meloxicam (MOBIC) 7.5 MG Tab Take 7.5 mg by mouth 2 times a day as needed. PAIN     • fluticasone (FLONASE) 50 MCG/ACT nasal spray Spray 1 Spray in nose every day. 16 g 4   • ARIPiprazole (ABILIFY) 10 MG Tab Take 1  Tab by mouth every day. 90 Tab 1   • DULoxetine (CYMBALTA) 30 MG Cap DR Particles Take 1 Cap by mouth every day. calms her down with pain 90 Cap 2   • NYSTOP powder APPLY 1 GRAM TO THE AFFECTED AREA(S) FOUR TIMES DAILY 15 g 0   • levothyroxine (SYNTHROID) 75 MCG Tab TAKE 1 TABLET BY MOUTH IN THE MORNING ON AN EMPTY STOMACH 90 Tab 2   • albuterol (PROVENTIL) 2.5mg/0.5ml Nebu Soln 0.5 mL by Nebulization route every four hours as needed for Shortness of Breath. 2 Bottle 3   • albuterol (PROAIR HFA) 108 (90 Base) MCG/ACT Aero Soln inhalation aerosol Inhale 2 Puffs by mouth every 6 hours as needed for Shortness of Breath. 8.5 g 3   • BACLOFEN PO Take  by mouth.     • DILTIAZem CD (CARDIZEM CD) 180 MG CAPSULE SR 24 HR Take 180 mg by mouth every day.     • calcium-vitamin D (OSCAL 500 +D) 500-200 MG-UNIT Tab Take 1 Tab by mouth 2 times a day, with meals. 360 Tab 0   • triamcinolone acetonide (KENALOG) 0.1 % Ointment Aaa bid x 14 days then stop 1 Tube 1   • ibuprofen (MOTRIN) 600 MG Tab Take 600 mg by mouth every 6 hours as needed.     • tramadol (ULTRAM) 50 MG Tab Take  mg by mouth every four hours as needed.     • varenicline (CHANTIX STARTING MONTH JENNY) 0.5 MG X 11 & 1 MG X 42 tablet Days 1 to 3: 0.5 mg once daily Days 4 to 7: 0.5 mg twice daily Maintenance (= Day 8): 1 mg twice daily for 11 weeks; (Patient not taking: Reported on 9/25/2019) 56 Tab 3   • predniSONE (DELTASONE) 20 MG Tab 1 tab TID x 2 days, then 1 tab BID x 5 days, then 1 tab QD x 5 (Patient not taking: Reported on 9/25/2019) 21 Tab 0   • budesonide-formoterol (SYMBICORT) 160-4.5 MCG/ACT Aerosol Inhale 2 Puffs by mouth. Rinse mouth after each use.       No current facility-administered medications for this visit.      She  has a past medical history of Anxiety, ASTHMA, Back pain, Bipolar affect, depressed (HCC), Bronchitis, Cholelithiasis, Chronic airway obstruction, not elsewhere classified, Cigarette smoker one half pack a day or less, Depression,  "H/O cervical spine surgery (2002), Hyponatremia, Pneumonia, Psychiatric disorder (), Psychosis (), PTSD (post-traumatic stress disorder), Pulmonary nodule, S/P appendectomy (), and S/P repair of patent ductus arteriosus ().  She  has a past surgical history that includes other cardiac surgery; other (, ); other; appendectomy; and cristofer by laparoscopy (Bilateral, 2016).  Social History     Tobacco Use   • Smoking status: Former Smoker     Packs/day: 0.50     Years: 15.00     Pack years: 7.50     Types: Cigarettes     Start date: 1999   • Smokeless tobacco: Never Used   • Tobacco comment: quit 2 months ago    Substance Use Topics   • Alcohol use: Yes     Alcohol/week: 0.0 oz     Frequency: Monthly or less     Comment: occas glass of wine, Agrees to stop use   • Drug use: No     Social History     Social History Narrative    , 2 children.      Family History   Problem Relation Age of Onset   • Psychiatric Illness Mother         depression   • Other Mother         TIA   • Diabetes Mother    • Psychiatric Illness Sister         depression   • Cancer Father         prostate;skin     Family Status   Relation Name Status   • Mo   at age 91   • Sis     • Fa   at age 86         ROS    The pertinent  ROS findings can be seen in the HPI above.     All other systems reviewed and are negative     Objective:     /82 (BP Location: Right arm, Patient Position: Sitting, BP Cuff Size: Adult)   Pulse 68   Temp 36.8 °C (98.2 °F) (Temporal)   Ht 1.702 m (5' 7\")   Wt 96.8 kg (213 lb 6.4 oz)   SpO2 92%  Body mass index is 33.42 kg/m².      Physical Exam:    Constitutional: Alert, no distress.  Skin:       ISK  Description--1 irregular rough surface plaque on top of mid forehead     Size 0.4 cm      AK  2 lesions on forehead with evidence of of tender, pink, scaly with solar damage present , spotty hyperpigmentation, scattered telangiectasias, and  " xerosis      PROCEDURE: CRYOTHERAPY  Discussed risks and benefits of cryotherapy including but not limited to scarring, hyperpigmentation, hypopigmentation, hypertrophic scarring, keiloid scarring, incomplete or no resolution of lesions treated,pain, undesirable cosemetic result, blistering, potential need for additional treatment including more invasive treatment. Patient expresses understanding and verbally acknowledges risks and consent to treatment. 2  applications of cryotherapy with 3 second freeze thaw cycle was applied to the above lesions.  Patient tolerated procedure well. There were no complications. Aftercare instructions given.  Eye: Equal, round and reactive, conjunctiva clear, lids normal.  ENMT: Lips without lesions, good dentition, oropharynx clear.  Neck: Trachea midline, no masses, no thyromegaly. No cervical or supraclavicular lymphadenopathy.  Respiratory: Unlabored respiratory effort, lungs clear to auscultation, no wheezes, no ronchi.  Cardiovascular: Normal S1, S2, no murmur, no edema  Abdomen: Soft, non-tender, no masses, no hepatosplenomegaly.        Assessment and Plan:   The following treatment plan was discussed      1. Obesity (BMI 30-39.9)  Also discussed lifestyle modification with exercise.  She would like to continue phentermine  She has no contraindications.    We discussed all side effects of phentermine including but not limited to Hypertension, ischemia, palpitations, tachycardia, Dizziness, dysphoria, euphoria, headache, insomnia, overstimulation, psychosis, restlessness, Urticaria, Change in libido, Constipation, diarrhea, gastrointestinal distress, unpleasant taste, xerostomi,  Impotence   Acquired valvular heart disease (regurgitant), primary pulmonary hypertension          - phentermine 30 MG capsule; Take 1 Cap by mouth every morning for 30 days.  Dispense: 30 Cap; Refill: 0    2. Inflamed seborrheic keratosis  Patient tolerated procedure well  There were no adverse  events  Patient was given post procedure precautions       3. AK (actinic keratosis)  Patient tolerated procedure well  There were no adverse events  Patient was given post procedure precautions                Over 25 minutes spent with patient discussing condition reviewing medical record, changing and making additions.   We discussed all medications and side effects, and overall preventative health.   More than half the time spent was counseling and coordinating care.      Instructed to Follow up in clinic or ER for worsening symptoms, difficulty breathing, lack of expected recovery, or should new symptoms or problems arise.    Followup: Return in about 4 weeks (around 1/6/2021) for Reevaluation.       Once again this medical record contains text that has been entered with the assistance of computer voice recognition and dictation software.  Therefore, it may contain unintended errors in text, spelling, punctuation, or grammar

## 2020-12-11 RX ORDER — ARIPIPRAZOLE 10 MG/1
TABLET ORAL
Qty: 90 TAB | Refills: 0 | Status: SHIPPED | OUTPATIENT
Start: 2020-12-11 | End: 2021-02-10

## 2020-12-30 RX ORDER — DULOXETIN HYDROCHLORIDE 30 MG/1
CAPSULE, DELAYED RELEASE ORAL
Qty: 90 CAP | Refills: 0 | Status: SHIPPED | OUTPATIENT
Start: 2020-12-30 | End: 2021-02-10

## 2021-01-04 DIAGNOSIS — E03.4 HYPOTHYROIDISM DUE TO ACQUIRED ATROPHY OF THYROID: ICD-10-CM

## 2021-01-05 RX ORDER — LEVOTHYROXINE SODIUM 75 UG/1
TABLET ORAL
Qty: 90 TAB | Refills: 0 | Status: SHIPPED | OUTPATIENT
Start: 2021-01-05 | End: 2021-04-07

## 2021-01-08 ENCOUNTER — OFFICE VISIT (OUTPATIENT)
Dept: MEDICAL GROUP | Age: 66
End: 2021-01-08
Payer: MEDICARE

## 2021-01-08 VITALS
RESPIRATION RATE: 16 BRPM | WEIGHT: 210.8 LBS | HEART RATE: 84 BPM | SYSTOLIC BLOOD PRESSURE: 102 MMHG | BODY MASS INDEX: 33.09 KG/M2 | DIASTOLIC BLOOD PRESSURE: 70 MMHG | OXYGEN SATURATION: 94 % | HEIGHT: 67 IN | TEMPERATURE: 98 F

## 2021-01-08 DIAGNOSIS — B37.2 CANDIDIASIS, INTERTRIGO: ICD-10-CM

## 2021-01-08 DIAGNOSIS — B37.2 CANDIDAL INTERTRIGO: ICD-10-CM

## 2021-01-08 DIAGNOSIS — Z11.59 NEED FOR HEPATITIS C SCREENING TEST: ICD-10-CM

## 2021-01-08 DIAGNOSIS — E66.9 OBESITY (BMI 30-39.9): ICD-10-CM

## 2021-01-08 PROCEDURE — 99214 OFFICE O/P EST MOD 30 MIN: CPT | Performed by: FAMILY MEDICINE

## 2021-01-08 RX ORDER — PHENTERMINE HYDROCHLORIDE 30 MG/1
30 CAPSULE ORAL EVERY MORNING
Qty: 30 CAP | Refills: 0 | Status: SHIPPED | OUTPATIENT
Start: 2021-01-08 | End: 2021-02-07

## 2021-01-08 RX ORDER — NYSTATIN 100000 [USP'U]/G
1 POWDER TOPICAL 4 TIMES DAILY
Qty: 15 G | Refills: 3 | Status: SHIPPED | OUTPATIENT
Start: 2021-01-08 | End: 2021-07-13

## 2021-01-08 RX ORDER — ALPRAZOLAM 0.5 MG/1
1 TABLET ORAL NIGHTLY PRN
COMMUNITY
Start: 2020-12-04 | End: 2022-03-03

## 2021-01-08 RX ORDER — DILTIAZEM HYDROCHLORIDE 60 MG/1
1 TABLET, FILM COATED ORAL 3 TIMES DAILY
COMMUNITY
Start: 2020-11-17 | End: 2022-03-03

## 2021-01-08 ASSESSMENT — FIBROSIS 4 INDEX: FIB4 SCORE: 1.23

## 2021-01-08 NOTE — PROGRESS NOTES
Virtual Visit: Established Patient   This visit was conducted via Zoom using secure and encrypted videoconferencing technology. The patient was in a private location in the state of [place].    The patient's identity was confirmed and verbal consent was obtained for this virtual visit.    Subjective:   CC:   Chief Complaint   Patient presents with   • Weight Loss       Alex Shepard is a 65 y.o. female presenting for evaluation and management of:    1. Obesity (BMI 30-39.9)  The patient did not lose any weight since last month however she states that the most taking encounter was the holiday season and she ate a lot and still did not gain weight.  She would like to continue phentermine and she is not having any side effects she understands all adverse events.  She denies any palpitations no chest pain no dyspnea exertion no presyncopal syncopal episodes, no fevers or chills.  No nausea or vomiting.      2. Candidal intertrigo  Patient states that the nystatin powder is working to prevent return of the rash she would like to continue.  She has not had any side effects.        4. Need for hepatitis C screening test  The patient was born between 1947 and 1967  So is due for hepatitis C screening          ROS   Denies any recent fevers or chills. No nausea or vomiting. No chest pains or shortness of breath.     Allergies   Allergen Reactions   • Penicillins      As child- has a lot of pcn- uncertain about reaction.    • Nicoderm [Nicotine] Rash     Rash where patch was placed.        Current medicines (including changes today)  Current Outpatient Medications   Medication Sig Dispense Refill   • diclofenac DR (VOLTAREN) 50 MG Tablet Delayed Response Take 50 mg by mouth every day.     • phentermine 30 MG capsule Take 1 Cap by mouth every morning for 30 days. 30 Cap 0   • nystatin (MYCOSTATIN) powder Apply 1 g topically 4 times a day. 15 g 3   • EUTHYROX 75 MCG Tab TAKE 1 TABLET BY MOUTH IN THE MORNING ON AN EMPTY STOMACH  90 Tab 0   • DULoxetine (CYMBALTA) 30 MG Cap DR Particles Take 1 capsule by mouth once daily 90 Cap 0   • ARIPiprazole (ABILIFY) 10 MG Tab Take 1 tablet by mouth once daily 90 Tab 0   • phentermine 30 MG capsule Take 1 Cap by mouth every morning for 30 days. 30 Cap 0   • NYSTOP powder APPLY 1 GRAM TOPICALLY TO THE AFFECTED AREA(S) 4 TIMES DAILY 15 g 0   • meloxicam (MOBIC) 7.5 MG Tab Take 7.5 mg by mouth 2 times a day as needed. PAIN     • fluticasone (FLONASE) 50 MCG/ACT nasal spray Spray 1 Spray in nose every day. 16 g 4   • NYSTOP powder APPLY 1 GRAM TO THE AFFECTED AREA(S) FOUR TIMES DAILY 15 g 0   • levothyroxine (SYNTHROID) 75 MCG Tab TAKE 1 TABLET BY MOUTH IN THE MORNING ON AN EMPTY STOMACH 90 Tab 2   • albuterol (PROVENTIL) 2.5mg/0.5ml Nebu Soln 0.5 mL by Nebulization route every four hours as needed for Shortness of Breath. 2 Bottle 3   • albuterol (PROAIR HFA) 108 (90 Base) MCG/ACT Aero Soln inhalation aerosol Inhale 2 Puffs by mouth every 6 hours as needed for Shortness of Breath. 8.5 g 3   • BACLOFEN PO Take  by mouth.     • DILTIAZem CD (CARDIZEM CD) 180 MG CAPSULE SR 24 HR Take 180 mg by mouth every day.     • calcium-vitamin D (OSCAL 500 +D) 500-200 MG-UNIT Tab Take 1 Tab by mouth 2 times a day, with meals. 360 Tab 0   • triamcinolone acetonide (KENALOG) 0.1 % Ointment Aaa bid x 14 days then stop 1 Tube 1   • ibuprofen (MOTRIN) 600 MG Tab Take 600 mg by mouth every 6 hours as needed.     • tramadol (ULTRAM) 50 MG Tab Take  mg by mouth every four hours as needed.     • varenicline (CHANTIX STARTING MONTH PAK) 0.5 MG X 11 & 1 MG X 42 tablet Days 1 to 3: 0.5 mg once daily Days 4 to 7: 0.5 mg twice daily Maintenance (= Day 8): 1 mg twice daily for 11 weeks; (Patient not taking: Reported on 9/25/2019) 56 Tab 3   • predniSONE (DELTASONE) 20 MG Tab 1 tab TID x 2 days, then 1 tab BID x 5 days, then 1 tab QD x 5 (Patient not taking: Reported on 9/25/2019) 21 Tab 0   • budesonide-formoterol (SYMBICORT)  160-4.5 MCG/ACT Aerosol Inhale 2 Puffs by mouth. Rinse mouth after each use.       No current facility-administered medications for this visit.        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   • Abnormal chest CT 10/22/2015   • Pulmonary nodule 09/23/2015   • Asthma 09/23/2015   • DDD (degenerative disc disease), cervical 08/31/2015   • Chronic hypoxemic respiratory failure ([facility]) 08/31/2015   • Back pain with radiation 08/26/2015   • PTSD (post-traumatic stress disorder) 08/22/2015   • Generalized pain 08/22/2015   • COPD (chronic obstructive pulmonary disease) ([facility]) 08/22/2015   • Bipolar 1 disorder ([facility]) 08/22/2015   • Obesity (BMI 30-39.9) 03/28/2015   • 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/17/2014   • Smoking history 11/19/2014   • Hyponatremia 03/04/2013   • Depression 03/04/2013   • Anxiety 03/04/2013       Family History   Problem Relation Age of Onset   • Psychiatric Illness Mother         depression   • Other Mother         TIA   • Diabetes Mother    • Psychiatric Illness Sister         depression   • Cancer Father         prostate;skin       She  has a past medical history of Anxiety, ASTHMA, Back pain, Bipolar affect, depressed ([facility]), Bronchitis, Cholelithiasis, Chronic airway obstruction, not elsewhere classified, Cigarette smoker one half pack a day or less, Depression, H/O cervical spine surgery (2002 / 2005), Hyponatremia, Pneumonia, Psychiatric disorder (2005), Psychosis  "(HCC), PTSD (post-traumatic stress disorder), Pulmonary nodule, S/P appendectomy (1968), and S/P repair of patent ductus arteriosus (1958).  She  has a past surgical history that includes other cardiac surgery; other (2002, 2005); other; appendectomy; and cristofer by laparoscopy (Bilateral, 5/21/2016).       Objective:   /70 (BP Location: Right arm, Patient Position: Sitting, BP Cuff Size: Adult)   Pulse 84   Temp 36.7 °C (98 °F) (Temporal)   Resp 16   Ht 1.702 m (5' 7\")   Wt 95.6 kg (210 lb 12.8 oz)   SpO2 94%   BMI 33.02 kg/m²     Physical Exam:  Constitutional: Alert, no distress, well-groomed.  Skin: No rashes in visible areas.  Eye: Round. Conjunctiva clear, lids normal. No icterus.   ENMT: Lips pink without lesions, good dentition, moist mucous membranes. Phonation normal.  Neck: No masses, no thyromegaly. Moves freely without pain.  Respiratory: Unlabored respiratory effort, no cough or audible wheeze  Psych: Alert and oriented x3, normal affect and mood.       Assessment and Plan:   The following treatment plan was discussed:     1. Obesity (BMI 30-39.9)  We will give her another month  Since the holidays are over  She states she will be very motivated this month    - phentermine 30 MG capsule; Take 1 Cap by mouth every morning for 30 days.  Dispense: 30 Cap; Refill: 0    2. Candidiasis, intertrigo    Patient has been stable with current management  We will make no changes for now    - nystatin (MYCOSTATIN) powder; Apply 1 g topically 4 times a day.  Dispense: 15 g; Refill: 3    3. Need for hepatitis C screening test     The [employer]PSTF recommends offering 1-time screening for HCV infection to adults born between 1945 and 1965 (baby boomers).    - HEP C VIRUS ANTIBODY; Future    Other orders  - diclofenac DR (VOLTAREN) 50 MG Tablet Delayed Response; Take 50 mg by mouth every day.        Follow-up: Return in about 3 months (around 4/8/2021) for Reevaluation, Medication refill.         "

## 2021-02-10 ENCOUNTER — TELEMEDICINE (OUTPATIENT)
Dept: MEDICAL GROUP | Age: 66
End: 2021-02-10
Payer: MEDICARE

## 2021-02-10 VITALS — WEIGHT: 206 LBS | HEIGHT: 67 IN | BODY MASS INDEX: 32.33 KG/M2

## 2021-02-10 DIAGNOSIS — M79.672 LEFT FOOT PAIN: ICD-10-CM

## 2021-02-10 DIAGNOSIS — M54.9 BACK PAIN WITH RADIATION: ICD-10-CM

## 2021-02-10 DIAGNOSIS — M50.30 DDD (DEGENERATIVE DISC DISEASE), CERVICAL: ICD-10-CM

## 2021-02-10 PROCEDURE — 99213 OFFICE O/P EST LOW 20 MIN: CPT | Mod: 95,CR | Performed by: FAMILY MEDICINE

## 2021-02-10 RX ORDER — ARIPIPRAZOLE 15 MG/1
15 TABLET ORAL DAILY
COMMUNITY
Start: 2021-01-31 | End: 2021-02-19 | Stop reason: SDUPTHER

## 2021-02-10 RX ORDER — DULOXETIN HYDROCHLORIDE 60 MG/1
60 CAPSULE, DELAYED RELEASE ORAL DAILY
COMMUNITY
Start: 2021-01-31 | End: 2021-02-19 | Stop reason: SDUPTHER

## 2021-02-10 RX ORDER — GABAPENTIN 300 MG/1
300 CAPSULE ORAL 4 TIMES DAILY
Qty: 90 CAPSULE | Refills: 2 | Status: SHIPPED | OUTPATIENT
Start: 2021-02-10 | End: 2022-03-03

## 2021-02-10 RX ORDER — CARBAMAZEPINE 200 MG/1
200 TABLET ORAL
COMMUNITY
Start: 2021-01-30 | End: 2021-03-01

## 2021-02-10 ASSESSMENT — FIBROSIS 4 INDEX: FIB4 SCORE: 1.23

## 2021-02-10 NOTE — PROGRESS NOTES
Virtual Visit: Established Patient   This visit was conducted via Zoom using secure and encrypted videoconferencing technology. The patient was in a private location in the state of [place].    The patient's identity was confirmed and verbal consent was obtained for this virtual visit.    Subjective:   CC:   Chief Complaint   Patient presents with   • Hospital Follow-up       Alex Shepard is a 65 y.o. female presenting for evaluation and management of:    1. Left foot pain  2. Back pain with radiation  3. DDD (degenerative disc disease), cervical  UNCONTROLLED CONDITION    The patient states that she was recently hospitalized for severe anxiety and then when she got out of the hospital she began to have left foot pain and cervical spine pain as well as upper back pain.  She states the left foot hurts even when at rest it is making it difficult for her to walk.  She cannot recall any trauma.  She did have an appointment with orthopedics for her cervical spine condition today but could not make it because of the inability to walk without pain.  She denies any fevers chills night sweats, no longer very anxious she states she weaned herself off of tramadol and diet pills.  She denies any chest pain, no shortness of breath no headaches no changes in vision.    ROS   Denies any recent fevers or chills. No nausea or vomiting. No chest pains or shortness of breath.     Allergies   Allergen Reactions   • Penicillins      As child- has a lot of pcn- uncertain about reaction.    • Nicoderm [Nicotine] Rash     Rash where patch was placed.        Current medicines (including changes today)  Current Outpatient Medications   Medication Sig Dispense Refill   • carBAMazepine (TEGRETOL) 200 MG Tab Take 200 mg by mouth.     • ARIPiprazole (ABILIFY) 15 MG Tab Take 15 mg by mouth every day.     • DULoxetine (CYMBALTA) 60 MG Cap DR Particles delayed-release capsule Take 60 mg by mouth every day.     • gabapentin (NEURONTIN) 300 MG Cap  Take 1 capsule by mouth 4 times a day. 90 capsule 2   • diclofenac DR (VOLTAREN) 50 MG Tablet Delayed Response Take 50 mg by mouth every day.     • nystatin (MYCOSTATIN) powder Apply 1 g topically 4 times a day. 15 g 3   • DILTIAZem (CARDIZEM) 60 MG Tab Take 1 Tab by mouth 3 times a day.     • ALPRAZolam (XANAX) 0.5 MG Tab Take 1 Tab by mouth at bedtime as needed.     • EUTHYROX 75 MCG Tab TAKE 1 TABLET BY MOUTH IN THE MORNING ON AN EMPTY STOMACH 90 Tab 0   • NYSTOP powder APPLY 1 GRAM TOPICALLY TO THE AFFECTED AREA(S) 4 TIMES DAILY 15 g 0   • meloxicam (MOBIC) 7.5 MG Tab Take 7.5 mg by mouth 2 times a day as needed. PAIN     • fluticasone (FLONASE) 50 MCG/ACT nasal spray Spray 1 Spray in nose every day. 16 g 4   • NYSTOP powder APPLY 1 GRAM TO THE AFFECTED AREA(S) FOUR TIMES DAILY 15 g 0   • levothyroxine (SYNTHROID) 75 MCG Tab TAKE 1 TABLET BY MOUTH IN THE MORNING ON AN EMPTY STOMACH 90 Tab 2   • albuterol (PROVENTIL) 2.5mg/0.5ml Nebu Soln 0.5 mL by Nebulization route every four hours as needed for Shortness of Breath. 2 Bottle 3   • albuterol (PROAIR HFA) 108 (90 Base) MCG/ACT Aero Soln inhalation aerosol Inhale 2 Puffs by mouth every 6 hours as needed for Shortness of Breath. 8.5 g 3   • BACLOFEN PO Take  by mouth.     • DILTIAZem CD (CARDIZEM CD) 180 MG CAPSULE SR 24 HR Take 180 mg by mouth every day.     • calcium-vitamin D (OSCAL 500 +D) 500-200 MG-UNIT Tab Take 1 Tab by mouth 2 times a day, with meals. 360 Tab 0   • triamcinolone acetonide (KENALOG) 0.1 % Ointment Aaa bid x 14 days then stop 1 Tube 1   • budesonide-formoterol (SYMBICORT) 160-4.5 MCG/ACT Aerosol Inhale 2 Puffs by mouth. Rinse mouth after each use.     • ibuprofen (MOTRIN) 600 MG Tab Take 600 mg by mouth every 6 hours as needed.     • tramadol (ULTRAM) 50 MG Tab Take  mg by mouth every four hours as needed.       No current facility-administered medications for this visit.       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   • Abnormal chest CT 10/22/2015   • Pulmonary nodule 09/23/2015   • Asthma 09/23/2015   • DDD (degenerative disc disease), cervical 08/31/2015   • Chronic hypoxemic respiratory failure ([facility]) 08/31/2015   • Back pain with radiation 08/26/2015   • PTSD (post-traumatic stress disorder) 08/22/2015   • Generalized pain 08/22/2015   • COPD (chronic obstructive pulmonary disease) ([facility]) 08/22/2015   • Bipolar 1 disorder ([facility]) 08/22/2015   • Obesity (BMI 30-39.9) 03/28/2015   • 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   • Smoking history 11/19/2014   • Hyponatremia 03/04/2013   • Depression 03/04/2013   • Anxiety 03/04/2013       Family History   Problem Relation Age of Onset   • Psychiatric Illness Mother         depression   • Other Mother         TIA   • Diabetes Mother    • Psychiatric Illness Sister         depression   • Cancer Father         prostate;skin       She  has a past medical history of Anxiety, ASTHMA, Back pain, Bipolar affect, depressed ([facility]), Bronchitis, Cholelithiasis, Chronic airway obstruction, not elsewhere classified, Cigarette smoker one half pack a day or less, Depression, H/O cervical spine surgery (2002 / 2005), Hyponatremia, Pneumonia, Psychiatric disorder (2005), Psychosis (HCC), PTSD (post-traumatic stress disorder), Pulmonary nodule, S/P appendectomy (1968), and S/P repair of patent ductus arteriosus (1958).  She  has a past surgical history  "that includes other cardiac surgery; other (2002, 2005); other; appendectomy; and cristofer by laparoscopy (Bilateral, 5/21/2016).       Objective:   Ht 1.702 m (5' 7\")   Wt 93.4 kg (206 lb)   BMI 32.26 kg/m²     Physical Exam:  Constitutional: Alert, no distress, well-groomed.  Skin: No rashes in visible areas.  Eye: Round. Conjunctiva clear, lids normal. No icterus.   ENMT: Lips pink without lesions, good dentition, moist mucous membranes. Phonation normal.  Neck: No masses, no thyromegaly. Moves freely without pain.  Respiratory: Unlabored respiratory effort, no cough or audible wheeze  Psych: Alert and oriented x3, normal affect and mood.       Assessment and Plan:   The following treatment plan was discussed:     1. Left foot pain    Start neurontin 300mg po tid  Obtain plain film      - gabapentin (NEURONTIN) 300 MG Cap; Take 1 capsule by mouth 4 times a day.  Dispense: 90 capsule; Refill: 2  - Patient identified as having weight management issue.  Appropriate orders and counseling given.  - DX-FOOT-COMPLETE 3+ LEFT; Future  - DX-HIP-BILATERAL-WITH PELVIS-3/4 VIEWS; Future    2. Back pain with radiation  3. DDD (degenerative disc disease), cervical  Encouraged her to return to her spine specialist  Hopefully the gabapentin will help this as well          Follow-up: Return in about 6 months (around 8/10/2021) for Reevaluation.         "

## 2021-02-11 ENCOUNTER — TELEPHONE (OUTPATIENT)
Dept: MEDICAL GROUP | Age: 66
End: 2021-02-11

## 2021-02-11 NOTE — TELEPHONE ENCOUNTER
Phone Number Called: [phone] (home)       Call outcome: Spoke to patient regarding message below.    Message: I reached out to patient because she was seen on 2/10/21 and was scheduled to see Yoly on 2/12/21. She did cancel that appointment.     She did however asked if I would pass along a message to Dr Raza,  She was Prescribed Gabapentin at her last visit and would like an alternative that is not habit forming and she also did not like some of the possible side effects of the medication.    Please advise.

## 2021-02-17 NOTE — TELEPHONE ENCOUNTER
Phone Number Called: [phone]    Call outcome: Spoke to patient regarding message below.    Message: Patient states that she is currently taking Motrin

## 2021-02-19 ENCOUNTER — PATIENT MESSAGE (OUTPATIENT)
Dept: MEDICAL GROUP | Age: 66
End: 2021-02-19

## 2021-02-19 RX ORDER — ARIPIPRAZOLE 15 MG/1
15 TABLET ORAL DAILY
Qty: 90 TABLET | Refills: 0 | Status: SHIPPED | OUTPATIENT
Start: 2021-02-19 | End: 2021-03-21

## 2021-02-19 RX ORDER — DULOXETIN HYDROCHLORIDE 60 MG/1
60 CAPSULE, DELAYED RELEASE ORAL DAILY
Qty: 90 CAPSULE | Refills: 0 | Status: SHIPPED | OUTPATIENT
Start: 2021-02-19 | End: 2021-08-12

## 2021-02-19 NOTE — PATIENT COMMUNICATION
Received request via: Patient    Was the patient seen in the last year in this department? Yes    Does the patient have an active prescription (recently filled or refills available) for medication(s) requested? No, Patient would like to go back on old medications Abilify and Cymbalta. Patient does not currently have a therapist.

## 2021-03-16 ENCOUNTER — TELEPHONE (OUTPATIENT)
Dept: MEDICAL GROUP | Age: 66
End: 2021-03-16

## 2021-03-16 NOTE — TELEPHONE ENCOUNTER
Phone Number Called: [phone] (home)       Call outcome: Spoke to patient regarding message below.    Message: Called the pharmacy, pharmacy said it was covered and PT paid for it. Called PT informed her of the message, she said it was okay to pay for the medication.

## 2021-03-16 NOTE — TELEPHONE ENCOUNTER
Regarding: FW: Prescription Question  Contact: [phone]      ----- Message -----  From: Jose Charles-Dayo, Med Ass't  Sent: 3/15/2021  12:56 PM PDT  To: Remigio Raza M.D.  Subject: RE: Prescription Question                        ----- Message from Jose Charles-Lefceline, Med Ass't sent at 3/15/2021 12:56 PM PDT -----  Please advise      ----- Message sent from Jose Marinellia-Lefceline, Med Ass't to Alex Shepard at 3/15/2021 12:56 PM -----   Good afternoon Alex,    I have forwarded your question to Dr. Raza. Once he responds someone from our team will reach out to you. In the meantime if you have any other questions or concerns please do not hesitate to contact us. Thank you!    Kind Regards,  Archie Echavarria Medical Assistant      ----- Message -----       From:Alex Shepard       Sent:3/15/2021 12:40 PM PDT         To:Physician Remigio Raza    Subject:Prescription Question    I received a letter from [employer] and they do not cover Nystop anymore.  Is there something else we can use instead.  Thank you

## 2021-04-07 DIAGNOSIS — F31.9 BIPOLAR 1 DISORDER (HCC): ICD-10-CM

## 2021-04-07 DIAGNOSIS — E03.4 HYPOTHYROIDISM DUE TO ACQUIRED ATROPHY OF THYROID: ICD-10-CM

## 2021-04-08 RX ORDER — ARIPIPRAZOLE 10 MG/1
TABLET ORAL
Qty: 90 TABLET | Refills: 0 | Status: SHIPPED | OUTPATIENT
Start: 2021-04-08 | End: 2021-07-13

## 2021-04-08 RX ORDER — LEVOTHYROXINE SODIUM 75 UG/1
TABLET ORAL
Qty: 90 TABLET | Refills: 0 | Status: SHIPPED | OUTPATIENT
Start: 2021-04-08 | End: 2021-08-12 | Stop reason: SDUPTHER

## 2021-07-13 DIAGNOSIS — F31.9 BIPOLAR 1 DISORDER (HCC): ICD-10-CM

## 2021-07-13 DIAGNOSIS — B37.2 CANDIDIASIS, INTERTRIGO: ICD-10-CM

## 2021-07-13 RX ORDER — NYSTATIN 100000 [USP'U]/G
POWDER TOPICAL
Qty: 15 G | Refills: 0 | Status: SHIPPED | OUTPATIENT
Start: 2021-07-13 | End: 2021-08-12

## 2021-07-13 RX ORDER — ARIPIPRAZOLE 10 MG/1
TABLET ORAL
Qty: 90 TABLET | Refills: 0 | Status: SHIPPED | OUTPATIENT
Start: 2021-07-13 | End: 2021-10-21

## 2021-08-12 ENCOUNTER — PATIENT MESSAGE (OUTPATIENT)
Dept: MEDICAL GROUP | Age: 66
End: 2021-08-12

## 2021-08-12 DIAGNOSIS — B37.2 CANDIDIASIS, INTERTRIGO: ICD-10-CM

## 2021-08-12 DIAGNOSIS — E03.4 HYPOTHYROIDISM DUE TO ACQUIRED ATROPHY OF THYROID: ICD-10-CM

## 2021-08-12 RX ORDER — NYSTATIN 100000 [USP'U]/G
POWDER TOPICAL
Qty: 15 G | Refills: 0 | Status: SHIPPED | OUTPATIENT
Start: 2021-08-12 | End: 2021-09-13

## 2021-08-12 RX ORDER — DULOXETIN HYDROCHLORIDE 60 MG/1
CAPSULE, DELAYED RELEASE ORAL
Qty: 90 CAPSULE | Refills: 0 | Status: SHIPPED | OUTPATIENT
Start: 2021-08-12 | End: 2021-11-17

## 2021-08-13 RX ORDER — LEVOTHYROXINE SODIUM 0.07 MG/1
TABLET ORAL
Qty: 90 TABLET | Refills: 1 | Status: SHIPPED | OUTPATIENT
Start: 2021-08-13 | End: 2022-02-15

## 2021-09-13 DIAGNOSIS — B37.2 CANDIDIASIS, INTERTRIGO: ICD-10-CM

## 2021-09-14 RX ORDER — NYSTATIN 100000 [USP'U]/G
POWDER TOPICAL
Qty: 15 G | Refills: 3 | Status: SHIPPED | OUTPATIENT
Start: 2021-09-14 | End: 2022-02-15

## 2021-10-01 ENCOUNTER — PATIENT MESSAGE (OUTPATIENT)
Dept: MEDICAL GROUP | Age: 66
End: 2021-10-01

## 2021-10-01 RX ORDER — FLUTICASONE PROPIONATE 50 MCG
1 SPRAY, SUSPENSION (ML) NASAL DAILY
Qty: 16 G | Refills: 4 | Status: SHIPPED | OUTPATIENT
Start: 2021-10-01

## 2021-10-01 NOTE — TELEPHONE ENCOUNTER
From: Alex Shepard  To: Physician Remigio Raza  Sent: 10/1/2021 7:09 AM PDT  Subject: Flonase    Allergies are kicking up. Could I please get a refill of Flonase. Thank you

## 2021-10-20 DIAGNOSIS — F31.9 BIPOLAR 1 DISORDER (HCC): ICD-10-CM

## 2021-10-21 RX ORDER — ARIPIPRAZOLE 10 MG/1
TABLET ORAL
Qty: 90 TABLET | Refills: 0 | Status: SHIPPED | OUTPATIENT
Start: 2021-10-21 | End: 2022-01-20

## 2021-11-17 RX ORDER — DULOXETIN HYDROCHLORIDE 60 MG/1
CAPSULE, DELAYED RELEASE ORAL
Qty: 90 CAPSULE | Refills: 0 | Status: SHIPPED | OUTPATIENT
Start: 2021-11-17 | End: 2022-02-15

## 2022-01-20 DIAGNOSIS — F31.9 BIPOLAR 1 DISORDER (HCC): ICD-10-CM

## 2022-01-20 RX ORDER — ARIPIPRAZOLE 10 MG/1
TABLET ORAL
Qty: 90 TABLET | Refills: 0 | Status: SHIPPED | OUTPATIENT
Start: 2022-01-20 | End: 2022-04-22

## 2022-02-14 DIAGNOSIS — E03.4 HYPOTHYROIDISM DUE TO ACQUIRED ATROPHY OF THYROID: ICD-10-CM

## 2022-02-14 DIAGNOSIS — B37.2 CANDIDIASIS, INTERTRIGO: ICD-10-CM

## 2022-02-15 RX ORDER — NYSTATIN 100000 [USP'U]/G
POWDER TOPICAL
Qty: 15 G | Refills: 0 | Status: SHIPPED | OUTPATIENT
Start: 2022-02-15 | End: 2022-03-03

## 2022-02-15 RX ORDER — LEVOTHYROXINE SODIUM 0.07 MG/1
TABLET ORAL
Qty: 30 TABLET | Refills: 0 | Status: SHIPPED | OUTPATIENT
Start: 2022-02-15 | End: 2022-03-15

## 2022-02-15 RX ORDER — DULOXETIN HYDROCHLORIDE 60 MG/1
CAPSULE, DELAYED RELEASE ORAL
Qty: 30 CAPSULE | Refills: 0 | Status: SHIPPED | OUTPATIENT
Start: 2022-02-15 | End: 2022-03-24

## 2022-02-15 NOTE — TELEPHONE ENCOUNTER
Received request via: Pharmacy    Was the patient seen in the last year in this department? No  2/10/21    Does the patient have an active prescription (recently filled or refills available) for medication(s) requested? No

## 2022-02-28 SDOH — ECONOMIC STABILITY: HOUSING INSECURITY
IN THE LAST 12 MONTHS, WAS THERE A TIME WHEN YOU DID NOT HAVE A STEADY PLACE TO SLEEP OR SLEPT IN A SHELTER (INCLUDING NOW)?: NO

## 2022-02-28 SDOH — ECONOMIC STABILITY: TRANSPORTATION INSECURITY
IN THE PAST 12 MONTHS, HAS LACK OF TRANSPORTATION KEPT YOU FROM MEETINGS, WORK, OR FROM GETTING THINGS NEEDED FOR DAILY LIVING?: YES

## 2022-02-28 SDOH — HEALTH STABILITY: PHYSICAL HEALTH: ON AVERAGE, HOW MANY MINUTES DO YOU ENGAGE IN EXERCISE AT THIS LEVEL?: 0 MIN

## 2022-02-28 SDOH — ECONOMIC STABILITY: HOUSING INSECURITY: IN THE LAST 12 MONTHS, HOW MANY PLACES HAVE YOU LIVED?: 1

## 2022-02-28 SDOH — ECONOMIC STABILITY: INCOME INSECURITY: IN THE LAST 12 MONTHS, WAS THERE A TIME WHEN YOU WERE NOT ABLE TO PAY THE MORTGAGE OR RENT ON TIME?: NO

## 2022-02-28 SDOH — ECONOMIC STABILITY: FOOD INSECURITY: WITHIN THE PAST 12 MONTHS, THE FOOD YOU BOUGHT JUST DIDN'T LAST AND YOU DIDN'T HAVE MONEY TO GET MORE.: NEVER TRUE

## 2022-02-28 SDOH — ECONOMIC STABILITY: TRANSPORTATION INSECURITY
IN THE PAST 12 MONTHS, HAS LACK OF RELIABLE TRANSPORTATION KEPT YOU FROM MEDICAL APPOINTMENTS, MEETINGS, WORK OR FROM GETTING THINGS NEEDED FOR DAILY LIVING?: YES

## 2022-02-28 SDOH — HEALTH STABILITY: PHYSICAL HEALTH: ON AVERAGE, HOW MANY DAYS PER WEEK DO YOU ENGAGE IN MODERATE TO STRENUOUS EXERCISE (LIKE A BRISK WALK)?: 0 DAYS

## 2022-02-28 SDOH — ECONOMIC STABILITY: INCOME INSECURITY: HOW HARD IS IT FOR YOU TO PAY FOR THE VERY BASICS LIKE FOOD, HOUSING, MEDICAL CARE, AND HEATING?: NOT VERY HARD

## 2022-02-28 SDOH — ECONOMIC STABILITY: TRANSPORTATION INSECURITY
IN THE PAST 12 MONTHS, HAS THE LACK OF TRANSPORTATION KEPT YOU FROM MEDICAL APPOINTMENTS OR FROM GETTING MEDICATIONS?: YES

## 2022-02-28 SDOH — HEALTH STABILITY: MENTAL HEALTH
STRESS IS WHEN SOMEONE FEELS TENSE, NERVOUS, ANXIOUS, OR CAN'T SLEEP AT NIGHT BECAUSE THEIR MIND IS TROUBLED. HOW STRESSED ARE YOU?: NOT AT ALL

## 2022-02-28 ASSESSMENT — SOCIAL DETERMINANTS OF HEALTH (SDOH)
HOW HARD IS IT FOR YOU TO PAY FOR THE VERY BASICS LIKE FOOD, HOUSING, MEDICAL CARE, AND HEATING?: NOT VERY HARD
HOW OFTEN DO YOU GET TOGETHER WITH FRIENDS OR RELATIVES?: THREE TIMES A WEEK
IN A TYPICAL WEEK, HOW MANY TIMES DO YOU TALK ON THE PHONE WITH FAMILY, FRIENDS, OR NEIGHBORS?: THREE TIMES A WEEK
HOW OFTEN DO YOU GET TOGETHER WITH FRIENDS OR RELATIVES?: THREE TIMES A WEEK
HOW OFTEN DO YOU ATTENT MEETINGS OF THE CLUB OR ORGANIZATION YOU BELONG TO?: NEVER
HOW OFTEN DO YOU ATTENT MEETINGS OF THE CLUB OR ORGANIZATION YOU BELONG TO?: NEVER
HOW OFTEN DO YOU HAVE A DRINK CONTAINING ALCOHOL: MONTHLY OR LESS
DO YOU BELONG TO ANY CLUBS OR ORGANIZATIONS SUCH AS CHURCH GROUPS UNIONS, FRATERNAL OR ATHLETIC GROUPS, OR SCHOOL GROUPS?: NO
HOW OFTEN DO YOU HAVE SIX OR MORE DRINKS ON ONE OCCASION: NEVER
IN A TYPICAL WEEK, HOW MANY TIMES DO YOU TALK ON THE PHONE WITH FAMILY, FRIENDS, OR NEIGHBORS?: THREE TIMES A WEEK
DO YOU BELONG TO ANY CLUBS OR ORGANIZATIONS SUCH AS CHURCH GROUPS UNIONS, FRATERNAL OR ATHLETIC GROUPS, OR SCHOOL GROUPS?: NO

## 2022-02-28 ASSESSMENT — LIFESTYLE VARIABLES
HOW OFTEN DO YOU HAVE A DRINK CONTAINING ALCOHOL: MONTHLY OR LESS
HOW OFTEN DO YOU HAVE SIX OR MORE DRINKS ON ONE OCCASION: NEVER

## 2022-03-03 ENCOUNTER — OFFICE VISIT (OUTPATIENT)
Dept: MEDICAL GROUP | Age: 67
End: 2022-03-03
Payer: MEDICARE

## 2022-03-03 VITALS
SYSTOLIC BLOOD PRESSURE: 120 MMHG | BODY MASS INDEX: 36.73 KG/M2 | DIASTOLIC BLOOD PRESSURE: 80 MMHG | WEIGHT: 234 LBS | TEMPERATURE: 97.9 F | HEIGHT: 67 IN | HEART RATE: 81 BPM | OXYGEN SATURATION: 91 % | RESPIRATION RATE: 18 BRPM

## 2022-03-03 DIAGNOSIS — Z12.31 ENCOUNTER FOR SCREENING MAMMOGRAM FOR BREAST CANCER: ICD-10-CM

## 2022-03-03 DIAGNOSIS — G25.2 INTENTION TREMOR: ICD-10-CM

## 2022-03-03 DIAGNOSIS — E03.4 HYPOTHYROIDISM DUE TO ACQUIRED ATROPHY OF THYROID: ICD-10-CM

## 2022-03-03 DIAGNOSIS — Z00.00 MEDICARE ANNUAL WELLNESS VISIT, INITIAL: ICD-10-CM

## 2022-03-03 DIAGNOSIS — L82.0 INFLAMED SEBORRHEIC KERATOSIS: ICD-10-CM

## 2022-03-03 DIAGNOSIS — Z11.59 NEED FOR HEPATITIS C SCREENING TEST: ICD-10-CM

## 2022-03-03 DIAGNOSIS — B37.2 CANDIDAL INTERTRIGO: ICD-10-CM

## 2022-03-03 DIAGNOSIS — E78.00 PURE HYPERCHOLESTEROLEMIA: ICD-10-CM

## 2022-03-03 PROCEDURE — 17110 DESTRUCTION B9 LES UP TO 14: CPT | Performed by: FAMILY MEDICINE

## 2022-03-03 PROCEDURE — G0439 PPPS, SUBSEQ VISIT: HCPCS | Performed by: FAMILY MEDICINE

## 2022-03-03 RX ORDER — NYSTATIN 100000 [USP'U]/G
POWDER TOPICAL
Qty: 15 G | Refills: 0 | Status: SHIPPED | OUTPATIENT
Start: 2022-03-03 | End: 2022-04-05

## 2022-03-03 RX ORDER — TRIAMCINOLONE ACETONIDE 1 MG/G
OINTMENT TOPICAL
Qty: 1 EACH | Refills: 1 | Status: SHIPPED | OUTPATIENT
Start: 2022-03-03

## 2022-03-03 RX ORDER — PROPRANOLOL HCL 60 MG
60 CAPSULE, EXTENDED RELEASE 24HR ORAL DAILY
Qty: 30 CAPSULE | Refills: 11 | Status: SHIPPED | OUTPATIENT
Start: 2022-03-03 | End: 2023-02-15 | Stop reason: SDUPTHER

## 2022-03-03 ASSESSMENT — ENCOUNTER SYMPTOMS: GENERAL WELL-BEING: GOOD

## 2022-03-03 ASSESSMENT — ACTIVITIES OF DAILY LIVING (ADL): BATHING_REQUIRES_ASSISTANCE: 0

## 2022-03-03 ASSESSMENT — PATIENT HEALTH QUESTIONNAIRE - PHQ9: CLINICAL INTERPRETATION OF PHQ2 SCORE: 0

## 2022-03-03 ASSESSMENT — FIBROSIS 4 INDEX: FIB4 SCORE: 1.25

## 2022-03-03 NOTE — PROGRESS NOTES
Chief Complaint   Patient presents with   • Annual Exam       HPI:  Alex is a 66 y.o. here for Medicare Annual Wellness Visit        Patient Active Problem List    Diagnosis Date Noted   • Intention tremor 03/03/2022   • Left foot pain 02/10/2021   • Annual physical exam 04/12/2019   • Folliculitis 06/20/2018   • Visit for suture removal 12/27/2017   • Neoplasm of uncertain behavior 12/13/2017   • AK (actinic keratosis) 11/10/2017   • Osteopenia of spine 10/20/2017   • Need for vaccination 10/20/2017   • Candidal intertrigo 10/20/2017   • Angular cheilitis 10/20/2017   • Inflamed seborrheic keratosis 10/20/2017   • Elevated hemoglobin (HCC) 06/20/2017   • Hypothyroidism due to acquired atrophy of thyroid 11/15/2016   • 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   • Abnormal chest CT 10/22/2015   • Pulmonary nodule 09/23/2015   • Asthma 09/23/2015   • DDD (degenerative disc disease), cervical 08/31/2015   • Chronic hypoxemic respiratory failure ([facility]) 08/31/2015   • Back pain with radiation 08/26/2015   • PTSD (post-traumatic stress disorder) 08/22/2015   • Generalized pain 08/22/2015   • COPD (chronic obstructive pulmonary disease) ([facility]) 08/22/2015   • Bipolar 1 disorder ([facility]) 08/22/2015   • Obesity (BMI 30-39.9) 03/28/2015   • Mixed restrictive and obstructive lung disease ([facility]) 12/17/2014   • Smoking history 11/19/2014   • Hyponatremia 03/04/2013   • Depression 03/04/2013   • Anxiety 03/04/2013       Current Outpatient Medications   Medication Sig Dispense Refill   • triamcinolone acetonide (KENALOG) 0.1 % Ointment Aaa bid x 14 days then stop 1 Each 1   • nystatin (NYSTOP) powder APPLY 1 GRAM TOPICALLY TO THE AFFECTED AREA(S) 4 TIMES DAILY 15 g 0   • propranolol LA (INDERAL LA) 60 MG CAPSULE SR 24 HR Take 1 Capsule by mouth every day. 30 Capsule 11   • levothyroxine (EUTHYROX) 75 MCG Tab TAKE 1 TABLET BY MOUTH IN THE MORNING ON AN EMPTY STOMACH 30 Tablet 0   • DULoxetine (CYMBALTA) 60 MG Cap DR Particles delayed-release capsule  Take 1 capsule by mouth once daily 30 Capsule 0   • ARIPiprazole (ABILIFY) 10 MG Tab Take 1 tablet by mouth once daily 90 Tablet 0   • fluticasone (FLONASE) 50 MCG/ACT nasal spray Administer 1 Spray into affected nostril(S) every day. 16 g 4   • albuterol (PROVENTIL) 2.5mg/0.5ml Nebu Soln 0.5 mL by Nebulization route every four hours as needed for Shortness of Breath. 2 Bottle 3   • albuterol (PROAIR HFA) 108 (90 Base) MCG/ACT Aero Soln inhalation aerosol Inhale 2 Puffs by mouth every 6 hours as needed for Shortness of Breath. 8.5 g 3   • DILTIAZem CD (CARDIZEM CD) 180 MG CAPSULE SR 24 HR Take 180 mg by mouth every day.     • calcium-vitamin D (OSCAL 500 +D) 500-200 MG-UNIT Tab Take 1 Tab by mouth 2 times a day, with meals. 360 Tab 0   • budesonide-formoterol (SYMBICORT) 160-4.5 MCG/ACT Aerosol Inhale 2 Puffs. Rinse mouth after each use.     • NYSTOP powder APPLY 1 GRAM TOPICALLY 4 TIMES DAILY (Patient not taking: Reported on 3/3/2022) 15 g 0     No current facility-administered medications for this visit.        Patient is taking medications as noted in medication list.  Current supplements as per medication list.     Allergies: Penicillins and Nicoderm [nicotine]    Current social contact/activities: yes     Is patient current with immunizations? Yes.    She  reports that she has quit smoking. Her smoking use included cigarettes. She started smoking about 23 years ago. She has a 7.50 pack-year smoking history. She has never used smokeless tobacco. She reports current alcohol use. She reports that she does not use drugs.  Counseling given: Not Answered  Comment: quit 2 months ago       DPA/Advanced directive: Patient has Advanced Directive, but it is not on file. Instructed to bring in a copy to scan into their chart.    ROS:    Gait: Uses no assistive device   Ostomy: No   Other tubes: No   Amputations: No   Chronic oxygen use No   Last eye exam 6/2021   Wears hearing aids: No   : Denies any urinary leakage  during the last 6 months      Screening:    Depression Screening  Little interest or pleasure in doing things?  0 - not at all  Feeling down, depressed, or hopeless? 0 - not at all  Trouble falling or staying asleep, or sleeping too much?     Feeling tired or having little energy?     Poor appetite or overeating?     Feeling bad about yourself - or that you are a failure or have let yourself or your family down?    Trouble concentrating on things, such as reading the newspaper or watching television?    Moving or speaking so slowly that other people could have noticed.  Or the opposite - being so fidgety or restless that you have been moving around a lot more than usual?     Thoughts that you would be better off dead, or of hurting yourself?     Patient Health Questionnaire Score:      If depressive symptoms identified deferred to follow up visit unless specifically addressed in assessment and plan.    Interpretation of PHQ-9 Total Score   Score Severity   1-4 No Depression   5-9 Mild Depression   10-14 Moderate Depression   15-19 Moderately Severe Depression   20-27 Severe Depression      Screening for Cognitive Impairment  Three Minute Recall (daughter, heaven, betty)  3/3    Draw clock face with all 12 numbers and set the hands to show 10 past 11.  Yes 4/5  If cognitive concerns identified, deferred for follow up unless specifically addressed in assessment and plan.    Fall Risk Assessment  Has the patient had two or more falls in the last year or any fall with injury in the last year?  No  If fall risk identified, deferred for follow up unless specifically addressed in assessment and plan.    Safety Assessment  Throw rugs on floor.  No  Handrails on all stairs.  Yes  Good lighting in all hallways.  Yes  Difficulty hearing.  No  Patient counseled about all safety risks that were identified.    Functional Assessment ADLs  Are there any barriers preventing you from cooking for yourself or meeting nutritional  needs?  No.    Are there any barriers preventing you from driving safely or obtaining transportation?  No.    Are there any barriers preventing you from using a telephone or calling for help?  No.    Are there any barriers preventing you from shopping?  No.    Are there any barriers preventing you from taking care of your own finances?  No.    Are there any barriers preventing you from managing your medications?    No.    Are there any barriers preventing you from showering, bathing or dressing yourself?  No.    Are you currently engaging in any exercise or physical activity?  Yes.  Stretching program  What is your perception of your health?  Good.    Health Maintenance Summary          Ordered - HEPATITIS C SCREENING (Once) Ordered on 3/3/2022    No completion history exists for this topic.          Overdue - IMM ZOSTER VACCINES (1 of 2) Overdue - never done    No completion history exists for this topic.          Ordered - MAMMOGRAM (Yearly) Ordered on 3/3/2022    12/21/2018  MA-SCREENING MAMMO BILAT W/TOMOSYNTHESIS W/CAD    09/29/2017  MA-MAMMO SCREENING BILAT W/GLADYS W/CAD    10/26/2015  Postponed - pt elects to postpone 1 yr    09/16/2013  MA-SCREENING MAMMOGRAM W/ CAD    04/22/2008  MA-SCREENING DIGITAL MAMMO    Only the first 5 history entries have been loaded, but more history exists.          Overdue - Annual Pulmonary Function Test / Spirometry (Yearly) Overdue since 5/20/2020 05/20/2019  ZZZPFT DICTATED RESULTS    12/12/2014  PFT DICTATED RESULTS          Overdue - IMM INFLUENZA (1) Overdue since 9/1/2021    10/29/2020  Imm Admin: Influenza Vaccine Adult HD    09/25/2019  Imm Admin: Influenza Vaccine Adult HD    10/12/2018  Imm Admin: Influenza Vaccine Adult HD    10/20/2017  Imm Admin: Influenza Vaccine Quad Inj (Pf)    11/15/2016  Imm Admin: Influenza Vaccine Quad Inj (Pf)    Only the first 5 history entries have been loaded, but more history exists.          Overdue - COVID-19 Vaccine (3 - Booster  for Moderna series) Overdue since 3/1/2022    10/01/2021  Imm Admin: Moderna SARS-CoV-2 Vaccine    09/03/2021  Imm Admin: Moderna SARS-CoV-2 Vaccine          BONE DENSITY (Every 5 Years) Tentatively due on 9/29/2022 09/29/2017  DS-BONE DENSITY STUDY (DEXA)          Annual Wellness Visit (Every 366 Days) Next due on 3/4/2023    03/03/2022  Visit Dx: Medicare annual wellness visit, initial    05/30/2017  Visit Dx: Medicare annual wellness visit, subsequent    10/26/2015  Done    10/26/2015  Visit Dx: Medicare annual wellness visit, subsequent          COLORECTAL CANCER SCREENING (COLONOSCOPY - Every 10 Years) Tentatively due on 11/29/2027 11/29/2017  REFERRAL TO GI FOR COLONOSCOPY    09/17/2015  OCCULT BLOOD FECES IMMUNOASSAY    11/19/2014  COLONOSCOPY (Patient Declined)          IMM DTaP/Tdap/Td Vaccine (2 - Td or Tdap) Next due on 5/29/2029 05/29/2019  Imm Admin: Tdap Vaccine          IMM PNEUMOCOCCAL VACCINE: 65+ Years (Series Information) Completed    09/25/2019  Imm Admin: Pneumococcal polysaccharide vaccine (PPSV-23)    10/20/2017  Imm Admin: Pneumococcal Conjugate Vaccine (Prevnar/PCV-13)    09/29/2014  Imm Admin: Pneumococcal polysaccharide vaccine (PPSV-23)          IMM HEP B VACCINE (Series Information) Aged Out    No completion history exists for this topic.          IMM MENINGOCOCCAL VACCINE (MCV4) (Series Information) Aged Out    No completion history exists for this topic.          Discontinued - PAP SMEAR  Discontinued    07/20/2017  THINPREP PAP WITH HPV    07/20/2017  PATHOLOGY GYN SPECIMEN                Patient Care Team:  Remigio Raza M.D. as PCP - General (Family Medicine)  Aniceto Beck D.O. as Consulting Physician (Phys Med and Rehab)  Justen Mcdonough, RRT (Inactive) as Consulting Physician  DENNIS Cortez as Mid Level Provider (Family Medicine)    Social History     Tobacco Use   • Smoking status: Former Smoker     Packs/day: 0.50     Years: 15.00     Pack years: 7.50      "Types: Cigarettes     Start date: 1999   • Smokeless tobacco: Never Used   • Tobacco comment: quit 2 months ago    Vaping Use   • Vaping Use: Never used   Substance Use Topics   • Alcohol use: Yes     Alcohol/week: 0.0 oz     Comment: occas glass of wine, Agrees to stop use   • Drug use: No     Family History   Problem Relation Age of Onset   • Psychiatric Illness Mother         depression   • Other Mother         TIA   • Diabetes Mother    • Psychiatric Illness Sister         depression   • Cancer Father         prostate;skin     She  has a past medical history of Anxiety, ASTHMA, Back pain, Bipolar affect, depressed ([facility]), Bronchitis, Cholelithiasis, Chronic airway obstruction, not elsewhere classified, Cigarette smoker one half pack a day or less, Depression, H/O cervical spine surgery (2002), Hyponatremia, Pneumonia, Psychiatric disorder (), Psychosis (), PTSD (post-traumatic stress disorder), Pulmonary nodule, S/P appendectomy (), and S/P repair of patent ductus arteriosus ().   Past Surgical History:   Procedure Laterality Date   • STAR BY LAPAROSCOPY Bilateral 2016    Procedure: STAR BY LAPAROSCOPY;  Surgeon: Hany Encinas M.D.;  Location: SURGERY [facility];  Service:    • APPENDECTOMY     • OTHER  2005    neck surgery   • OTHER      , took out ovary or cyst   • OTHER CARDIAC SURGERY      open heart in -          Exam:   /80 (BP Location: Right arm, Patient Position: Sitting, BP Cuff Size: Large adult)   Pulse 81   Temp 36.6 °C (97.9 °F) (Temporal)   Resp 18   Ht 1.702 m (5' 7\")   Wt 106 kg (234 lb)   SpO2 91%  Body mass index is 36.65 kg/m².    Hearing good.    Dentition good  Alert, oriented in no acute distress  Eye contact is good, speech goal directed, affect calm    SKIN EXAM    ISK  Description--2, 4 cm round rough raised verrucous plaques on lower abdomen irregular, pigmented, verrucous surface plaques with dried " hemmorhage            PROCEDURE: CRYOTHERAPY  Discussed risks and benefits of cryotherapy including but not limited to scarring, hyperpigmentation, hypopigmentation, hypertrophic scarring, keiloid scarring, incomplete or no resolution of lesions treated,pain, undesirable cosemetic result, blistering, potential need for additional treatment including more invasive treatment. Patient expresses understanding and verbally acknowledges risks and consent to treatment. 2  applications of cryotherapy with 3 second freeze thaw cycle was applied to the above lesions.  Patient tolerated procedure well. There were no complications. Aftercare instructions given.    Assessment and Plan. The following treatment and monitoring plan is recommended:    1. Medicare annual wellness visit, initial      Completed      2. Hypothyroidism due to acquired atrophy of thyroid    We will obtain new labs to update clinical profile.  Then we will adjust therapy as needed.    - TSH+FREE T4  - T3 FREE; Future    3. Pure hypercholesterolemia    We will obtain new labs to update clinical profile.  Then we will adjust therapy as needed.    - Comp Metabolic Panel; Future  - CBC WITH DIFFERENTIAL; Future  - Lipid Profile; Future    4. Need for hepatitis C screening test    The patient was born between 1947 and 1967  So is due for hepatitis C screening       The USPSTF recommends offering 1-time screening for HCV infection to adults born between 1945 and 1965 (baby boomers).  - HEP C VIRUS ANTIBODY; Future    5. Encounter for screening mammogram for breast cancer  - MA-SCREENING MAMMO BILAT W/CAD; Future    6. Candidal intertrigo  - triamcinolone acetonide (KENALOG) 0.1 % Ointment; Aaa bid x 14 days then stop  Dispense: 1 Each; Refill: 1  - nystatin (NYSTOP) powder; APPLY 1 GRAM TOPICALLY TO THE AFFECTED AREA(S) 4 TIMES DAILY  Dispense: 15 g; Refill: 0    7. Intention tremor  - propranolol LA (INDERAL LA) 60 MG CAPSULE SR 24 HR; Take 1 Capsule by mouth  every day.  Dispense: 30 Capsule; Refill: 11    8. Inflamed seborrheic keratosis     Patient tolerated procedure well  There were no adverse events  Patient was given post procedure precautions     Services suggested: No services needed at this time  Health Care Screening recommendations as per orders if indicated.  Referrals offered: PT/OT/Nutrition counseling/Behavioral Health/Smoking cessation as per orders if indicated.    Discussion today about general wellness and lifestyle habits:    · Prevent falls and reduce trip hazards; Cautioned about securing or removing rugs.  · Have a working fire alarm and carbon monoxide detector;   · Engage in regular physical activity and social activities.     Follow-up: Return in about 6 months (around 9/3/2022) for Reevaluation, labs.

## 2022-03-11 LAB
ALBUMIN SERPL-MCNC: 4.4 G/DL (ref 3.8–4.8)
ALBUMIN/GLOB SERPL: 1.6 {RATIO} (ref 1.2–2.2)
ALP SERPL-CCNC: 56 IU/L (ref 44–121)
ALT SERPL-CCNC: 17 IU/L (ref 0–32)
AST SERPL-CCNC: 20 IU/L (ref 0–40)
BASOPHILS # BLD AUTO: 0.1 X10E3/UL (ref 0–0.2)
BASOPHILS NFR BLD AUTO: 1 %
BILIRUB SERPL-MCNC: 0.7 MG/DL (ref 0–1.2)
BUN SERPL-MCNC: 9 MG/DL (ref 8–27)
BUN/CREAT SERPL: 9 (ref 12–28)
CALCIUM SERPL-MCNC: 9.4 MG/DL (ref 8.7–10.3)
CHLORIDE SERPL-SCNC: 102 MMOL/L (ref 96–106)
CHOLEST SERPL-MCNC: 174 MG/DL (ref 100–199)
CO2 SERPL-SCNC: 24 MMOL/L (ref 20–29)
CREAT SERPL-MCNC: 0.96 MG/DL (ref 0.57–1)
EGFRCR SERPLBLD CKD-EPI 2021: 64 ML/MIN/1.73
EOSINOPHIL # BLD AUTO: 0.2 X10E3/UL (ref 0–0.4)
EOSINOPHIL NFR BLD AUTO: 2 %
ERYTHROCYTE [DISTWIDTH] IN BLOOD BY AUTOMATED COUNT: 13.1 % (ref 11.7–15.4)
GLOBULIN SER CALC-MCNC: 2.8 G/DL (ref 1.5–4.5)
GLUCOSE SERPL-MCNC: 119 MG/DL (ref 65–99)
HCT VFR BLD AUTO: 46.7 % (ref 34–46.6)
HCV AB S/CO SERPL IA: <0.1 S/CO RATIO (ref 0–0.9)
HDLC SERPL-MCNC: 61 MG/DL
HGB BLD-MCNC: 16.5 G/DL (ref 11.1–15.9)
IMM GRANULOCYTES # BLD AUTO: 0 X10E3/UL (ref 0–0.1)
IMM GRANULOCYTES NFR BLD AUTO: 0 %
IMMATURE CELLS  115398: ABNORMAL
LABORATORY COMMENT REPORT: NORMAL
LDLC SERPL CALC-MCNC: 92 MG/DL (ref 0–99)
LYMPHOCYTES # BLD AUTO: 1.5 X10E3/UL (ref 0.7–3.1)
LYMPHOCYTES NFR BLD AUTO: 23 %
MCH RBC QN AUTO: 33.1 PG (ref 26.6–33)
MCHC RBC AUTO-ENTMCNC: 35.3 G/DL (ref 31.5–35.7)
MCV RBC AUTO: 94 FL (ref 79–97)
MONOCYTES # BLD AUTO: 0.4 X10E3/UL (ref 0.1–0.9)
MONOCYTES NFR BLD AUTO: 6 %
MORPHOLOGY BLD-IMP: ABNORMAL
NEUTROPHILS # BLD AUTO: 4.4 X10E3/UL (ref 1.4–7)
NEUTROPHILS NFR BLD AUTO: 68 %
NRBC BLD AUTO-RTO: ABNORMAL %
PLATELET # BLD AUTO: 255 X10E3/UL (ref 150–450)
POTASSIUM SERPL-SCNC: 4.5 MMOL/L (ref 3.5–5.2)
PROT SERPL-MCNC: 7.2 G/DL (ref 6–8.5)
RBC # BLD AUTO: 4.98 X10E6/UL (ref 3.77–5.28)
SODIUM SERPL-SCNC: 138 MMOL/L (ref 134–144)
T3FREE SERPL-MCNC: 3.1 PG/ML (ref 2–4.4)
T4 FREE SERPL-MCNC: 1.09 NG/DL (ref 0.82–1.77)
TRIGL SERPL-MCNC: 120 MG/DL (ref 0–149)
TSH SERPL DL<=0.005 MIU/L-ACNC: 1.21 UIU/ML (ref 0.45–4.5)
VLDLC SERPL CALC-MCNC: 21 MG/DL (ref 5–40)
WBC # BLD AUTO: 6.5 X10E3/UL (ref 3.4–10.8)

## 2022-03-15 DIAGNOSIS — E03.4 HYPOTHYROIDISM DUE TO ACQUIRED ATROPHY OF THYROID: ICD-10-CM

## 2022-03-15 RX ORDER — LEVOTHYROXINE SODIUM 0.07 MG/1
TABLET ORAL
Qty: 30 TABLET | Refills: 0 | Status: SHIPPED | OUTPATIENT
Start: 2022-03-15 | End: 2022-04-13

## 2022-03-24 RX ORDER — DULOXETIN HYDROCHLORIDE 60 MG/1
CAPSULE, DELAYED RELEASE ORAL
Qty: 30 CAPSULE | Refills: 0 | Status: SHIPPED | OUTPATIENT
Start: 2022-03-24 | End: 2022-04-22

## 2022-03-24 NOTE — TELEPHONE ENCOUNTER
Received request via: Pharmacy    Was the patient seen in the last year in this department? Yes 3/3/22    Does the patient have an active prescription (recently filled or refills available) for medication(s) requested? No

## 2022-04-04 DIAGNOSIS — B37.2 CANDIDAL INTERTRIGO: ICD-10-CM

## 2022-04-05 RX ORDER — NYSTATIN 100000 [USP'U]/G
POWDER TOPICAL
Qty: 15 G | Refills: 0 | Status: SHIPPED | OUTPATIENT
Start: 2022-04-05 | End: 2022-05-16

## 2022-04-12 DIAGNOSIS — E03.4 HYPOTHYROIDISM DUE TO ACQUIRED ATROPHY OF THYROID: ICD-10-CM

## 2022-04-13 RX ORDER — LEVOTHYROXINE SODIUM 0.07 MG/1
TABLET ORAL
Qty: 90 TABLET | Refills: 3 | Status: SHIPPED | OUTPATIENT
Start: 2022-04-13 | End: 2023-02-15 | Stop reason: SDUPTHER

## 2022-04-21 ENCOUNTER — APPOINTMENT (OUTPATIENT)
Dept: RADIOLOGY | Facility: MEDICAL CENTER | Age: 67
End: 2022-04-21
Attending: FAMILY MEDICINE
Payer: MEDICARE

## 2022-04-22 DIAGNOSIS — F31.9 BIPOLAR 1 DISORDER (HCC): ICD-10-CM

## 2022-04-22 RX ORDER — DULOXETIN HYDROCHLORIDE 60 MG/1
CAPSULE, DELAYED RELEASE ORAL
Qty: 30 CAPSULE | Refills: 1 | Status: SHIPPED | OUTPATIENT
Start: 2022-04-22 | End: 2022-06-22

## 2022-04-22 RX ORDER — ARIPIPRAZOLE 10 MG/1
TABLET ORAL
Qty: 90 TABLET | Refills: 0 | Status: SHIPPED | OUTPATIENT
Start: 2022-04-22 | End: 2022-07-27

## 2022-05-16 DIAGNOSIS — B37.2 CANDIDAL INTERTRIGO: ICD-10-CM

## 2022-05-17 RX ORDER — NYSTATIN 100000 [USP'U]/G
POWDER TOPICAL
Qty: 15 G | Refills: 0 | Status: SHIPPED | OUTPATIENT
Start: 2022-05-17 | End: 2022-06-22

## 2022-05-20 ENCOUNTER — HOSPITAL ENCOUNTER (OUTPATIENT)
Dept: RADIOLOGY | Facility: MEDICAL CENTER | Age: 67
End: 2022-05-20
Attending: FAMILY MEDICINE
Payer: MEDICARE

## 2022-05-20 DIAGNOSIS — Z12.31 ENCOUNTER FOR SCREENING MAMMOGRAM FOR BREAST CANCER: ICD-10-CM

## 2022-05-20 PROCEDURE — 77063 BREAST TOMOSYNTHESIS BI: CPT

## 2022-06-21 DIAGNOSIS — B37.2 CANDIDAL INTERTRIGO: ICD-10-CM

## 2022-06-22 RX ORDER — NYSTATIN 100000 [USP'U]/G
POWDER TOPICAL
Qty: 15 G | Refills: 0 | Status: SHIPPED | OUTPATIENT
Start: 2022-06-22 | End: 2022-08-26

## 2022-06-22 RX ORDER — DULOXETIN HYDROCHLORIDE 60 MG/1
CAPSULE, DELAYED RELEASE ORAL
Qty: 30 CAPSULE | Refills: 0 | Status: SHIPPED | OUTPATIENT
Start: 2022-06-22 | End: 2022-07-27

## 2022-07-01 ENCOUNTER — OFFICE VISIT (OUTPATIENT)
Dept: MEDICAL GROUP | Age: 67
End: 2022-07-01
Payer: MEDICARE

## 2022-07-01 VITALS
WEIGHT: 225.4 LBS | TEMPERATURE: 97.4 F | RESPIRATION RATE: 16 BRPM | OXYGEN SATURATION: 97 % | HEIGHT: 67 IN | BODY MASS INDEX: 35.38 KG/M2 | SYSTOLIC BLOOD PRESSURE: 118 MMHG | HEART RATE: 74 BPM | DIASTOLIC BLOOD PRESSURE: 72 MMHG

## 2022-07-01 DIAGNOSIS — F32.0 CURRENT MILD EPISODE OF MAJOR DEPRESSIVE DISORDER, UNSPECIFIED WHETHER RECURRENT (HCC): ICD-10-CM

## 2022-07-01 DIAGNOSIS — J41.8 MIXED SIMPLE AND MUCOPURULENT CHRONIC BRONCHITIS (HCC): ICD-10-CM

## 2022-07-01 DIAGNOSIS — E66.9 OBESITY (BMI 30-39.9): ICD-10-CM

## 2022-07-01 DIAGNOSIS — E03.4 HYPOTHYROIDISM DUE TO ACQUIRED ATROPHY OF THYROID: ICD-10-CM

## 2022-07-01 PROCEDURE — 99214 OFFICE O/P EST MOD 30 MIN: CPT | Performed by: FAMILY MEDICINE

## 2022-07-01 RX ORDER — ALBUTEROL SULFATE 90 UG/1
2 AEROSOL, METERED RESPIRATORY (INHALATION) EVERY 6 HOURS PRN
Qty: 8.5 G | Refills: 3 | Status: SHIPPED | OUTPATIENT
Start: 2022-07-01

## 2022-07-01 ASSESSMENT — FIBROSIS 4 INDEX: FIB4 SCORE: 1.31

## 2022-07-01 NOTE — PROGRESS NOTES
This medical record contains text that has been entered with the assistance of computer voice recognition and dictation software.  Therefore, it may contain unintended errors in text, spelling, punctuation, or grammar      Chief Complaint   Patient presents with   • Follow-Up   • Lab Results     5/20/22 Mammo, 3/9/22 Blood Work   • Medication Refill         Alex Shepard is a 67 y.o. female here evaluation and management of: Routine follow-up lab results      HPI:     1. Hypothyroidism due to acquired atrophy of thyroid  Levothyroxine 75 mcg p.o. daily    The patient denies any new fatigue, cold/heat intolerance, weight gain/weight loss, diarrhea/constipation, dry skin, myalgia, depressed mood, palpitations, tremmor, hair loss, and no goiter.        2. Current mild episode of major depressive disorder, unspecified whether recurrent (HCC)  Duloxetine 60 mg p.o. daily    DEPRESSION SCREEN  Denied all of the following,  Depressed mood  Loss of interest or pleasure in nearly all activities  Changes in appetite or weight  Insomnia or hypersomnia,  Psychomotor agitation or retardation,  Fatigue or loss of energy,  Feelings of worthlessness or guilt,  Difficulty thinking, concentrating, or making decisions,  Recurrent thoughts of death or suicidal ideation, plans, or attempts   No Early Morning Awakenings      3. Obesity (BMI 30-39.9)  The patient states that she has started the [employer] diet they will be sending her meals she has lost some weight.  She is very excited about this.  She refuses to take Ozempic.    Current medicines (including changes today)  Current Outpatient Medications   Medication Sig Dispense Refill   • albuterol (PROAIR HFA) 108 (90 Base) MCG/ACT Aero Soln inhalation aerosol Inhale 2 Puffs every 6 hours as needed for Shortness of Breath. 8.5 g 3   • DULoxetine (CYMBALTA) 60 MG Cap DR Particles delayed-release capsule Take 1 capsule by mouth once daily 30 Capsule 0   • nystatin (NYSTOP) powder  APPLY 1 GRAM TOPICALLY TO AFFECTED AREA 4 TIMES DAILY 15 g 0   • ARIPiprazole (ABILIFY) 10 MG Tab TAKE 1 TABLET BY MOUTH ONCE DAILY (NEED  TO  SCHEDULE  AN  OFFICE  VISIT  FOR  FUTURE  REFILLS) 90 Tablet 0   • levothyroxine (EUTHYROX) 75 MCG Tab TAKE 1 TABLET BY MOUTH ONCE DAILY IN THE MORNING ON AN EMPTY STOMACH (NEED  OFFICE  VISIT  AND  LABS) 90 Tablet 3   • triamcinolone acetonide (KENALOG) 0.1 % Ointment Aaa bid x 14 days then stop 1 Each 1   • propranolol LA (INDERAL LA) 60 MG CAPSULE SR 24 HR Take 1 Capsule by mouth every day. 30 Capsule 11   • fluticasone (FLONASE) 50 MCG/ACT nasal spray Administer 1 Spray into affected nostril(S) every day. 16 g 4   • albuterol (PROVENTIL) 2.5mg/0.5ml Nebu Soln 0.5 mL by Nebulization route every four hours as needed for Shortness of Breath. 2 Bottle 3   • calcium-vitamin D (OSCAL 500 +D) 500-200 MG-UNIT Tab Take 1 Tab by mouth 2 times a day, with meals. (Patient not taking: Reported on 7/1/2022) 360 Tab 0     No current facility-administered medications for this visit.     She  has a past medical history of Anxiety, ASTHMA, Back pain, Bipolar affect, depressed (HCC), Bronchitis, Cholelithiasis, Chronic airway obstruction, not elsewhere classified, Cigarette smoker one half pack a day or less, Depression, H/O cervical spine surgery (2002 / 2005), Hyponatremia, Pneumonia, Psychiatric disorder (2005), Psychosis (HCC), PTSD (post-traumatic stress disorder), Pulmonary nodule, S/P appendectomy (1968), and S/P repair of patent ductus arteriosus (1958).  She  has a past surgical history that includes other cardiac surgery; other (2002, 2005); other; appendectomy; and cristofer by laparoscopy (Bilateral, 5/21/2016).  Social History     Tobacco Use   • Smoking status: Former Smoker     Packs/day: 0.50     Years: 15.00     Pack years: 7.50     Types: Cigarettes     Start date: 1/1/1999   • Smokeless tobacco: Never Used   • Tobacco comment: quit 2 months ago    Vaping Use   • Vaping Use:  "Never used   Substance Use Topics   • Alcohol use: Yes     Alcohol/week: 0.0 oz     Comment: occas glass of wine, Agrees to stop use   • Drug use: No     Social History     Social History Narrative    , 2 children.      Family History   Problem Relation Age of Onset   • Psychiatric Illness Mother         depression   • Other Mother         TIA   • Diabetes Mother    • Psychiatric Illness Sister         depression   • Cancer Father         prostate;skin     Family Status   Relation Name Status   • Mo   at age 91   • Sis     • Fa   at age 86         ROS    The pertinent  ROS findings can be seen in the HPI above.     All other systems reviewed and are negative     Objective:     /72 (BP Location: Right arm, Patient Position: Sitting, BP Cuff Size: Adult)   Pulse 74   Temp 36.3 °C (97.4 °F) (Temporal)   Resp 16   Ht 1.702 m (5' 7\")   Wt 102 kg (225 lb 6.4 oz)   SpO2 97%  Body mass index is 35.3 kg/m².      Physical Exam:    Constitutional: Alert, no distress.  Skin: No suspicious lesions  Eye: Equal, round and reactive, conjunctiva clear, lids normal.  ENMT: Lips without lesions, good dentition, oropharynx clear.  Neck: Trachea midline, no masses, no thyromegaly. No cervical or supraclavicular lymphadenopathy.  Respiratory: Unlabored respiratory effort, lungs clear to auscultation, no wheezes, no ronchi.  Cardiovascular: Normal S1, S2, no murmur, no edema  Abdomen: Soft, non-tender, no masses, no hepatosplenomegaly.        Assessment and Plan:   The following treatment plan was discussed      1. Hypothyroidism due to acquired atrophy of thyroid  Patient has been stable with current management  We will make no changes for now      2. Current mild episode of major depressive disorder, unspecified whether recurrent (HCC)  Patient has been stable with current management  We will make no changes for now      3. Obesity (BMI 30-39.9)  I offered her none explained how Ozempic might " be beneficial  But she does not want to use any needles.    4. Mixed simple and mucopurulent chronic bronchitis (HCC)    Refill placed    - albuterol (PROAIR HFA) 108 (90 Base) MCG/ACT Aero Soln inhalation aerosol; Inhale 2 Puffs every 6 hours as needed for Shortness of Breath.  Dispense: 8.5 g; Refill: 3      Instructed to Follow up in clinic or ER for worsening symptoms, difficulty breathing, lack of expected recovery, or should new symptoms or problems arise.    Followup: Return in about 6 months (around 1/1/2023) for Reevaluation, labs.

## 2022-07-20 ENCOUNTER — TELEPHONE (OUTPATIENT)
Dept: MEDICAL GROUP | Age: 67
End: 2022-07-20
Payer: MEDICARE

## 2022-07-20 NOTE — TELEPHONE ENCOUNTER
VOICEMAIL  1. Caller Name: Kyann Hickerson                        Call Back Number: [phone] (home)       2. Message: Patient and  tested positive for COVID, would like to know what the next steps are. Please advise.    3. Patient approves office to leave a detailed voicemail/MyChart message: yes

## 2022-07-21 NOTE — TELEPHONE ENCOUNTER
Quarantine at home, if they are breathing without struggle and able to drink water they can stay at home.  They are considered infectious until they have 2 days of no fevers no symptoms without taking Tylenol or NSAIDs.

## 2022-07-26 DIAGNOSIS — F31.9 BIPOLAR 1 DISORDER (HCC): ICD-10-CM

## 2022-07-27 RX ORDER — DULOXETIN HYDROCHLORIDE 60 MG/1
CAPSULE, DELAYED RELEASE ORAL
Qty: 30 CAPSULE | Refills: 0 | Status: SHIPPED | OUTPATIENT
Start: 2022-07-27 | End: 2022-08-26

## 2022-07-27 RX ORDER — ARIPIPRAZOLE 10 MG/1
TABLET ORAL
Qty: 90 TABLET | Refills: 0 | Status: SHIPPED | OUTPATIENT
Start: 2022-07-27 | End: 2023-02-15 | Stop reason: SDUPTHER

## 2022-08-11 ENCOUNTER — OFFICE VISIT (OUTPATIENT)
Dept: MEDICAL GROUP | Age: 67
End: 2022-08-11
Payer: MEDICARE

## 2022-08-11 VITALS
TEMPERATURE: 97.1 F | WEIGHT: 215.9 LBS | BODY MASS INDEX: 33.89 KG/M2 | RESPIRATION RATE: 16 BRPM | HEIGHT: 67 IN | OXYGEN SATURATION: 97 % | HEART RATE: 81 BPM

## 2022-08-11 DIAGNOSIS — Z20.822 EXPOSURE TO COVID-19 VIRUS: ICD-10-CM

## 2022-08-11 PROCEDURE — 99214 OFFICE O/P EST MOD 30 MIN: CPT | Performed by: FAMILY MEDICINE

## 2022-08-11 RX ORDER — AZITHROMYCIN 250 MG/1
TABLET, FILM COATED ORAL
Qty: 6 TABLET | Refills: 0 | Status: SHIPPED | OUTPATIENT
Start: 2022-08-11 | End: 2022-09-20

## 2022-08-11 RX ORDER — PREDNISONE 20 MG/1
TABLET ORAL
Qty: 12 TABLET | Refills: 0 | Status: SHIPPED | OUTPATIENT
Start: 2022-08-11 | End: 2022-09-20

## 2022-08-11 RX ORDER — BENZONATATE 100 MG/1
100 CAPSULE ORAL 3 TIMES DAILY PRN
Qty: 60 CAPSULE | Refills: 0 | Status: SHIPPED | OUTPATIENT
Start: 2022-08-11

## 2022-08-11 RX ORDER — CODEINE PHOSPHATE AND GUAIFENESIN 10; 100 MG/5ML; MG/5ML
5 SOLUTION ORAL 3 TIMES DAILY PRN
Qty: 105 ML | Refills: 0 | Status: SHIPPED | OUTPATIENT
Start: 2022-08-11 | End: 2022-08-18

## 2022-08-11 ASSESSMENT — FIBROSIS 4 INDEX: FIB4 SCORE: 1.31

## 2022-08-11 NOTE — PROGRESS NOTES
This medical record contains text that has been entered with the assistance of computer voice recognition and dictation software.  Therefore, it may contain unintended errors in text, spelling, punctuation, or grammar      Chief Complaint   Patient presents with    Malaise     Lingering symptoms from COVID-19, SOB coughing, weakness,         Alex Shepard is a 67 y.o. female here evaluation and management of: Coughing, shortness of breath      HPI:     1. Exposure to COVID-19 virus  NEW UNDIAGNOSED PROBLEM    Alex is a very pleasant 67-year-old female who stopped smoking cigarettes in 1999.  Prior to that she had a 15-pack-year smoking history.  She states that 4 weeks ago she began to have a cough productive of greenish sputum, mild shortness of breath and fatigue.  She states that her  tested positive for COVID she tested negative he is doing fine but she still feels very tired and has no energy.  She is able to eat tolerate p.o. fluids and eat food.  She denies any diarrhea no chest pain or shortness of breath    Current medicines (including changes today)  Current Outpatient Medications   Medication Sig Dispense Refill    predniSONE (DELTASONE) 20 MG Tab Take 3 tabs po for 2 days then 2 tabs for 2 days then 1 for 2 days 12 Tablet 0    azithromycin (ZITHROMAX) 250 MG Tab 2 tabs by mouth day 1, 1 tab by mouth days 2-5 6 Tablet 0    Ascorbic Acid 500 MG Cap TAKE 2 TABS PO TID 30 Capsule 0    guaifenesin-codeine (ROBITUSSIN AC) Solution oral solution Take 5 mL by mouth 3 times a day as needed for Cough for up to 7 days. 105 mL 0    ARIPiprazole (ABILIFY) 10 MG Tab TAKE 1 TABLET BY MOUTH ONCE DAILY . APPOINTMENT REQUIRED FOR FUTURE REFILLS 90 Tablet 0    DULoxetine (CYMBALTA) 60 MG Cap DR Particles delayed-release capsule Take 1 capsule by mouth once daily 30 Capsule 0    albuterol (PROAIR HFA) 108 (90 Base) MCG/ACT Aero Soln inhalation aerosol Inhale 2 Puffs every 6 hours as needed for Shortness of Breath.  8.5 g 3    nystatin (NYSTOP) powder APPLY 1 GRAM TOPICALLY TO AFFECTED AREA 4 TIMES DAILY 15 g 0    levothyroxine (EUTHYROX) 75 MCG Tab TAKE 1 TABLET BY MOUTH ONCE DAILY IN THE MORNING ON AN EMPTY STOMACH (NEED  OFFICE  VISIT  AND  LABS) 90 Tablet 3    triamcinolone acetonide (KENALOG) 0.1 % Ointment Aaa bid x 14 days then stop 1 Each 1    propranolol LA (INDERAL LA) 60 MG CAPSULE SR 24 HR Take 1 Capsule by mouth every day. 30 Capsule 11    fluticasone (FLONASE) 50 MCG/ACT nasal spray Administer 1 Spray into affected nostril(S) every day. 16 g 4    albuterol (PROVENTIL) 2.5mg/0.5ml Nebu Soln 0.5 mL by Nebulization route every four hours as needed for Shortness of Breath. 2 Bottle 3     No current facility-administered medications for this visit.     She  has a past medical history of Anxiety, ASTHMA, Back pain, Bipolar affect, depressed ([facility]), Bronchitis, Cholelithiasis, Chronic airway obstruction, not elsewhere classified, Cigarette smoker one half pack a day or less, Depression, H/O cervical spine surgery (2002 / 2005), Hyponatremia, Pneumonia, Psychiatric disorder (2005), Psychosis (HCC), PTSD (post-traumatic stress disorder), Pulmonary nodule, S/P appendectomy (1968), and S/P repair of patent ductus arteriosus (1958).  She  has a past surgical history that includes other cardiac surgery; other (2002, 2005); other; appendectomy; and cristofer by laparoscopy (Bilateral, 5/21/2016).  Social History     Tobacco Use    Smoking status: Former     Packs/day: 0.50     Years: 15.00     Pack years: 7.50     Types: Cigarettes     Start date: 1/1/1999    Smokeless tobacco: Never    Tobacco comments:     quit 2 months ago    Vaping Use    Vaping Use: Never used   Substance Use Topics    Alcohol use: Yes     Alcohol/week: 0.0 oz     Comment: occas glass of wine, Agrees to stop use    Drug use: No     Social History     Social History Narrative    , 2 children.      Family History   Problem Relation Age of Onset     "Psychiatric Illness Mother         depression    Other Mother         TIA    Diabetes Mother     Psychiatric Illness Sister         depression    Cancer Father         prostate;skin     Family Status   Relation Name Status    Mo   at age 91    Sis      Fa   at age 86         ROS    The pertinent  ROS findings can be seen in the HPI above.     All other systems reviewed and are negative     Objective:     Pulse 81   Temp 36.2 °C (97.1 °F) (Temporal)   Resp 16   Ht 1.702 m (5' 7\")   Wt 97.9 kg (215 lb 14.4 oz)   SpO2 97%  Body mass index is 33.81 kg/m².      Physical Exam:    Constitutional: Alert, no distress.  Skin: No suspicious lesions  Eye: Equal, round and reactive, conjunctiva clear, lids normal.  ENMT: Lips without lesions, good dentition, oropharynx clear.  Neck: Trachea midline, no masses, no thyromegaly. No cervical or supraclavicular lymphadenopathy.  Respiratory: Unlabored respiratory effort, lungs clear to auscultation, no wheezes, no ronchi.  Cardiovascular: Normal S1, S2, no murmur, no edema  Abdomen: Soft, non-tender, no masses, no hepatosplenomegaly.        Assessment and Plan:   The following treatment plan was discussed      1. Exposure to COVID-19 virus  Her O2 saturation is 97% on room air  She is afebrile hemodynamically stable  She may be treated safely as an outpatient  We will treat for possible arterial coinfection    - predniSONE (DELTASONE) 20 MG Tab; Take 3 tabs po for 2 days then 2 tabs for 2 days then 1 for 2 days  Dispense: 12 Tablet; Refill: 0  - azithromycin (ZITHROMAX) 250 MG Tab; 2 tabs by mouth day 1, 1 tab by mouth days 2-5  Dispense: 6 Tablet; Refill: 0  - Ascorbic Acid 500 MG Cap; TAKE 2 TABS PO TID  Dispense: 30 Capsule; Refill: 0  - DX-CHEST-2 VIEWS; Future            Instructed to Follow up in clinic or ER for worsening symptoms, difficulty breathing, lack of expected recovery, or should new symptoms or problems arise.    Followup: Return in " about 3 months (around 11/11/2022) for Reevaluation.

## 2022-08-14 ENCOUNTER — APPOINTMENT (OUTPATIENT)
Dept: RADIOLOGY | Facility: IMAGING CENTER | Age: 67
End: 2022-08-14
Attending: FAMILY MEDICINE
Payer: MEDICARE

## 2022-08-14 ENCOUNTER — NON-PROVIDER VISIT (OUTPATIENT)
Dept: URGENT CARE | Facility: CLINIC | Age: 67
End: 2022-08-14
Payer: MEDICARE

## 2022-08-14 DIAGNOSIS — Z20.822 EXPOSURE TO COVID-19 VIRUS: ICD-10-CM

## 2022-08-14 PROCEDURE — 71046 X-RAY EXAM CHEST 2 VIEWS: CPT | Mod: TC,CS | Performed by: PHYSICIAN ASSISTANT

## 2022-08-19 ENCOUNTER — OFFICE VISIT (OUTPATIENT)
Dept: MEDICAL GROUP | Age: 67
End: 2022-08-19
Payer: MEDICARE

## 2022-08-19 VITALS
WEIGHT: 212 LBS | SYSTOLIC BLOOD PRESSURE: 114 MMHG | HEART RATE: 86 BPM | BODY MASS INDEX: 33.27 KG/M2 | TEMPERATURE: 98.3 F | HEIGHT: 67 IN | DIASTOLIC BLOOD PRESSURE: 62 MMHG | RESPIRATION RATE: 16 BRPM

## 2022-08-19 DIAGNOSIS — J18.9 PNEUMONIA DUE TO INFECTIOUS ORGANISM, UNSPECIFIED LATERALITY, UNSPECIFIED PART OF LUNG: ICD-10-CM

## 2022-08-19 PROCEDURE — 99214 OFFICE O/P EST MOD 30 MIN: CPT | Performed by: FAMILY MEDICINE

## 2022-08-19 RX ORDER — PREDNISONE 20 MG/1
TABLET ORAL
Qty: 12 TABLET | Refills: 0 | Status: SHIPPED | OUTPATIENT
Start: 2022-08-19 | End: 2022-09-20

## 2022-08-19 RX ORDER — AZITHROMYCIN 250 MG/1
TABLET, FILM COATED ORAL
Qty: 6 TABLET | Refills: 0 | Status: SHIPPED | OUTPATIENT
Start: 2022-08-19 | End: 2022-09-20

## 2022-08-19 ASSESSMENT — FIBROSIS 4 INDEX: FIB4 SCORE: 1.31

## 2022-08-19 NOTE — PROGRESS NOTES
This medical record contains text that has been entered with the assistance of computer voice recognition and dictation software.  Therefore, it may contain unintended errors in text, spelling, punctuation, or grammar      Chief Complaint   Patient presents with    Follow-Up     Pneumonia. A little more weak         Alex Shpeard is a 67 y.o. female here evaluation and management of: Routine follow-up      HPI:     HCC Gap Form    Diagnosis to address: D58.2 - Elevated hemoglobin (HCC)  Assessment and plan: Chronic, stable. Continue with current defined treatment plan: . Follow-up at least annually.  Last edited 08/19/22 14:22 PDT by Remigio Raza M.D.           1. Pneumonia due to infectious organism, unspecified laterality, unspecified part of lung  Can originally presented to me on August 11, 2022 with a chief complaint of cough productive of greenish sputum mild shortness of breath and fatigue.  Her  had tested positive for COVID at home she tested negative but she felt the symptoms above including no energy so she came in.  She had been vaccinated for COVID-19 with Moderna x2.  She was given azithromycin, as well as a steroid taper and vitamin C.  We also obtained a plain film which revealed possible multilobar pneumonia.  Overall she thinks she feels the same if not better, but not completely gone.  She thinks the cough is better.    Chest x-ray on August 14, 2022        IMPRESSION:     1.  Extensive consolidation on the left may represent multifocal pneumonia. Follow-up to radiographic resolution is recommended.  2.  Left-sided volume loss.  3.  There may be a trace left pleural effusion.  4.  Atherosclerotic plaque.           Exam Ended: 08/14/22 11:19 AM Last Resulted: 08/14/22 11:23 AM                urrent medicines (including changes today)  Current Outpatient Medications   Medication Sig Dispense Refill    azithromycin (ZITHROMAX) 250 MG Tab 2 tabs by mouth day 1, 1 tab by mouth days 2-5 6  Tablet 0    predniSONE (DELTASONE) 20 MG Tab Take 3 tabs po for 2 days then 2 tabs for 2 days then 1 for 2 days 12 Tablet 0    predniSONE (DELTASONE) 20 MG Tab Take 3 tabs po for 2 days then 2 tabs for 2 days then 1 for 2 days 12 Tablet 0    azithromycin (ZITHROMAX) 250 MG Tab 2 tabs by mouth day 1, 1 tab by mouth days 2-5 6 Tablet 0    Ascorbic Acid 500 MG Cap TAKE 2 TABS PO TID 30 Capsule 0    benzonatate (TESSALON) 100 MG Cap Take 1 Capsule by mouth 3 times a day as needed for Cough. 60 Capsule 0    ARIPiprazole (ABILIFY) 10 MG Tab TAKE 1 TABLET BY MOUTH ONCE DAILY . APPOINTMENT REQUIRED FOR FUTURE REFILLS 90 Tablet 0    DULoxetine (CYMBALTA) 60 MG Cap DR Particles delayed-release capsule Take 1 capsule by mouth once daily 30 Capsule 0    albuterol (PROAIR HFA) 108 (90 Base) MCG/ACT Aero Soln inhalation aerosol Inhale 2 Puffs every 6 hours as needed for Shortness of Breath. 8.5 g 3    nystatin (NYSTOP) powder APPLY 1 GRAM TOPICALLY TO AFFECTED AREA 4 TIMES DAILY 15 g 0    levothyroxine (EUTHYROX) 75 MCG Tab TAKE 1 TABLET BY MOUTH ONCE DAILY IN THE MORNING ON AN EMPTY STOMACH (NEED  OFFICE  VISIT  AND  LABS) 90 Tablet 3    triamcinolone acetonide (KENALOG) 0.1 % Ointment Aaa bid x 14 days then stop 1 Each 1    propranolol LA (INDERAL LA) 60 MG CAPSULE SR 24 HR Take 1 Capsule by mouth every day. 30 Capsule 11    fluticasone (FLONASE) 50 MCG/ACT nasal spray Administer 1 Spray into affected nostril(S) every day. 16 g 4    albuterol (PROVENTIL) 2.5mg/0.5ml Nebu Soln 0.5 mL by Nebulization route every four hours as needed for Shortness of Breath. 2 Bottle 3     No current facility-administered medications for this visit.     She  has a past medical history of Anxiety, ASTHMA, Back pain, Bipolar affect, depressed ([facility]), Bronchitis, Cholelithiasis, Chronic airway obstruction, not elsewhere classified, Cigarette smoker one half pack a day or less, Depression, H/O cervical spine surgery (2002 / 2005), Hyponatremia,  "Pneumonia, Psychiatric disorder (), Psychosis (HCC), PTSD (post-traumatic stress disorder), Pulmonary nodule, S/P appendectomy (), and S/P repair of patent ductus arteriosus ().  She  has a past surgical history that includes other cardiac surgery; other (, ); other; appendectomy; and cristofer by laparoscopy (Bilateral, 2016).  Social History     Tobacco Use    Smoking status: Former     Packs/day: 0.50     Years: 15.00     Pack years: 7.50     Types: Cigarettes     Start date: 1999    Smokeless tobacco: Never    Tobacco comments:     quit 2 months ago    Vaping Use    Vaping Use: Never used   Substance Use Topics    Alcohol use: Yes     Alcohol/week: 0.0 oz     Comment: occas glass of wine, Agrees to stop use    Drug use: No     Social History     Social History Narrative    , 2 children.      Family History   Problem Relation Age of Onset    Psychiatric Illness Mother         depression    Other Mother         TIA    Diabetes Mother     Psychiatric Illness Sister         depression    Cancer Father         prostate;skin     Family Status   Relation Name Status    Mo   at age 91    Sis      Fa   at age 86         ROS    The pertinent  ROS findings can be seen in the HPI above.     All other systems reviewed and are negative     Objective:     /62 (BP Location: Right arm, Patient Position: Sitting, BP Cuff Size: Adult)   Pulse 86   Temp 36.8 °C (98.3 °F) (Temporal)   Resp 16   Ht 1.702 m (5' 7\")   Wt 96.2 kg (212 lb)  Body mass index is 33.2 kg/m².      Physical Exam:    Constitutional: Alert, no distress.  Skin: No suspicious lesions  Eye: Equal, round and reactive, conjunctiva clear, lids normal.  ENMT: Lips without lesions, good dentition, oropharynx clear.  Neck: Trachea midline, no masses, no thyromegaly. No cervical or supraclavicular lymphadenopathy.  Respiratory: Unlabored respiratory effort, lungs clear to auscultation, no wheezes, no " harryluisa.  Cardiovascular: Normal S1, S2, no murmur, no edema  Abdomen: Soft, non-tender, no masses, no hepatosplenomegaly.        Assessment and Plan:   The following treatment plan was discussed      1. Pneumonia due to infectious organism, unspecified laterality, unspecified part of lung  We will prolong the course of azithromycin  Unfortunately she is allergic to penicillin so we cannot give her in clinic Rocephin injection.  We will also repeat the steroid taper and she is to repeat a chest x-ray 2 months from now to ensure resolution of infiltrate.    She is afebrile hemodynamically stable, in no respiratory distress.  O2 saturation could not be taken because she has acrylic nails on.  She is stable enough to be treated as an outpatient.  Clear ER precautions given    - DX-CHEST-2 VIEWS; Future  - azithromycin (ZITHROMAX) 250 MG Tab; 2 tabs by mouth day 1, 1 tab by mouth days 2-5  Dispense: 6 Tablet; Refill: 0  - predniSONE (DELTASONE) 20 MG Tab; Take 3 tabs po for 2 days then 2 tabs for 2 days then 1 for 2 days  Dispense: 12 Tablet; Refill: 0            Instructed to Follow up in clinic or ER for worsening symptoms, difficulty breathing, lack of expected recovery, or should new symptoms or problems arise.    Followup: Return in about 6 months (around 2/19/2023) for Reevaluation, labs.

## 2022-08-26 DIAGNOSIS — B37.2 CANDIDAL INTERTRIGO: ICD-10-CM

## 2022-08-26 RX ORDER — NYSTATIN 100000 [USP'U]/G
POWDER TOPICAL
Qty: 15 G | Refills: 0 | Status: SHIPPED | OUTPATIENT
Start: 2022-08-26 | End: 2022-10-05

## 2022-08-26 RX ORDER — DULOXETIN HYDROCHLORIDE 60 MG/1
CAPSULE, DELAYED RELEASE ORAL
Qty: 30 CAPSULE | Refills: 0 | Status: SHIPPED | OUTPATIENT
Start: 2022-08-26 | End: 2022-10-05

## 2022-09-13 ENCOUNTER — TELEPHONE (OUTPATIENT)
Dept: MEDICAL GROUP | Age: 67
End: 2022-09-13
Payer: MEDICARE

## 2022-09-13 NOTE — TELEPHONE ENCOUNTER
VOICEMAIL  1. Caller Name: Kyann Hickerson                        Call Back Number: [phone] (home)       2. Message: Patient dx with pneumoniua, has chest X-Ray that needs to be repeated in October. Still having severe issues. Please advise if X-Ray needs to be changed to STAT or appointment is needed.    3. Patient approves office to leave a detailed voicemail/MyChart message: yes

## 2022-09-14 NOTE — TELEPHONE ENCOUNTER
Patient states she is actually having back issues. Scheduled appointment for Friday for those issues. Let patient know if it is her chest or something more serious, she needs to go to the ER.

## 2022-09-16 ENCOUNTER — OFFICE VISIT (OUTPATIENT)
Dept: MEDICAL GROUP | Age: 67
End: 2022-09-16
Payer: MEDICARE

## 2022-09-16 VITALS
RESPIRATION RATE: 16 BRPM | BODY MASS INDEX: 32.93 KG/M2 | OXYGEN SATURATION: 89 % | HEART RATE: 102 BPM | WEIGHT: 209.8 LBS | SYSTOLIC BLOOD PRESSURE: 120 MMHG | HEIGHT: 67 IN | DIASTOLIC BLOOD PRESSURE: 74 MMHG | TEMPERATURE: 96.2 F

## 2022-09-16 DIAGNOSIS — R07.81 RIB PAIN: ICD-10-CM

## 2022-09-16 PROCEDURE — 99214 OFFICE O/P EST MOD 30 MIN: CPT | Performed by: FAMILY MEDICINE

## 2022-09-16 RX ORDER — HYDROCODONE BITARTRATE AND ACETAMINOPHEN 7.5; 325 MG/1; MG/1
1 TABLET ORAL EVERY 6 HOURS PRN
Qty: 20 TABLET | Refills: 0 | Status: SHIPPED | OUTPATIENT
Start: 2022-09-16 | End: 2022-10-16

## 2022-09-16 ASSESSMENT — FIBROSIS 4 INDEX: FIB4 SCORE: 1.31

## 2022-09-20 NOTE — PROGRESS NOTES
This medical record contains text that has been entered with the assistance of computer voice recognition and dictation software.  Therefore, it may contain unintended errors in text, spelling, punctuation, or grammar      Chief Complaint   Patient presents with    Back Pain    Follow-Up     COVID, lingering symptoms    Dizziness     Woke up nauseous, dizzy still lingering         Alex Shepard is a 67 y.o. female here evaluation and management of: Back pain      HPI:     HCC Gap Form    Last edited 09/20/22 06:53 PDT by Remigio Raza M.D.           1. Rib pain  NEW UNDIAGNOSED PROBLEM    The patient is a very pleasant 67-year-old female who presents to clinic with chief complaint of having some thoracic rib pain.  She states that she just woke up with it maybe 5 days ago she does not recall any injuries.  She is able to take a deep breath however it is painful She was recently diagnosed with pneumonia, and has been coughing. She has been coughing which has been causing the rib/chest pain. She has not been producing sputum, no fevers, no chills, overall she feels better regarding the pneumonia. Her only issue now is rib/pain while coughing.       Current medicines (including changes today)  Current Outpatient Medications   Medication Sig Dispense Refill    HYDROcodone-acetaminophen (NORCO) 7.5-325 MG tab Take 1 Tablet by mouth every 6 hours as needed for Moderate Pain for up to 30 days. 20 Tablet 0    DULoxetine (CYMBALTA) 60 MG Cap DR Particles delayed-release capsule Take 1 capsule by mouth once daily 30 Capsule 0    nystatin (NYSTOP) powder APPLY 1 GRAM TOPICALLY TO AFFECTED AREA 4 TIMES DAILY 15 g 0    benzonatate (TESSALON) 100 MG Cap Take 1 Capsule by mouth 3 times a day as needed for Cough. 60 Capsule 0    ARIPiprazole (ABILIFY) 10 MG Tab TAKE 1 TABLET BY MOUTH ONCE DAILY . APPOINTMENT REQUIRED FOR FUTURE REFILLS 90 Tablet 0    albuterol (PROAIR HFA) 108 (90 Base) MCG/ACT Aero Soln inhalation aerosol  Inhale 2 Puffs every 6 hours as needed for Shortness of Breath. 8.5 g 3    levothyroxine (EUTHYROX) 75 MCG Tab TAKE 1 TABLET BY MOUTH ONCE DAILY IN THE MORNING ON AN EMPTY STOMACH (NEED  OFFICE  VISIT  AND  LABS) 90 Tablet 3    triamcinolone acetonide (KENALOG) 0.1 % Ointment Aaa bid x 14 days then stop 1 Each 1    propranolol LA (INDERAL LA) 60 MG CAPSULE SR 24 HR Take 1 Capsule by mouth every day. 30 Capsule 11    fluticasone (FLONASE) 50 MCG/ACT nasal spray Administer 1 Spray into affected nostril(S) every day. 16 g 4    albuterol (PROVENTIL) 2.5mg/0.5ml Nebu Soln 0.5 mL by Nebulization route every four hours as needed for Shortness of Breath. 2 Bottle 3    azithromycin (ZITHROMAX) 250 MG Tab 2 tabs by mouth day 1, 1 tab by mouth days 2-5 (Patient not taking: Reported on 9/16/2022) 6 Tablet 0    predniSONE (DELTASONE) 20 MG Tab Take 3 tabs po for 2 days then 2 tabs for 2 days then 1 for 2 days (Patient not taking: Reported on 9/16/2022) 12 Tablet 0    predniSONE (DELTASONE) 20 MG Tab Take 3 tabs po for 2 days then 2 tabs for 2 days then 1 for 2 days (Patient not taking: Reported on 9/16/2022) 12 Tablet 0    azithromycin (ZITHROMAX) 250 MG Tab 2 tabs by mouth day 1, 1 tab by mouth days 2-5 (Patient not taking: Reported on 9/16/2022) 6 Tablet 0    Ascorbic Acid 500 MG Cap TAKE 2 TABS PO TID (Patient not taking: Reported on 9/16/2022) 30 Capsule 0     No current facility-administered medications for this visit.     She  has a past medical history of Anxiety, ASTHMA, Back pain, Bipolar affect, depressed ([facility]), Bronchitis, Cholelithiasis, Chronic airway obstruction, not elsewhere classified, Cigarette smoker one half pack a day or less, Depression, H/O cervical spine surgery (2002 / 2005), Hyponatremia, Pneumonia, Psychiatric disorder (2005), Psychosis (HCC), PTSD (post-traumatic stress disorder), Pulmonary nodule, S/P appendectomy (1968), and S/P repair of patent ductus arteriosus (1958).  She  has a past surgical  "history that includes other cardiac surgery; other (, ); other; appendectomy; and cristofer by laparoscopy (Bilateral, 2016).  Social History     Tobacco Use    Smoking status: Former     Packs/day: 0.50     Years: 15.00     Pack years: 7.50     Types: Cigarettes     Start date: 1999    Smokeless tobacco: Never    Tobacco comments:     quit 2 months ago    Vaping Use    Vaping Use: Never used   Substance Use Topics    Alcohol use: Yes     Alcohol/week: 0.0 oz     Comment: occas glass of wine, Agrees to stop use    Drug use: No     Social History     Social History Narrative    , 2 children.      Family History   Problem Relation Age of Onset    Psychiatric Illness Mother         depression    Other Mother         TIA    Diabetes Mother     Psychiatric Illness Sister         depression    Cancer Father         prostate;skin     Family Status   Relation Name Status    Mo   at age 91    Sis      Fa   at age 86         ROS    The pertinent  ROS findings can be seen in the HPI above.     All other systems reviewed and are negative     Objective:     /74 (BP Location: Right arm, Patient Position: Sitting, BP Cuff Size: Adult long)   Pulse (!) 102   Temp (!) 35.7 °C (96.2 °F) (Temporal)   Resp 16   Ht 1.702 m (5' 7\")   Wt 95.2 kg (209 lb 12.8 oz)   SpO2 89%  Body mass index is 32.86 kg/m².      Physical Exam:    Constitutional: Alert, no distress.  Skin: No suspicious lesions  Eye: Equal, round and reactive, conjunctiva clear, lids normal.  ENMT: Lips without lesions, good dentition, oropharynx clear.  Neck: Trachea midline, no masses, no thyromegaly. No cervical or supraclavicular lymphadenopathy.  Respiratory: Unlabored respiratory effort, lungs clear to auscultation, no wheezes, no ronchi.  Cardiovascular: Normal S1, S2, no murmur, no edema  Abdomen: Soft, non-tender, no masses, no hepatosplenomegaly.        Assessment and Plan:   The following treatment plan was " discussed      1. Rib pain    Encouraged  patient to deep breath to prevent recurrence of pneumonia/ volume loss.   Norco for pain, ice 15 minutes 3 times daily    - HYDROcodonepatient toacetaminophen (NORCO) 7.5-325 MG tab; Take 1 Tablet by mouth every 6 hours as needed for Moderate Pain for up to 30 days.  Dispense: 20 Tablet; Refill: 0            Instructed to Follow up in clinic or ER for worsening symptoms, difficulty breathing, lack of expected recovery, or should new symptoms or problems arise.    Followup: Return in about 4 weeks (around 10/14/2022) for Reevaluation.

## 2022-10-03 DIAGNOSIS — B37.2 CANDIDAL INTERTRIGO: ICD-10-CM

## 2022-10-04 ENCOUNTER — TELEPHONE (OUTPATIENT)
Dept: MEDICAL GROUP | Age: 67
End: 2022-10-04
Payer: MEDICARE

## 2022-10-04 NOTE — TELEPHONE ENCOUNTER
VOICEMAIL  1. Caller Name: Kyann Hickerson                        Call Back Number: [phone] (home)       2. Message: Patient called stating she still has COVID issues even after prednisone. Patient was last seen 9/16/22. Please advise if other appointment or medication is needed.    3. Patient approves office to leave a detailed voicemail/MyChart message: yes

## 2022-10-04 NOTE — TELEPHONE ENCOUNTER
Called patient and advised to go to urgent care or ER for deeper imaging to check for any possible damage from COVID or if anything else can be done. Patient will go in today or sometime this week.

## 2022-10-05 ENCOUNTER — APPOINTMENT (OUTPATIENT)
Dept: RADIOLOGY | Facility: IMAGING CENTER | Age: 67
End: 2022-10-05
Attending: NURSE PRACTITIONER
Payer: MEDICARE

## 2022-10-05 ENCOUNTER — OFFICE VISIT (OUTPATIENT)
Dept: URGENT CARE | Facility: CLINIC | Age: 67
End: 2022-10-05
Payer: MEDICARE

## 2022-10-05 VITALS
WEIGHT: 207.6 LBS | TEMPERATURE: 98.5 F | SYSTOLIC BLOOD PRESSURE: 120 MMHG | BODY MASS INDEX: 32.58 KG/M2 | HEART RATE: 82 BPM | RESPIRATION RATE: 28 BRPM | OXYGEN SATURATION: 90 % | DIASTOLIC BLOOD PRESSURE: 60 MMHG | HEIGHT: 67 IN

## 2022-10-05 DIAGNOSIS — R06.02 SHORTNESS OF BREATH: ICD-10-CM

## 2022-10-05 DIAGNOSIS — R09.02 HYPOXIA: ICD-10-CM

## 2022-10-05 DIAGNOSIS — J18.9 PNEUMONIA OF RIGHT MIDDLE LOBE DUE TO INFECTIOUS ORGANISM: ICD-10-CM

## 2022-10-05 PROCEDURE — 71046 X-RAY EXAM CHEST 2 VIEWS: CPT | Mod: TC | Performed by: NURSE PRACTITIONER

## 2022-10-05 PROCEDURE — 94640 AIRWAY INHALATION TREATMENT: CPT | Performed by: NURSE PRACTITIONER

## 2022-10-05 PROCEDURE — 99214 OFFICE O/P EST MOD 30 MIN: CPT | Mod: 25 | Performed by: NURSE PRACTITIONER

## 2022-10-05 RX ORDER — IPRATROPIUM BROMIDE AND ALBUTEROL SULFATE 2.5; .5 MG/3ML; MG/3ML
3 SOLUTION RESPIRATORY (INHALATION) ONCE
Status: COMPLETED | OUTPATIENT
Start: 2022-10-05 | End: 2022-10-05

## 2022-10-05 RX ORDER — NYSTATIN 100000 [USP'U]/G
POWDER TOPICAL
Qty: 15 G | Refills: 0 | Status: SHIPPED | OUTPATIENT
Start: 2022-10-05

## 2022-10-05 RX ORDER — DULOXETIN HYDROCHLORIDE 60 MG/1
CAPSULE, DELAYED RELEASE ORAL
Qty: 30 CAPSULE | Refills: 0 | Status: SHIPPED | OUTPATIENT
Start: 2022-10-05

## 2022-10-05 RX ADMIN — IPRATROPIUM BROMIDE AND ALBUTEROL SULFATE 3 ML: 2.5; .5 SOLUTION RESPIRATORY (INHALATION) at 12:29

## 2022-10-05 ASSESSMENT — ENCOUNTER SYMPTOMS
SPUTUM PRODUCTION: 1
WHEEZING: 0
HEMOPTYSIS: 0
COUGH: 1
SHORTNESS OF BREATH: 1
VOMITING: 0
FEVER: 0
NAUSEA: 0

## 2022-10-05 ASSESSMENT — FIBROSIS 4 INDEX: FIB4 SCORE: 1.31

## 2022-10-05 NOTE — PROGRESS NOTES
"  Subjective:     Alex Shepard is a 67 y.o. female who presents for Cough (Pt states had covid 3 months ago, SOB, had x-ray 8 weeks ago)      Shortness of breath worsened over the last week. Productive cough today, \"yucky\" sputum. Has not needed home O2 for several years. Not currently a smoker. Had left lobe pneumonia in August.       Cough  This is a recurrent problem. The problem has been rapidly worsening. Associated symptoms include shortness of breath. Pertinent negatives include no fever, hemoptysis or wheezing.   Shortness of Breath  Associated symptoms include sputum production. Pertinent negatives include no fever, hemoptysis, vomiting or wheezing.     Past Medical History:   Diagnosis Date    Anxiety     ASTHMA     Back pain     Bipolar affect, depressed (HCC)     Bronchitis     Cholelithiasis     Chronic airway obstruction, not elsewhere classified     Cigarette smoker one half pack a day or less     Depression     H/O cervical spine surgery 2002    Hyponatremia     Pneumonia     Psychiatric disorder     depression, hallucinations- Dr. Taylor Kimble    Psychosis (HCC)     acute    PTSD (post-traumatic stress disorder)     lost sister and mother in same, had 2 neck surgeries    Pulmonary nodule     S/P appendectomy     S/P repair of patent ductus arteriosus        Past Surgical History:   Procedure Laterality Date    STAR BY LAPAROSCOPY Bilateral 2016    Procedure: STAR BY LAPAROSCOPY;  Surgeon: Hany Encinas M.D.;  Location: SURGERY [facility];  Service:     APPENDECTOMY      OTHER  ,     neck surgery    OTHER      , took out ovary or cyst    OTHER CARDIAC SURGERY      open heart in 1950- PDA       Social History     Socioeconomic History    Marital status:      Spouse name: Not on file    Number of children: Not on file    Years of education: Not on file    Highest education level: Some college, no degree   Occupational History    " Occupation: on disability- depression/psychosis     Employer: UNEMPLOYED   Tobacco Use    Smoking status: Former     Packs/day: 0.50     Years: 15.00     Pack years: 7.50     Types: Cigarettes     Start date: 1/1/1999    Smokeless tobacco: Never    Tobacco comments:     quit 2 months ago    Vaping Use    Vaping Use: Never used   Substance and Sexual Activity    Alcohol use: Yes     Alcohol/week: 0.0 oz     Comment: occas glass of wine, Agrees to stop use    Drug use: No    Sexual activity: Never     Partners: Male   Other Topics Concern    Not on file   Social History Narrative    , 2 children.      Social Determinants of Health     Financial Resource Strain: Low Risk     Difficulty of Paying Living Expenses: Not very hard   Food Insecurity: Unknown    Worried About Running Out of Food in the Last Year: Not on file    Ran Out of Food in the Last Year: Never true   Transportation Needs: Unmet Transportation Needs    Lack of Transportation (Medical): Yes    Lack of Transportation (Non-Medical): Yes   Physical Activity: Inactive    Days of Exercise per Week: 0 days    Minutes of Exercise per Session: 0 min   Stress: No Stress Concern Present    Feeling of Stress : Not at all   Social Connections: Unknown    Frequency of Communication with Friends and Family: Three times a week    Frequency of Social Gatherings with Friends and Family: Three times a week    Attends Yazidi Services: Not on file    Active Member of Clubs or Organizations: No    Attends Club or Organization Meetings: Never    Marital Status:    Intimate Partner Violence: Not on file   Housing Stability: Low Risk     Unable to Pay for Housing in the Last Year: No    Number of Places Lived in the Last Year: 1    Unstable Housing in the Last Year: No        Family History   Problem Relation Age of Onset    Psychiatric Illness Mother         depression    Other Mother         TIA    Diabetes Mother     Psychiatric Illness Sister          "depression    Cancer Father         prostate;skin        Allergies   Allergen Reactions    Penicillins Anaphylaxis     As child- has a lot of pcn- uncertain about reaction.     Nicoderm [Nicotine] Rash     Rash where patch was placed.        Review of Systems   Constitutional:  Positive for malaise/fatigue. Negative for fever.   Respiratory:  Positive for cough, sputum production and shortness of breath. Negative for hemoptysis and wheezing.    Gastrointestinal:  Negative for nausea and vomiting.   All other systems reviewed and are negative.     Objective:   /60 (BP Location: Left arm, Patient Position: Sitting, BP Cuff Size: Adult)   Pulse 82   Temp 36.9 °C (98.5 °F) (Temporal)   Resp (!) 28   Ht 1.702 m (5' 7\")   Wt 94.2 kg (207 lb 9.6 oz)   SpO2 90% Comment: back on 3 liters of O2  BMI 32.51 kg/m²     Physical Exam  Vitals reviewed.   Constitutional:       General: She is not in acute distress.     Appearance: She is well-developed. She is ill-appearing.   HENT:      Head: Normocephalic and atraumatic.      Right Ear: External ear normal.      Left Ear: External ear normal.   Eyes:      Conjunctiva/sclera: Conjunctivae normal.   Cardiovascular:      Rate and Rhythm: Normal rate.   Pulmonary:      Effort: Tachypnea present. No accessory muscle usage or retractions.      Breath sounds: No stridor. Examination of the right-lower field reveals decreased breath sounds. Decreased breath sounds present. No wheezing.      Comments: Discussed concerns with right side PNA on auscultation, patient reports having mid right back pain.   Skin:     General: Skin is warm and dry.      Findings: No rash.   Neurological:      Mental Status: She is alert and oriented to person, place, and time.      GCS: GCS eye subscore is 4. GCS verbal subscore is 5. GCS motor subscore is 6.   Psychiatric:         Speech: Speech normal.         Behavior: Behavior normal.         Thought Content: Thought content normal.         " Judgment: Judgment normal.       Assessment/Plan:   1. Pneumonia of right middle lobe due to infectious organism  - DX-CHEST-2 VIEWS; Future    2. Shortness of breath  - DX-CHEST-2 VIEWS; Future  - ipratropium-albuterol (DUONEB) nebulizer solution    3. Hypoxia    DX-CHEST-2 VIEWS    Result Date: 10/5/2022  10/5/2022 11:54 AM HISTORY/REASON FOR EXAM:  Shortness of Breath. TECHNIQUE/EXAM DESCRIPTION AND NUMBER OF VIEWS: Two views of the chest. COMPARISON:  Radiograph 8/14/2022 FINDINGS: New airspace opacity involving the right middle lobe obscuring the right heart border, consistent with lobar pneumonia. Left upper lung consolidation has resolved in the interim. No sizable pneumothorax or pleural effusion. Cardiomediastinal silhouette, fortino, and pulmonary vasculature are unchanged.     Findings consistent with right middle lobe pneumonia.    79% on RA, 90-92% on 4 L. 86% post tx on RA. Stable at 3 L NC. Unable to maintain O2 sats above 90% on RA. Discussed ED f/u. Recommend EMS to maintain saturations. Patient Opted to go POV,  states he will drive her directly there. Called report to [facility], ED charge nurse..     Differential diagnosis, natural history, supportive care, and indications for immediate follow-up discussed.

## 2022-10-10 PROBLEM — J15.9 COMMUNITY ACQUIRED BACTERIAL PNEUMONIA: Status: ACTIVE | Noted: 2022-10-05

## 2022-10-10 RX ORDER — LEVOTHYROXINE SODIUM 0.07 MG/1
1 TABLET ORAL
COMMUNITY
Start: 2022-07-13 | End: 2023-02-15 | Stop reason: SDUPTHER

## 2022-10-10 RX ORDER — ARIPIPRAZOLE 10 MG/1
1 TABLET ORAL DAILY
COMMUNITY
Start: 2022-07-27

## 2022-10-10 RX ORDER — DULOXETIN HYDROCHLORIDE 60 MG/1
1 CAPSULE, DELAYED RELEASE ORAL DAILY
COMMUNITY
Start: 2022-10-05 | End: 2023-02-15 | Stop reason: SDUPTHER

## 2022-10-28 ENCOUNTER — TELEPHONE (OUTPATIENT)
Dept: HEALTH INFORMATION MANAGEMENT | Facility: OTHER | Age: 67
End: 2022-10-28
Payer: MEDICARE

## 2022-11-01 ENCOUNTER — TELEPHONE (OUTPATIENT)
Dept: MEDICAL GROUP | Age: 67
End: 2022-11-01
Payer: MEDICARE

## 2022-11-01 NOTE — TELEPHONE ENCOUNTER
VOICEMAIL  1. Caller Name: Kyann Hickerson                        Call Back Number: [phone] (home)       2. Message: Patient and rehab center called asking for doctors notes for possible O2 order. Patient states rehab center should have things taken care of but they need a follow up. Scheduled for 11/15/22 @ 1:20 pm.    3. Patient approves office to leave a detailed voicemail/MyChart message: yes

## 2022-11-09 ENCOUNTER — PATIENT MESSAGE (OUTPATIENT)
Dept: HEALTH INFORMATION MANAGEMENT | Facility: OTHER | Age: 67
End: 2022-11-09

## 2022-11-15 ENCOUNTER — APPOINTMENT (OUTPATIENT)
Dept: MEDICAL GROUP | Age: 67
End: 2022-11-15
Payer: MEDICARE

## 2022-12-01 ENCOUNTER — PATIENT OUTREACH (OUTPATIENT)
Dept: HEALTH INFORMATION MANAGEMENT | Facility: OTHER | Age: 67
End: 2022-12-01
Payer: MEDICARE

## 2022-12-01 NOTE — PROGRESS NOTES
[facility] called pt to offer CCM services. Pt states she is in rehab right now and will follow up within a couple of weeks.

## 2022-12-06 ENCOUNTER — TELEPHONE (OUTPATIENT)
Dept: MEDICAL GROUP | Age: 67
End: 2022-12-06
Payer: MEDICARE

## 2023-02-15 ENCOUNTER — OFFICE VISIT (OUTPATIENT)
Dept: MEDICAL GROUP | Age: 68
End: 2023-02-15
Payer: MEDICARE

## 2023-02-15 VITALS
OXYGEN SATURATION: 87 % | WEIGHT: 206 LBS | HEIGHT: 67 IN | BODY MASS INDEX: 32.33 KG/M2 | TEMPERATURE: 98.3 F | DIASTOLIC BLOOD PRESSURE: 60 MMHG | RESPIRATION RATE: 16 BRPM | HEART RATE: 66 BPM | SYSTOLIC BLOOD PRESSURE: 114 MMHG

## 2023-02-15 DIAGNOSIS — J42 CHRONIC BRONCHITIS, UNSPECIFIED CHRONIC BRONCHITIS TYPE (HCC): ICD-10-CM

## 2023-02-15 DIAGNOSIS — J96.10 CHRONIC RESPIRATORY FAILURE, UNSPECIFIED WHETHER WITH HYPOXIA OR HYPERCAPNIA (HCC): ICD-10-CM

## 2023-02-15 DIAGNOSIS — F31.9 BIPOLAR 1 DISORDER (HCC): ICD-10-CM

## 2023-02-15 DIAGNOSIS — G25.2 INTENTION TREMOR: ICD-10-CM

## 2023-02-15 DIAGNOSIS — E03.4 HYPOTHYROIDISM DUE TO ACQUIRED ATROPHY OF THYROID: ICD-10-CM

## 2023-02-15 PROBLEM — E03.9 HYPOTHYROIDISM: Status: ACTIVE | Noted: 2022-10-10

## 2023-02-15 PROBLEM — I50.9 CHF EXACERBATION (HCC): Status: ACTIVE | Noted: 2022-11-09

## 2023-02-15 PROCEDURE — 99214 OFFICE O/P EST MOD 30 MIN: CPT | Performed by: FAMILY MEDICINE

## 2023-02-15 RX ORDER — DULOXETIN HYDROCHLORIDE 60 MG/1
60 CAPSULE, DELAYED RELEASE ORAL DAILY
Qty: 90 CAPSULE | Refills: 2 | Status: SHIPPED | OUTPATIENT
Start: 2023-02-15

## 2023-02-15 RX ORDER — FUROSEMIDE 20 MG/1
20 TABLET ORAL 2 TIMES DAILY
COMMUNITY

## 2023-02-15 RX ORDER — LEVOTHYROXINE SODIUM 0.07 MG/1
TABLET ORAL
Qty: 90 TABLET | Refills: 2 | Status: SHIPPED | OUTPATIENT
Start: 2023-02-15

## 2023-02-15 RX ORDER — POTASSIUM CHLORIDE 750 MG/1
10 TABLET, FILM COATED, EXTENDED RELEASE ORAL DAILY
COMMUNITY
Start: 2022-12-20

## 2023-02-15 RX ORDER — PROPRANOLOL HCL 60 MG
60 CAPSULE, EXTENDED RELEASE 24HR ORAL DAILY
Qty: 90 CAPSULE | Refills: 1 | Status: SHIPPED | OUTPATIENT
Start: 2023-02-15

## 2023-02-15 RX ORDER — LOSARTAN POTASSIUM 50 MG/1
25 TABLET ORAL DAILY
COMMUNITY
Start: 2022-11-29

## 2023-02-15 RX ORDER — ACETAZOLAMIDE 250 MG/1
250-500 TABLET ORAL 2 TIMES DAILY
COMMUNITY

## 2023-02-15 RX ORDER — ARIPIPRAZOLE 10 MG/1
10 TABLET ORAL DAILY
Qty: 90 TABLET | Refills: 2 | Status: SHIPPED | OUTPATIENT
Start: 2023-02-15

## 2023-02-15 RX ORDER — LEVOTHYROXINE SODIUM 0.07 MG/1
75 TABLET ORAL
Qty: 90 TABLET | Refills: 2 | Status: SHIPPED | OUTPATIENT
Start: 2023-02-15

## 2023-02-15 ASSESSMENT — FIBROSIS 4 INDEX: FIB4 SCORE: 1

## 2023-02-15 NOTE — PROGRESS NOTES
This medical record contains text that has been entered with the assistance of computer voice recognition and dictation software.  Therefore, it may contain unintended errors in text, spelling, punctuation, or grammar      Chief Complaint   Patient presents with    Oxygen Dependency     Needs to get approved for oxygen / med refills          lAex Shepard is a 67 y.o. female here evaluation and management of: respiratory failure      HPI:           1. Chronic respiratory failure, unspecified whether with hypoxia or hypercapnia (HCC)  2. Chronic bronchitis, unspecified chronic bronchitis type (HCC)      Alex originally presented to me on August 11, 2022 with a chief complaint of cough productive of greenish sputum mild shortness of breath and fatigue.  Her  had tested positive for COVID at home she tested negative but she felt the symptoms above including no energy so she came in.  She had been vaccinated for COVID-19 with Moderna x2.  She was given azithromycin, as well as a steroid taper and vitamin C.  We also obtained a plain film which revealed possible multilobar pneumonia.  She was also seen in the emergency room she was discharged with oxygen.  She has not been able to wean off of the oxygen.  She likely will need this for life given her history of COPD and respiratory failure.  Without her oxygen she has a hard time walking around the house without getting shortness of breath with the oxygen there is no issue.     CT CTA chest on November 9, 2022     CT-CTA CHEST WITH & W/O-POST PROCESS  Order: [id number]  Impression      1. No evidence of a pulmonary embolism or acute thoracic aortic abnormality.   2. Multifocal pulmonary consolidation, suspicious for pneumonia and worse from   October 2022 comparison. Recommend follow-up to ensure resolution, possibly in 2   to 3 months.   3. Small simple appearing left hand small loculated right pleural effusions.   4. Mild cardiomegaly.     Electronically Signed  by: Henrique Lindsey MD 11/9/2022 7:31 PM  Narrative    EXAM: CT angiogram of the chest.     TECHNIQUE: Thin section axial imaging of the thorax was performed after the   intravenous administration of 63 mL of Isovue 370. Multiplanar 3D reconstruction   and MIP imaging was provided. An adaptive iterative reconstruction technique was   utilized for dose reduction.    Current medicines (including changes today)  Current Outpatient Medications   Medication Sig Dispense Refill    acetaZOLAMIDE (DIAMOX) 250 MG Tab Take 250-500 mg by mouth 2 times a day.      furosemide (LASIX) 20 MG Tab Take 20 mg by mouth 2 times a day.      propranolol LA (INDERAL LA) 60 MG CAPSULE SR 24 HR Take 1 Capsule by mouth every day. 90 Capsule 1    levothyroxine (SYNTHROID) 75 MCG Tab Take 1 Tablet by mouth every morning on an empty stomach. 90 Tablet 2    levothyroxine (EUTHYROX) 75 MCG Tab TAKE 1 TABLET BY MOUTH ONCE DAILY IN THE MORNING ON AN EMPTY STOMACH (NEED  OFFICE  VISIT  AND  LABS) 90 Tablet 2    DULoxetine (CYMBALTA) 60 MG Cap DR Particles delayed-release capsule Take 1 Capsule by mouth every day. 90 Capsule 2    NON SPECIFIED Please provide patient with home oxygen portable tank and all accessories 2 Each 2    ARIPiprazole (ABILIFY) 10 MG Tab Take 1 Tablet by mouth every day. 90 Tablet 2    ARIPiprazole (ABILIFY) 10 MG Tab Take 1 Tablet by mouth every day.      DULoxetine (CYMBALTA) 60 MG Cap DR Particles delayed-release capsule Take 1 capsule by mouth once daily 30 Capsule 0    nystatin (NYSTOP) powder APPLY 1 GRAM TOPICALLY TO AFFECTED AREA 4 TIMES DAILY 15 g 0    albuterol (PROAIR HFA) 108 (90 Base) MCG/ACT Aero Soln inhalation aerosol Inhale 2 Puffs every 6 hours as needed for Shortness of Breath. 8.5 g 3    triamcinolone acetonide (KENALOG) 0.1 % Ointment Aaa bid x 14 days then stop 1 Each 1    fluticasone (FLONASE) 50 MCG/ACT nasal spray Administer 1 Spray into affected nostril(S) every day. 16 g 4    benzonatate (TESSALON) 100  MG Cap Take 1 Capsule by mouth 3 times a day as needed for Cough. (Patient not taking: Reported on 2/15/2023) 60 Capsule 0    albuterol (PROVENTIL) 2.5mg/0.5ml Nebu Soln 0.5 mL by Nebulization route every four hours as needed for Shortness of Breath. (Patient not taking: Reported on 10/5/2022) 2 Bottle 3     No current facility-administered medications for this visit.     She  has a past medical history of Anxiety, ASTHMA, Back pain, Bipolar affect, depressed ([facility]), Bronchitis, Cholelithiasis, Chronic airway obstruction, not elsewhere classified, Cigarette smoker one half pack a day or less, Depression, H/O cervical spine surgery (2002), Hyponatremia, Pneumonia, Psychiatric disorder (), Psychosis (), PTSD (post-traumatic stress disorder), Pulmonary nodule, S/P appendectomy (), and S/P repair of patent ductus arteriosus ().  She  has a past surgical history that includes other cardiac surgery; other (, ); other; appendectomy; and cristofer by laparoscopy (Bilateral, 2016).  Social History     Tobacco Use    Smoking status: Former     Packs/day: 0.50     Years: 15.00     Pack years: 7.50     Types: Cigarettes     Start date: 1999    Smokeless tobacco: Never    Tobacco comments:     quit 2 months ago    Vaping Use    Vaping Use: Never used   Substance Use Topics    Alcohol use: Yes     Alcohol/week: 0.0 oz     Comment: occas glass of wine, Agrees to stop use    Drug use: No     Social History     Social History Narrative    , 2 children.      Family History   Problem Relation Age of Onset    Psychiatric Illness Mother         depression    Other Mother         TIA    Diabetes Mother     Psychiatric Illness Sister         depression    Cancer Father         prostate;skin     Family Status   Relation Name Status    Mo   at age 91    Sis      Fa   at age 86         ROS    The pertinent  ROS findings can be seen in the HPI above.     All other systems  "reviewed and are negative     Objective:     /60 (BP Location: Right arm, Patient Position: Sitting, BP Cuff Size: Adult)   Pulse 66   Temp 36.8 °C (98.3 °F) (Temporal)   Resp 16   Ht 1.702 m (5' 7\")   Wt 93.4 kg (206 lb)   SpO2 (!) 87%  Body mass index is 32.26 kg/m².      Physical Exam:    Constitutional: Alert, no distress.  Skin: No suspicious lesions  Eye: Equal, round and reactive, conjunctiva clear, lids normal.  ENMT: Lips without lesions, good dentition, oropharynx clear.  Neck: Trachea midline, no masses, no thyromegaly. No cervical or supraclavicular lymphadenopathy.  Respiratory: Unlabored respiratory effort, lungs clear to auscultation, no wheezes, no ronchi.  Cardiovascular: Normal S1, S2, no murmur, no edema  Abdomen: Soft, non-tender, no masses, no hepatosplenomegaly.    Her oxygen on room air was 87%    Assessment and Plan:   The following treatment plan was discussed    All recent labs and provider notes reviewed    1. Chronic respiratory failure, unspecified whether with hypoxia or hypercapnia (HCC)    2. Chronic bronchitis, unspecified chronic bronchitis type (HCC)  She does qualify for continuous home O2.  We will call her associated [employer] and fill out all orders        - propranolol LA (INDERAL LA) 60 MG CAPSULE SR 24 HR; Take 1 Capsule by mouth every day.  Dispense: 90 Capsule; Refill: 1      - levothyroxine (EUTHYROX) 75 MCG Tab; TAKE 1 TABLET BY MOUTH ONCE DAILY IN THE MORNING ON AN EMPTY STOMACH (NEED  OFFICE  VISIT  AND  LABS)  Dispense: 90 Tablet; Refill: 2    - ARIPiprazole (ABILIFY) 10 MG Tab; Take 1 Tablet by mouth every day.  Dispense: 90 Tablet; Refill: 2    Other orders  - acetaZOLAMIDE (DIAMOX) 250 MG Tab; Take 250-500 mg by mouth 2 times a day.  - furosemide (LASIX) 20 MG Tab; Take 20 mg by mouth 2 times a day.  - levothyroxine (SYNTHROID) 75 MCG Tab; Take 1 Tablet by mouth every morning on an empty stomach.  Dispense: 90 Tablet; Refill: 2  - DULoxetine " (CYMBALTA) 60 MG Cap DR Particles delayed-release capsule; Take 1 Capsule by mouth every day.  Dispense: 90 Capsule; Refill: 2  - NON SPECIFIED; Please provide patient with home oxygen portable tank and all accessories  Dispense: 2 Each; Refill: 2             Instructed to Follow up in clinic or ER for worsening symptoms, difficulty breathing, lack of expected recovery, or should new symptoms or problems arise.    Followup: Return in about 6 months (around 8/15/2023) for Reevaluation.

## 2024-12-10 NOTE — TELEPHONE ENCOUNTER
ESTABLISHED PATIENT PRE-VISIT PLANNING     Note: Patient will not be contacted if there is no indication to call.     1.  Reviewed notes from the last few office visits within the medical group: Yes    2.  If any orders were placed at last visit or intended to be done for this visit (i.e. 6 mos follow-up), do we have Results/Consult Notes?        •  Labs - Labs ordered, completed on 12/15/17 and results are in chart.   Note: If patient appointment is for lab review and patient did not complete labs, check with provider if OK to reschedule patient until labs completed.       •  Imaging - Imaging was not ordered at last office visit.       •  Referrals - Referral ordered, patient was seen and consult notes are in chart. Care Teams updated  YES.    3. Is this appointment scheduled as a Hospital Follow-Up? No    4.  Immunizations were updated in Epic using WebIZ?: Epic matches WebIZ       •  Web Iz Recommendations: TDAP    5.  Patient is due for the following Health Maintenance Topics:   Health Maintenance Due   Topic Date Due   • IMM DTaP/Tdap/Td Vaccine (1 - Tdap) 05/21/1974   • PFT SCREENING-FEV1 AND FEV/FVC RATIO / SPIROMETRY SHOULD BE PERFORMED ANNUALLY  12/12/2015       - Patient is up-to-date on all Health Maintenance topics. No records have been requested at this time.    6.  Patient was NOT informed to arrive 15 min prior to their scheduled appointment and bring in their medication bottles.   Reason for call:  TC from Nicole w/[facility]. Pt id verified by two identifiers. Nicole states [facility] faxed a CMN form, for pt's incontinence supplies, to PCP on 12/05/24. Nicole stated form is being faxed, to PCP, due to correction needing to be made on form. Nicole stated she will refax form to PCP, today. Nicole stated once form has been corrected and signed by PCP, please fax to [facility] at fax number: [phone].      Best call back number: Nicole/[facility]/Phone Number: [phone]/Fax Number: [phone]